# Patient Record
Sex: FEMALE | Race: WHITE | Employment: OTHER | URBAN - METROPOLITAN AREA
[De-identification: names, ages, dates, MRNs, and addresses within clinical notes are randomized per-mention and may not be internally consistent; named-entity substitution may affect disease eponyms.]

---

## 2017-01-18 ENCOUNTER — NURSING HOME VISIT (OUTPATIENT)
Dept: FAMILY MEDICINE | Facility: OTHER | Age: 82
End: 2017-01-18
Payer: COMMERCIAL

## 2017-01-18 DIAGNOSIS — I10 BENIGN ESSENTIAL HYPERTENSION: Primary | ICD-10-CM

## 2017-01-18 DIAGNOSIS — E03.9 ACQUIRED HYPOTHYROIDISM: ICD-10-CM

## 2017-01-18 PROCEDURE — 99308 SBSQ NF CARE LOW MDM 20: CPT | Performed by: FAMILY MEDICINE

## 2017-01-18 NOTE — MR AVS SNAPSHOT
"              After Visit Summary   2017    Estela Barber    MRN: [id number]           Patient Information     Date Of Birth          10/17/1925        Visit Information        Provider Department      2017 11:27 AM Pao Chau MD [facility]        Today's Diagnoses     Benign essential hypertension    -  1     Acquired hypothyroidism            Follow-ups after your visit        Follow-up notes from your care team     Return in about 4 weeks (around 2/15/2017).      Who to contact     If you have questions or need follow up information about today's clinic visit or your schedule please contact [facility] directly at [phone].  Normal or non-critical lab and imaging results will be communicated to you by MyChart, letter or phone within 4 business days after the clinic has received the results. If you do not hear from us within 7 days, please contact the clinic through MyChart or phone. If you have a critical or abnormal lab result, we will notify you by phone as soon as possible.  Submit refill requests through GreenIQ or call your pharmacy and they will forward the refill request to us. Please allow 3 business days for your refill to be completed.          Additional Information About Your Visit        MyChart Information     GreenIQ lets you send messages to your doctor, view your test results, renew your prescriptions, schedule appointments and more. To sign up, go to [email or web address] . Click on \"Log in\" on the left side of the screen, which will take you to the Welcome page. Then click on \"Sign up Now\" on the right side of the page.     You will be asked to enter the access code listed below, as well as some personal information. Please follow the directions to create your username and password.     Your access code is: [email or web address]  Expires: 2017 11:11 AM     Your access code will  in 90 days. If you need help or a new code, please call your " [facility] or [phone].        Care EveryWhere ID     This is your Care EveryWhere ID. This could be used by other organizations to access your [place] medical records  [id number]         Blood Pressure from Last 3 Encounters:   12/09/16 117/60   12/13/16 98/68   11/10/16 118/67    Weight from Last 3 Encounters:   12/09/16 122 lb 14.4 oz (55.747 kg)   11/10/16 121 lb (54.885 kg)   10/13/16 124 lb 9 oz (56.5 kg)              Today, you had the following     No orders found for display         Today's Medication Changes          These changes are accurate as of: 1/18/17 11:11 AM.  If you have any questions, ask your nurse or doctor.               These medicines have changed or have updated prescriptions.        Dose/Directions    polyethylene glycol Packet   Commonly known as:  MIRALAX/GLYCOLAX   This may have changed:    - when to take this  - additional instructions   Used for:  Fracture, intertrochanteric, left femur, closed, initial encounter (H)        Dose:  17 g   Take 17 g by mouth daily as needed for constipation Daily for the next 2 days, the daily prn if less than daily bowel movement   Quantity:  10 packet   Refills:  0                Primary Care Provider Office Phone # Fax #    R Nirmal Wade [phone]       [place] [address]        Thank you!     Thank you for choosing [facility]  for your care. Our goal is always to provide you with excellent care. Hearing back from our patients is one way we can continue to improve our services. Please take a few minutes to complete the written survey that you may receive in the mail after your visit with us. Thank you!             Your Updated Medication List - Protect others around you: Learn how to safely use, store and throw away your medicines at www.disposemymeds.org.          This list is accurate as of: 1/18/17 11:11 AM.  Always use your most recent med list.                    Brand Name Dispense Instructions for use    acetaminophen 325 MG tablet    TYLENOL    100 tablet    Take 2 tablets (650 mg) by mouth every 6 hours as needed for mild pain       alendronate 70 MG tablet    FOSAMAX    12 tablet    TAKE 1 TABLET BY MOUTH EVERY WEEK IN THE MORNING, AT LEAST 30 MINUTESBEFORE FIRST FOOD, BEVERAGE OR MEDICATION OF THE DAY       ARTIFICIAL TEARS OP      Apply to eye 4 times daily       ASPIRIN ADULT LOW STRENGTH 81 MG EC tablet   Generic drug:  aspirin     90 tablet    TAKE 1 TABLET BY MOUTH DAILY       bisacodyl 10 MG Suppository    DULCOLAX    30 suppository    Place 1 suppository (10 mg) rectally 2 times daily as needed for constipation       calcium-vitamin D-vitamin K 500-500-40 MG-UNT-MCG Chew    VIACTIV    60 tablet    CHEW AND SWALLOW 1 TABLET BY MOUTH 2 TIMES A DAY       CVS FISH OIL 1000 MG Caps     30 capsule    Take 1 capsule by mouth daily       ipratropium 0.03 % spray    ATROVENT    30 mL    SPRAYS 2 SPRAYS INTO BOTH NOSTRILS EVERY 12 HOURS       levothyroxine 75 MCG tablet    SYNTHROID/LEVOTHROID    30 tablet    TAKE 1 TABLET BY MOUTH DAILY       magnesium hydroxide 400 MG/5ML suspension    MILK OF MAGNESIA     Take by mouth every other day       metoprolol 100 MG 24 hr tablet    TOPROL-XL    30 tablet    TAKE ONE TABLET BY MOUTH EVERY DAY       * oxyCODONE HCl 5 MG Taba    OXECTA     Take 5 mg by mouth 2 times daily       * oxyCODONE 5 MG IR tablet    ROXICODONE    120 tablet    Take 0.5-1 tablets (2.5-5 mg) by mouth every 4 hours as needed for moderate to severe pain       polyethylene glycol Packet    MIRALAX/GLYCOLAX    10 packet    Take 17 g by mouth daily as needed for constipation Daily for the next 2 days, the daily prn if less than daily bowel movement       sennosides 8.6 MG tablet    SENOKOT     Take 1 tablet by mouth 2 times daily       UNABLE TO FIND      Colostomy wafer change 2 times weekly on Tuesday and Friday       ZETIA 10 MG tablet   Generic drug:   ezetimibe     30 tablet    TAKE 1 TABLET BY MOUTH DAILY       * Notice:  This list has 2 medication(s) that are the same as other medications prescribed for you. Read the directions carefully, and ask your doctor or other care provider to review them with you.

## 2017-01-18 NOTE — PROGRESS NOTES
HISTORY OF PRESENT ILLNESS:  Estela is a 89 year old female (10/17/1925)  resident of [facility] who is being seen today for a routine 30 day follow up. She was originally admitted to rehab unit following femur fracture, but has transitioned to extended care. Patient offers no other complaint.  Staff notes no other issues.    Current medications, allergies, and interdisciplinary care plan are reviewed.      Patient Active Problem List    Diagnosis Date Noted     Femur fracture, left (H) 10/13/2016     Priority: Medium     Facial skin lesion 10/28/2013     Priority: Medium     Advanced care planning/counseling discussion 07/07/2010     Priority: Medium     Advanced directives, counseling/discussion 05/01/2015     CHF (congestive heart failure) (H) 10/28/2013     Coronary atherosclerosis 01/01/2011     Problem list name updated by automated process. Provider to review       Kyphosis 01/01/2011     Osteoarthritis 01/01/2011     Problem list name updated by automated process. Provider to review       Benign essential hypertension 08/14/2006     Hypothyroidism 12/11/2000          Social History     Social History     Marital Status:      Spouse Name: N/A     Number of Children: N/A     Years of Education: N/A     Occupational History      Homemaker     retired     Social History Main Topics     Smoking status: Former Smoker     Smokeless tobacco: Never Used      Comment: tried to quit yes, no passive exposure     Alcohol Use: No     Drug Use: No     Sexual Activity: Not on file     Other Topics Concern     Caffeine Concern Yes     one cup daily     Parent/Sibling W/ Cabg, Mi Or Angioplasty Before 65f 55m? No     Social History Narrative    Lives at [facility]        Current Outpatient Prescriptions   Medication Sig     magnesium hydroxide (MILK OF MAGNESIA) 400 MG/5ML suspension Take by mouth every other day     oxyCODONE HCl (OXECTA) 5 MG TABA Take 5 mg by mouth 2 times daily     sennosides  (SENOKOT) 8.6 MG tablet Take 1 tablet by mouth 2 times daily     oxyCODONE (ROXICODONE) 5 MG immediate release tablet Take 0.5-1 tablets (2.5-5 mg) by mouth every 4 hours as needed for moderate to severe pain     acetaminophen (TYLENOL) 325 MG tablet Take 2 tablets (650 mg) by mouth every 6 hours as needed for mild pain     bisacodyl (DULCOLAX) 10 MG suppository Place 1 suppository (10 mg) rectally 2 times daily as needed for constipation     polyethylene glycol (MIRALAX/GLYCOLAX) packet Take 17 g by mouth daily as needed for constipation Daily for the next 2 days, the daily prn if less than daily bowel movement (Patient taking differently: Take 17 g by mouth daily Daily for the next 2 days, the daily prn if less than daily bowel movement)     ZETIA 10 MG tablet TAKE 1 TABLET BY MOUTH DAILY     ipratropium (ATROVENT) 0.03 % nasal spray SPRAYS 2 SPRAYS INTO BOTH NOSTRILS EVERY 12 HOURS     ASPIRIN ADULT LOW STRENGTH 81 MG EC tablet TAKE 1 TABLET BY MOUTH DAILY     calcium-vitamin D-vitamin K (VIACTIV) 500-500-40 MG-UNT-MCG CHEW CHEW AND SWALLOW 1 TABLET BY MOUTH 2 TIMES A DAY     levothyroxine (SYNTHROID, LEVOTHROID) 75 MCG tablet TAKE 1 TABLET BY MOUTH DAILY     alendronate (FOSAMAX) 70 MG tablet TAKE 1 TABLET BY MOUTH EVERY WEEK IN THE MORNING, AT LEAST 30 MINUTESBEFORE FIRST FOOD, BEVERAGE OR MEDICATION OF THE DAY     metoprolol (TOPROL-XL) 100 MG 24 hr tablet TAKE ONE TABLET BY MOUTH EVERY DAY     Omega-3 Fatty Acids (CVS FISH OIL) 1000 MG CAPS Take 1 capsule by mouth daily     Hypromellose (ARTIFICIAL TEARS OP) Apply to eye 4 times daily     UNABLE TO FIND Colostomy wafer change 2 times weekly on Tuesday and Friday     No current facility-administered medications for this visit.     Facility-Administered Medications Ordered in Other Visits   Medication     lidocaine 1% with EPINEPHrine 1:100,000 injection       Allergies   Allergen Reactions     Atorvastatin Calcium Other (See Comments)     Increased liver  function test  Lipitor         I have reviewed the care plan and do agree with the plan.      ROS:  No chest pain, shortness of breath, fever, chills, headache, nausea, vomiting, dysuria, or changes in bowel habits.  Appetite is good.  No pain noted.          OBJECTIVE:  There were no vitals taken for this visit.    GENERAL:  Chronically ill appearing, alert, and in no acute distress  RESP:  Lungs clear.  No rales, rhonchi, or wheezing  CV:  RRR.  S1 S2 with very soft murmur  SKIN:  Age-related changes.  No suspicious lesions or rashes.  PSYCH:  Mentation confused, affect normal/bright.  EXTREM:  No edema.          Lab/Diagnostic data:    TSH due in May, BMP due around the same time      ASSESSMENT/PLAN:  1. Benign essential hypertension  No changes in current plan, lab work due in the spring.    2. Acquired hypothyroidism  As above.        Above issues stable.  No changes in current medications or care plan.      Total time spent with patient visit was 25 min including patient visit, review of pertinent clinical information, and treatment plan.      Pao Chau MD

## 2017-01-24 ENCOUNTER — TELEPHONE (OUTPATIENT)
Dept: FAMILY MEDICINE | Facility: OTHER | Age: 82
End: 2017-01-24

## 2017-01-24 DIAGNOSIS — E78.00 PURE HYPERCHOLESTEROLEMIA: Primary | ICD-10-CM

## 2017-01-24 RX ORDER — EZETIMIBE 10 MG
10 TABLET ORAL DAILY
Qty: 30 TABLET | Refills: 8 | Status: SHIPPED | OUTPATIENT
Start: 2017-01-24 | End: 2020-01-01

## 2017-01-24 NOTE — TELEPHONE ENCOUNTER
Reason for call:  Medication    1. Medication Name? Ezetimibe  2. Is this request for a refill? Yes  3. What Pharmacy do you use? [facility] , [facility] but patient is in a home in [place]. Daughter isn't sure where  4. Have you contacted your pharmacy? No    5. If yes, when?  (Please note that the turn-around-time for prescriptions is 72 business hours; I am sending your request at this time. SEND TO  [facility]  )  Description: Patients insurance won't cover Ezetimib , her daughter said it will cover Zetia.Was an appointment offered for this a call? no  Preferred method for responding to this messageTelephone Call  If we cannot reach you directly, may we leave a detailed response at the number you provided? Yes  Can this message wait until your PCP/Provider returns if not available today? n/a

## 2017-02-17 ENCOUNTER — TRANSFERRED RECORDS (OUTPATIENT)
Dept: HEALTH INFORMATION MANAGEMENT | Facility: HOSPITAL | Age: 82
End: 2017-02-17

## 2017-02-17 ENCOUNTER — NURSING HOME VISIT (OUTPATIENT)
Dept: FAMILY MEDICINE | Facility: OTHER | Age: 82
End: 2017-02-17

## 2017-02-17 VITALS
HEART RATE: 65 BPM | DIASTOLIC BLOOD PRESSURE: 58 MMHG | WEIGHT: 120 LBS | TEMPERATURE: 97.7 F | BODY MASS INDEX: 24.24 KG/M2 | OXYGEN SATURATION: 92 % | RESPIRATION RATE: 20 BRPM | SYSTOLIC BLOOD PRESSURE: 111 MMHG

## 2017-02-17 DIAGNOSIS — Z53.9 ERRONEOUS ENCOUNTER--DISREGARD: Primary | ICD-10-CM

## 2017-02-17 NOTE — MR AVS SNAPSHOT
"              After Visit Summary   2017    Estela Barber    MRN: [id number]           Patient Information     Date Of Birth          10/17/1925        Visit Information        Provider Department      2017 2:25 PM Taylor Barber NP [facility]        Today's Diagnoses     ERRONEOUS ENCOUNTER--DISREGARD    -  1       Follow-ups after your visit        Who to contact     If you have questions or need follow up information about today's clinic visit or your schedule please contact [facility] directly at [phone].  Normal or non-critical lab and imaging results will be communicated to you by AchieveIt Onlinehart, letter or phone within 4 business days after the clinic has received the results. If you do not hear from us within 7 days, please contact the clinic through [employer]ve[employer] Online[employer] or phone. If you have a critical or abnormal lab result, we will notify you by phone as soon as possible.  Submit refill requests through Music[employer] or call your pharmacy and they will forward the refill request to us. Please allow 3 business days for your refill to be completed.          Additional Information About Your Visit        MyChart Information     Musicane lets you send messages to your doctor, view your test results, renew your prescriptions, schedule appointments and more. To sign up, go to [email or web address] . Click on \"Log in\" on the left side of the screen, which will take you to the Welcome page. Then click on \"Sign up Now\" on the right side of the page.     You will be asked to enter the access code listed below, as well as some personal information. Please follow the directions to create your username and password.     Your access code is: [email or web address]  Expires: 2017 11:11 AM     Your access code will  in 90 days. If you need help or a new code, please call your [facility] or [phone].        Care EveryWhere ID     This is your Care EveryWhere ID. This could be used " by other organizations to access your [place] medical records  [id number]         Blood Pressure from Last 3 Encounters:   01/31/17 111/58   12/09/16 117/60   12/13/16 98/68    Weight from Last 3 Encounters:   01/31/17 120 lb (54.4 kg)   12/09/16 122 lb 14.4 oz (55.7 kg)   11/10/16 121 lb (54.9 kg)              Today, you had the following     No orders found for display         Today's Medication Changes          These changes are accurate as of: 2/17/17 11:59 PM.  If you have any questions, ask your nurse or doctor.               These medicines have changed or have updated prescriptions.        Dose/Directions    * oxyCODONE HCl 5 MG Taba   Commonly known as:  OXECTA   This may have changed:  Another medication with the same name was changed. Make sure you understand how and when to take each.        Dose:  5 mg   Take 5 mg by mouth 2 times daily   Refills:  0       * oxyCODONE 5 MG IR tablet   Commonly known as:  ROXICODONE   This may have changed:  how much to take   Used for:  Fracture, intertrochanteric, left femur, closed, initial encounter (H)        Dose:  2.5-5 mg   Take 0.5-1 tablets (2.5-5 mg) by mouth every 4 hours as needed for moderate to severe pain   Quantity:  120 tablet   Refills:  0       polyethylene glycol Packet   Commonly known as:  MIRALAX/GLYCOLAX   This may have changed:    - when to take this  - additional instructions   Used for:  Fracture, intertrochanteric, left femur, closed, initial encounter (H)        Dose:  17 g   Take 17 g by mouth daily as needed for constipation Daily for the next 2 days, the daily prn if less than daily bowel movement   Quantity:  10 packet   Refills:  0       * Notice:  This list has 2 medication(s) that are the same as other medications prescribed for you. Read the directions carefully, and ask your doctor or other care provider to review them with you.             Primary Care Provider Office Phone # Fax #    R Nirmal Wade [phone]  [phone]       [facility] [place] [address]  [place] [address]        Thank you!     Thank you for choosing [facility]  for your care. Our goal is always to provide you with excellent care. Hearing back from our patients is one way we can continue to improve our services. Please take a few minutes to complete the written survey that you may receive in the mail after your visit with us. Thank you!             Your Updated Medication List - Protect others around you: Learn how to safely use, store and throw away your medicines at www.disposemymeds.org.          This list is accurate as of: 2/17/17 11:59 PM.  Always use your most recent med list.                   Brand Name Dispense Instructions for use    acetaminophen 325 MG tablet    TYLENOL    100 tablet    Take 2 tablets (650 mg) by mouth every 6 hours as needed for mild pain       alendronate 70 MG tablet    FOSAMAX    12 tablet    TAKE 1 TABLET BY MOUTH EVERY WEEK IN THE MORNING, AT LEAST 30 MINUTESBEFORE FIRST FOOD, BEVERAGE OR MEDICATION OF THE DAY       ARTIFICIAL TEARS OP      Apply to eye 4 times daily       ASPIRIN ADULT LOW STRENGTH 81 MG EC tablet   Generic drug:  aspirin     90 tablet    TAKE 1 TABLET BY MOUTH DAILY       bisacodyl 10 MG Suppository    DULCOLAX    30 suppository    Place 1 suppository (10 mg) rectally 2 times daily as needed for constipation       calcium-vitamin D-vitamin K 500-500-40 MG-UNT-MCG Chew    VIACTIV    60 tablet    CHEW AND SWALLOW 1 TABLET BY MOUTH 2 TIMES A DAY       CVS FISH OIL 1000 MG Caps     30 capsule    Take 1 capsule by mouth daily       ipratropium 0.03 % spray    ATROVENT    30 mL    SPRAYS 2 SPRAYS INTO BOTH NOSTRILS EVERY 12 HOURS       levothyroxine 75 MCG tablet    SYNTHROID/LEVOTHROID    30 tablet    TAKE 1 TABLET BY MOUTH DAILY       magnesium hydroxide 400 MG/5ML suspension    MILK OF MAGNESIA     Take by mouth every other day       metoprolol 100 MG 24 hr tablet     TOPROL-XL    30 tablet    TAKE ONE TABLET BY MOUTH EVERY DAY       * oxyCODONE HCl 5 MG Taba    OXECTA     Take 5 mg by mouth 2 times daily       * oxyCODONE 5 MG IR tablet    ROXICODONE    120 tablet    Take 0.5-1 tablets (2.5-5 mg) by mouth every 4 hours as needed for moderate to severe pain       polyethylene glycol Packet    MIRALAX/GLYCOLAX    10 packet    Take 17 g by mouth daily as needed for constipation Daily for the next 2 days, the daily prn if less than daily bowel movement       sennosides 8.6 MG tablet    SENOKOT     Take 1 tablet by mouth 2 times daily       UNABLE TO FIND      Colostomy wafer change 2 times weekly on Tuesday and Friday       ZETIA 10 MG tablet   Generic drug:  ezetimibe     30 tablet    Take 1 tablet (10 mg) by mouth daily       * Notice:  This list has 2 medication(s) that are the same as other medications prescribed for you. Read the directions carefully, and ask your doctor or other care provider to review them with you.

## 2017-02-21 ENCOUNTER — TRANSFERRED RECORDS (OUTPATIENT)
Dept: HEALTH INFORMATION MANAGEMENT | Facility: HOSPITAL | Age: 82
End: 2017-02-21

## 2017-02-21 ENCOUNTER — NURSING HOME VISIT (OUTPATIENT)
Dept: FAMILY MEDICINE | Facility: OTHER | Age: 82
End: 2017-02-21
Payer: COMMERCIAL

## 2017-02-21 DIAGNOSIS — E03.8 OTHER SPECIFIED HYPOTHYROIDISM: Primary | ICD-10-CM

## 2017-02-21 DIAGNOSIS — I50.22 CHRONIC SYSTOLIC CONGESTIVE HEART FAILURE (H): ICD-10-CM

## 2017-02-21 DIAGNOSIS — I10 BENIGN ESSENTIAL HYPERTENSION: ICD-10-CM

## 2017-02-21 DIAGNOSIS — I25.10 ATHEROSCLEROSIS OF NATIVE CORONARY ARTERY OF NATIVE HEART WITHOUT ANGINA PECTORIS: ICD-10-CM

## 2017-02-21 PROCEDURE — 99308 SBSQ NF CARE LOW MDM 20: CPT | Performed by: NURSE PRACTITIONER

## 2017-02-21 NOTE — MR AVS SNAPSHOT
"              After Visit Summary   2/21/2017    Estela Barber    MRN: [id number]           Patient Information     Date Of Birth          10/17/1925        Visit Information        Provider Department      2/21/2017 4:23 PM Taylor Barber NP [facility]        Today's Diagnoses     Other specified hypothyroidism    -  1    Chronic systolic congestive heart failure (H)        Benign essential hypertension        Atherosclerosis of native coronary artery of native heart without angina pectoris           Follow-ups after your visit        Follow-up notes from your care team     Return in about 30 days (around 3/23/2017).      Who to contact     If you have questions or need follow up information about today's clinic visit or your schedule please contact [facility] directly at [phone].  Normal or non-critical lab and imaging results will be communicated to you by MyChart, letter or phone within 4 business days after the clinic has received the results. If you do not hear from us within 7 days, please contact the clinic through MyChart or phone. If you have a critical or abnormal lab result, we will notify you by phone as soon as possible.  Submit refill requests through [employer] or call your pharmacy and they will forward the refill request to us. Please allow 3 business days for your refill to be completed.          Additional Information About Your Visit        MyChart Information     [employer] lets you send messages to your doctor, view your test results, renew your prescriptions, schedule appointments and more. To sign up, go to [email or web address] . Click on \"Log in\" on the left side of the screen, which will take you to the Welcome page. Then click on \"Sign up Now\" on the right side of the page.     You will be asked to enter the access code listed below, as well as some personal information. Please follow the directions to create your username and password.     Your " access code is: [email or web address]  Expires: 2017 11:11 AM     Your access code will  in 90 days. If you need help or a new code, please call your [place] clinic or [phone].        Care EveryWhere ID     This is your Care EveryWhere ID. This could be used by other organizations to access your [place] medical records  [id number]        Your Vitals Were     Pulse Temperature Respirations Pulse Oximetry BMI (Body Mass Index)       65 97.7  F (36.5  C) 20 92% 24.24 kg/m2        Blood Pressure from Last 3 Encounters:   17 111/58   16 117/60   16 98/68    Weight from Last 3 Encounters:   17 120 lb (54.4 kg)   16 122 lb 14.4 oz (55.7 kg)   11/10/16 121 lb (54.9 kg)              Today, you had the following     No orders found for display         Today's Medication Changes          These changes are accurate as of: 17 11:59 PM.  If you have any questions, ask your nurse or doctor.               These medicines have changed or have updated prescriptions.        Dose/Directions    oxyCODONE 5 MG IR tablet   Commonly known as:  ROXICODONE   This may have changed:    - how much to take  - Another medication with the same name was removed. Continue taking this medication, and follow the directions you see here.   Used for:  Fracture, intertrochanteric, left femur, closed, initial encounter (H)        Dose:  2.5-5 mg   Take 0.5-1 tablets (2.5-5 mg) by mouth every 4 hours as needed for moderate to severe pain   Quantity:  120 tablet   Refills:  0       polyethylene glycol Packet   Commonly known as:  MIRALAX/GLYCOLAX   This may have changed:    - when to take this  - additional instructions   Used for:  Fracture, intertrochanteric, left femur, closed, initial encounter (H)        Dose:  17 g   Take 17 g by mouth daily as needed for constipation Daily for the next 2 days, the daily prn if less than daily bowel movement   Quantity:  10 packet   Refills:  0         Stop taking these  medicines if you haven't already. Please contact your care team if you have questions.     alendronate 70 MG tablet   Commonly known as:  FOSAMAX           UNABLE TO FIND                    Primary Care Provider Office Phone # Fax #    R Nirmal Wade [phone]       [facility] [place] [address] [place] [id number]        Thank you!     Thank you for choosing [facility]  for your care. Our goal is always to provide you with excellent care. Hearing back from our patients is one way we can continue to improve our services. Please take a few minutes to complete the written survey that you may receive in the mail after your visit with us. Thank you!             Your Updated Medication List - Protect others around you: Learn how to safely use, store and throw away your medicines at www.disposemymeds.org.          This list is accurate as of: 2/21/17 11:59 PM.  Always use your most recent med list.                   Brand Name Dispense Instructions for use    acetaminophen 325 MG tablet    TYLENOL    100 tablet    Take 2 tablets (650 mg) by mouth every 6 hours as needed for mild pain       ARTIFICIAL TEARS OP      Apply to eye 4 times daily       ASPIRIN ADULT LOW STRENGTH 81 MG EC tablet   Generic drug:  aspirin     90 tablet    TAKE 1 TABLET BY MOUTH DAILY       bisacodyl 10 MG Suppository    DULCOLAX    30 suppository    Place 1 suppository (10 mg) rectally 2 times daily as needed for constipation       calcium-vitamin D-vitamin K 500-500-40 MG-UNT-MCG Chew    VIACTIV    60 tablet    CHEW AND SWALLOW 1 TABLET BY MOUTH 2 TIMES A DAY       CVS FISH OIL 1000 MG Caps     30 capsule    Take 1 capsule by mouth daily       ipratropium 0.03 % spray    ATROVENT    30 mL    SPRAYS 2 SPRAYS INTO BOTH NOSTRILS EVERY 12 HOURS       levothyroxine 75 MCG tablet    SYNTHROID/LEVOTHROID    30 tablet    TAKE 1 TABLET BY MOUTH DAILY       magnesium hydroxide 400 MG/5ML suspension    MILK OF  MAGNESIA     Take by mouth every other day       metoprolol 100 MG 24 hr tablet    TOPROL-XL    30 tablet    TAKE ONE TABLET BY MOUTH EVERY DAY       oxyCODONE 5 MG IR tablet    ROXICODONE    120 tablet    Take 0.5-1 tablets (2.5-5 mg) by mouth every 4 hours as needed for moderate to severe pain       polyethylene glycol Packet    MIRALAX/GLYCOLAX    10 packet    Take 17 g by mouth daily as needed for constipation Daily for the next 2 days, the daily prn if less than daily bowel movement       sennosides 8.6 MG tablet    SENOKOT     Take 1 tablet by mouth 2 times daily       UNABLE TO FIND      Colostomy wafer change 2 times weekly on Tuesday and Friday       ZETIA 10 MG tablet   Generic drug:  ezetimibe     30 tablet    Take 1 tablet (10 mg) by mouth daily

## 2017-02-25 ENCOUNTER — TRANSFERRED RECORDS (OUTPATIENT)
Dept: HEALTH INFORMATION MANAGEMENT | Facility: HOSPITAL | Age: 82
End: 2017-02-25

## 2017-02-26 NOTE — PROGRESS NOTES
HISTORY OF PRESENT ILLNESS:  Estela is a 89 year old female (10/17/1925)  resident of [facility] who is being seen today for a routine 30 day follow up.  Patient offers no other complaint.  Staff notes no other issues.    Current medications, allergies, and interdisciplinary care plan are reviewed.      Patient Active Problem List    Diagnosis Date Noted     Femur fracture, left (H) 10/13/2016     Priority: Medium     Advanced directives, counseling/discussion 05/01/2015     Priority: Medium     Facial skin lesion 10/28/2013     Priority: Medium     CHF (congestive heart failure) (H) 10/28/2013     Priority: Medium     Coronary atherosclerosis 01/01/2011     Priority: Medium     Problem list name updated by automated process. Provider to review       Kyphosis 01/01/2011     Priority: Medium     Osteoarthritis 01/01/2011     Priority: Medium     Problem list name updated by automated process. Provider to review       Advanced care planning/counseling discussion 07/07/2010     Priority: Medium     Benign essential hypertension 08/14/2006     Priority: Medium     Hypothyroidism 12/11/2000     Priority: Medium          Social History     Social History     Marital status:      Spouse name: N/A     Number of children: N/A     Years of education: N/A     Occupational History      Homemaker     retired     Social History Main Topics     Smoking status: Former Smoker     Smokeless tobacco: Never Used      Comment: tried to quit yes, no passive exposure     Alcohol use No     Drug use: No     Sexual activity: Not on file     Other Topics Concern     Caffeine Concern Yes     one cup daily     Parent/Sibling W/ Cabg, Mi Or Angioplasty Before 65f 55m? No     Social History Narrative    Lives at [facility]        Current Outpatient Prescriptions   Medication Sig     ZETIA 10 MG tablet Take 1 tablet (10 mg) by mouth daily     magnesium hydroxide (MILK OF MAGNESIA) 400 MG/5ML suspension Take by mouth every other  day     sennosides (SENOKOT) 8.6 MG tablet Take 1 tablet by mouth 2 times daily     oxyCODONE (ROXICODONE) 5 MG immediate release tablet Take 0.5-1 tablets (2.5-5 mg) by mouth every 4 hours as needed for moderate to severe pain (Patient taking differently: Take 5 mg by mouth every 4 hours as needed for moderate to severe pain )     acetaminophen (TYLENOL) 325 MG tablet Take 2 tablets (650 mg) by mouth every 6 hours as needed for mild pain     bisacodyl (DULCOLAX) 10 MG suppository Place 1 suppository (10 mg) rectally 2 times daily as needed for constipation     polyethylene glycol (MIRALAX/GLYCOLAX) packet Take 17 g by mouth daily as needed for constipation Daily for the next 2 days, the daily prn if less than daily bowel movement (Patient taking differently: Take 17 g by mouth daily Daily for the next 2 days, the daily prn if less than daily bowel movement)     ipratropium (ATROVENT) 0.03 % nasal spray SPRAYS 2 SPRAYS INTO BOTH NOSTRILS EVERY 12 HOURS     ASPIRIN ADULT LOW STRENGTH 81 MG EC tablet TAKE 1 TABLET BY MOUTH DAILY     calcium-vitamin D-vitamin K (VIACTIV) 500-500-40 MG-UNT-MCG CHEW CHEW AND SWALLOW 1 TABLET BY MOUTH 2 TIMES A DAY     levothyroxine (SYNTHROID, LEVOTHROID) 75 MCG tablet TAKE 1 TABLET BY MOUTH DAILY     metoprolol (TOPROL-XL) 100 MG 24 hr tablet TAKE ONE TABLET BY MOUTH EVERY DAY     Omega-3 Fatty Acids (CVS FISH OIL) 1000 MG CAPS Take 1 capsule by mouth daily     Hypromellose (ARTIFICIAL TEARS OP) Apply to eye 4 times daily     UNABLE TO FIND Colostomy wafer change 2 times weekly on Tuesday and Friday     No current facility-administered medications for this visit.      Facility-Administered Medications Ordered in Other Visits   Medication     lidocaine 1% with EPINEPHrine 1:100,000 injection       Allergies   Allergen Reactions     Atorvastatin Calcium Other (See Comments)     Increased liver function test  Lipitor         I have reviewed the care plan and do agree with the  plan.      ROS:  No chest pain, shortness of breath, fever, chills, headache, nausea, vomiting, dysuria, or changes in bowel habits.  Appetite is stable.  no pain noted.          OBJECTIVE:  /58  Pulse 65  Temp 97.7  F (36.5  C)  Resp 20  Wt 120 lb (54.4 kg)  SpO2 92%  BMI 24.24 kg/m2    GENERAL:  Chronically ill appearing, alert, and in no acute distress  RESP:  Lungs clear.  No rales, rhonchi, or wheezing  CV:  RRR.  S1 S2 very soft murmur.   SKIN:  Age-related changes.  No suspicious lesions or rashes.  PSYCH:  Mentation confused, affect normal  EXTREM:  No edema.            Lab/Diagnostic data:    TSH due in May      ASSESSMENT/PLAN:  1. Other specified hypothyroidism  No changes to current plan; labs in May    2. Chronic systolic congestive heart failure (H)  As above    3. Benign essential hypertension  As above    4. Atherosclerosis of native coronary artery of native heart without angina pectoris  As above        Above issues stable.  No changes in current medications or care plan.      Total time spent with patient visit was 25 min including patient visit, review of pertinent clinical information, and treatment plan.      Taylor Barber NP

## 2017-03-13 VITALS
OXYGEN SATURATION: 92 % | TEMPERATURE: 97.7 F | DIASTOLIC BLOOD PRESSURE: 64 MMHG | SYSTOLIC BLOOD PRESSURE: 109 MMHG | WEIGHT: 119 LBS | RESPIRATION RATE: 20 BRPM | BODY MASS INDEX: 24.04 KG/M2 | HEART RATE: 65 BPM

## 2017-03-15 ENCOUNTER — NURSING HOME VISIT (OUTPATIENT)
Dept: FAMILY MEDICINE | Facility: OTHER | Age: 82
End: 2017-03-15
Payer: COMMERCIAL

## 2017-03-15 DIAGNOSIS — E03.9 HYPOTHYROIDISM, UNSPECIFIED TYPE: ICD-10-CM

## 2017-03-15 DIAGNOSIS — I10 BENIGN ESSENTIAL HYPERTENSION: ICD-10-CM

## 2017-03-15 DIAGNOSIS — I50.22 CHRONIC SYSTOLIC CONGESTIVE HEART FAILURE (H): Primary | ICD-10-CM

## 2017-03-15 PROCEDURE — 99308 SBSQ NF CARE LOW MDM 20: CPT | Performed by: FAMILY MEDICINE

## 2017-03-15 NOTE — MR AVS SNAPSHOT
"              After Visit Summary   3/15/2017    Estela Barber    MRN: [id number]           Patient Information     Date Of Birth          10/17/1925        Visit Information        Provider Department      3/15/2017 11:59 AM Pao Chau MD [facility]        Today's Diagnoses     Chronic systolic congestive heart failure (H)    -  1    Benign essential hypertension        Hypothyroidism, unspecified type           Follow-ups after your visit        Follow-up notes from your care team     Return in about 4 weeks (around 2017).      Who to contact     If you have questions or need follow up information about today's clinic visit or your schedule please contact [facility] directly at [phone].  Normal or non-critical lab and imaging results will be communicated to you by MyChart, letter or phone within 4 business days after the clinic has received the results. If you do not hear from us within 7 days, please contact the clinic through MyChart or phone. If you have a critical or abnormal lab result, we will notify you by phone as soon as possible.  Submit refill requests through [employer] or call your pharmacy and they will forward the refill request to us. Please allow 3 business days for your refill to be completed.          Additional Information About Your Visit        MyChart Information     [employer] lets you send messages to your doctor, view your test results, renew your prescriptions, schedule appointments and more. To sign up, go to [email or web address] . Click on \"Log in\" on the left side of the screen, which will take you to the Welcome page. Then click on \"Sign up Now\" on the right side of the page.     You will be asked to enter the access code listed below, as well as some personal information. Please follow the directions to create your username and password.     Your access code is: [email or web address]  Expires: 2017 12:11 PM     Your access code will  " in 90 days. If you need help or a new code, please call your [facility] or [phone].        Care EveryWhere ID     This is your Care EveryWhere ID. This could be used by other organizations to access your [place] medical records  [id number]        Your Vitals Were     Pulse Temperature Respirations Pulse Oximetry BMI (Body Mass Index)       65 97.7  F (36.5  C) 20 92% 24.04 kg/m2        Blood Pressure from Last 3 Encounters:   03/13/17 109/64   01/31/17 111/58   12/09/16 117/60    Weight from Last 3 Encounters:   03/13/17 119 lb (54 kg)   01/31/17 120 lb (54.4 kg)   12/09/16 122 lb 14.4 oz (55.7 kg)              Today, you had the following     No orders found for display         Today's Medication Changes          These changes are accurate as of: 3/15/17 11:59 PM.  If you have any questions, ask your nurse or doctor.               These medicines have changed or have updated prescriptions.        Dose/Directions    oxyCODONE 5 MG IR tablet   Commonly known as:  ROXICODONE   This may have changed:  how much to take   Used for:  Fracture, intertrochanteric, left femur, closed, initial encounter (H)        Dose:  2.5-5 mg   Take 0.5-1 tablets (2.5-5 mg) by mouth every 4 hours as needed for moderate to severe pain   Quantity:  120 tablet   Refills:  0       polyethylene glycol Packet   Commonly known as:  MIRALAX/GLYCOLAX   This may have changed:    - when to take this  - additional instructions   Used for:  Fracture, intertrochanteric, left femur, closed, initial encounter (H)        Dose:  17 g   Take 17 g by mouth daily as needed for constipation Daily for the next 2 days, the daily prn if less than daily bowel movement   Quantity:  10 packet   Refills:  0                Primary Care Provider Office Phone # Fax #    R Nirmal Wade [phone]       [address] [address]        Thank you!     Thank you for [place] [facility]  [place]. Our goal is always to provide you with excellent care. Hearing back from our patients is one way we can continue to improve our services. Please take a few minutes to complete the written survey that you may receive in the mail after your visit with us. Thank you!             Your Updated Medication List - Protect others around you: Learn how to safely use, store and throw away your medicines at www.disposemymeds.org.          This list is accurate as of: 3/15/17 11:59 PM.  Always use your most recent med list.                   Brand Name Dispense Instructions for use    acetaminophen 325 MG tablet    TYLENOL    100 tablet    Take 2 tablets (650 mg) by mouth every 6 hours as needed for mild pain       ARTIFICIAL TEARS OP      Apply to eye 4 times daily       ASPIRIN ADULT LOW STRENGTH 81 MG EC tablet   Generic drug:  aspirin     90 tablet    TAKE 1 TABLET BY MOUTH DAILY       bisacodyl 10 MG Suppository    DULCOLAX    30 suppository    Place 1 suppository (10 mg) rectally 2 times daily as needed for constipation       ipratropium 0.03 % spray    ATROVENT    30 mL    SPRAYS 2 SPRAYS INTO BOTH NOSTRILS EVERY 12 HOURS       levothyroxine 75 MCG tablet    SYNTHROID/LEVOTHROID    30 tablet    TAKE 1 TABLET BY MOUTH DAILY       magnesium hydroxide 400 MG/5ML suspension    MILK OF MAGNESIA     Take by mouth every other day       metoprolol 100 MG 24 hr tablet    TOPROL-XL    30 tablet    TAKE ONE TABLET BY MOUTH EVERY DAY       oxyCODONE 5 MG IR tablet    ROXICODONE    120 tablet    Take 0.5-1 tablets (2.5-5 mg) by mouth every 4 hours as needed for moderate to severe pain       polyethylene glycol Packet    MIRALAX/GLYCOLAX    10 packet    Take 17 g by mouth daily as needed for constipation Daily for the next 2 days, the daily prn if less than daily bowel movement       sennosides 8.6 MG tablet    SENOKOT     Take 1 tablet by mouth 2 times daily       UNABLE TO FIND      Colostomy wafer change 2 times weekly on  Tuesday and Friday       ZETIA 10 MG tablet   Generic drug:  ezetimibe     30 tablet    Take 1 tablet (10 mg) by mouth daily

## 2017-03-16 NOTE — PROGRESS NOTES
HISTORY OF PRESENT ILLNESS:  Estela is a 89 year old female (10/17/1925)  resident of [place] who is being seen today for a routine 30 day follow up. She has overall been feeling well, she is currently eating breakfast. Patient offers no other complaint.  Staff notes no other issues.    Current medications, allergies, and interdisciplinary care plan are reviewed.      Patient Active Problem List    Diagnosis Date Noted     Femur fracture, left (H) 10/13/2016     Priority: Medium     Facial skin lesion 10/28/2013     Priority: Medium     Advanced care planning/counseling discussion 07/07/2010     Priority: Medium     Advanced directives, counseling/discussion 05/01/2015     CHF (congestive heart failure) (H) 10/28/2013     Coronary atherosclerosis 01/01/2011     Problem list name updated by automated process. Provider to review       Kyphosis 01/01/2011     Osteoarthritis 01/01/2011     Problem list name updated by automated process. Provider to review       Benign essential hypertension 08/14/2006     Hypothyroidism 12/11/2000          Social History     Social History     Marital status:      Spouse name: N/A     Number of children: N/A     Years of education: N/A     Occupational History      Homemaker     retired     Social History Main Topics     Smoking status: Former Smoker     Smokeless tobacco: Never Used      Comment: tried to quit yes, no passive exposure     Alcohol use No     Drug use: No     Sexual activity: Not on file     Other Topics Concern     Caffeine Concern Yes     one cup daily     Parent/Sibling W/ Cabg, Mi Or Angioplasty Before 65f 55m? No     Social History Narrative    L[facility] at [facility]        Current Outpatient Prescriptions   Medication Sig     ZETIA 10 MG tablet Take 1 tablet (10 mg) by mouth daily     magnesium hydroxide (MILK OF MAGNESIA) 400 MG/5ML suspension Take by mouth every other day     sennosides (SENOKOT) 8.6 MG tablet Take 1 tablet by mouth 2 times  daily     oxyCODONE (ROXICODONE) 5 MG immediate release tablet Take 0.5-1 tablets (2.5-5 mg) by mouth every 4 hours as needed for moderate to severe pain (Patient taking differently: Take 5 mg by mouth every 4 hours as needed for moderate to severe pain )     acetaminophen (TYLENOL) 325 MG tablet Take 2 tablets (650 mg) by mouth every 6 hours as needed for mild pain     bisacodyl (DULCOLAX) 10 MG suppository Place 1 suppository (10 mg) rectally 2 times daily as needed for constipation     polyethylene glycol (MIRALAX/GLYCOLAX) packet Take 17 g by mouth daily as needed for constipation Daily for the next 2 days, the daily prn if less than daily bowel movement (Patient taking differently: Take 17 g by mouth daily Daily for the next 2 days, the daily prn if less than daily bowel movement)     ipratropium (ATROVENT) 0.03 % nasal spray SPRAYS 2 SPRAYS INTO BOTH NOSTRILS EVERY 12 HOURS     ASPIRIN ADULT LOW STRENGTH 81 MG EC tablet TAKE 1 TABLET BY MOUTH DAILY     levothyroxine (SYNTHROID, LEVOTHROID) 75 MCG tablet TAKE 1 TABLET BY MOUTH DAILY     metoprolol (TOPROL-XL) 100 MG 24 hr tablet TAKE ONE TABLET BY MOUTH EVERY DAY     Hypromellose (ARTIFICIAL TEARS OP) Apply to eye 4 times daily     UNABLE TO FIND Colostomy wafer change 2 times weekly on Tuesday and Friday     No current facility-administered medications for this visit.      Facility-Administered Medications Ordered in Other Visits   Medication     lidocaine 1% with EPINEPHrine 1:100,000 injection       Allergies   Allergen Reactions     Atorvastatin Calcium Other (See Comments)     Increased liver function test  Lipitor         I have reviewed the care plan and do agree with the plan.      ROS:  No chest pain, shortness of breath, fever, chills, headache, nausea, vomiting, dysuria, or changes in bowel habits.  Appetite is good.  No pain noted.          OBJECTIVE:  /64  Pulse 65  Temp 97.7  F (36.5  C)  Resp 20  Wt 119 lb (54 kg)  SpO2 92%  BMI 24.04  kg/m2    GENERAL:  Chronically ill appearing, alert, and in no acute distress  RESP:  Lungs clear.  No rales, rhonchi, or wheezing  CV:  RRR.  S1 S2 with 1-2/6 systolic murmur. No clicks or rubs.  SKIN:  Age-related changes.  No suspicious lesions or rashes.  PSYCH:  Mentation normal, affect normal.  EXTREM:  No edema.          Lab/Diagnostic data:    TSH last checked 5/2016; CBC, Creatinine and BUN were checked 10/2016      ASSESSMENT/PLAN:  1. Chronic systolic congestive heart failure (H)  No changes to current plan.  Labs due in May    2. Benign essential hypertension  As above.    3. Hypothyroidism, unspecified type  As above.        Above issues stable.  No changes in current medications or care plan.      Total time spent with patient visit was 25 min including patient visit, review of pertinent clinical information, and treatment plan.      Pao Chau MD

## 2017-04-11 ENCOUNTER — MEDICAL CORRESPONDENCE (OUTPATIENT)
Dept: HEALTH INFORMATION MANAGEMENT | Facility: HOSPITAL | Age: 82
End: 2017-04-11

## 2017-04-17 ENCOUNTER — MEDICAL CORRESPONDENCE (OUTPATIENT)
Dept: HEALTH INFORMATION MANAGEMENT | Facility: HOSPITAL | Age: 82
End: 2017-04-17

## 2017-04-17 VITALS
RESPIRATION RATE: 16 BRPM | DIASTOLIC BLOOD PRESSURE: 58 MMHG | TEMPERATURE: 98.8 F | HEART RATE: 91 BPM | OXYGEN SATURATION: 91 % | WEIGHT: 122 LBS | SYSTOLIC BLOOD PRESSURE: 100 MMHG | BODY MASS INDEX: 24.64 KG/M2

## 2017-04-18 ENCOUNTER — NURSING HOME VISIT (OUTPATIENT)
Dept: FAMILY MEDICINE | Facility: OTHER | Age: 82
End: 2017-04-18
Payer: COMMERCIAL

## 2017-04-18 ENCOUNTER — MEDICAL CORRESPONDENCE (OUTPATIENT)
Dept: HEALTH INFORMATION MANAGEMENT | Facility: HOSPITAL | Age: 82
End: 2017-04-18

## 2017-04-18 DIAGNOSIS — E03.9 ACQUIRED HYPOTHYROIDISM: Primary | ICD-10-CM

## 2017-04-18 PROCEDURE — 99308 SBSQ NF CARE LOW MDM 20: CPT | Performed by: NURSE PRACTITIONER

## 2017-04-18 NOTE — MR AVS SNAPSHOT
"              After Visit Summary   2017    Estela Barber    MRN: [id number]           Patient Information     Date Of Birth          10/17/1925        Visit Information        Provider Department      2017 4:12 PM Taylor Barber NP [facility]        Today's Diagnoses     Acquired hypothyroidism    -  1       Follow-ups after your visit        Who to contact     If you have questions or need follow up information about today's clinic visit or your schedule please contact [facility] directly at [phone].  Normal or non-critical lab and imaging results will be communicated to you by [employer], letter or phone within 4 business days after the clinic has received the results. If you do not hear from us within 7 days, please contact the clinic through [employer] or phone. If you have a critical or abnormal lab result, we will notify you by phone as soon as possible.  Submit refill requests through [employer] or call your pharmacy and they will forward the refill request to us. Please allow 3 business days for your refill to be completed.          Additional Information About Your Visit        MyChart Information     [employer] lets you send messages to your doctor, view your test results, renew your prescriptions, schedule appointments and more. To sign up, go to [email or web address] . Click on \"Log in\" on the left side of the screen, which will take you to the Welcome page. Then click on \"Sign up Now\" on the right side of the page.     You will be asked to enter the access code listed below, as well as some personal information. Please follow the directions to create your username and password.     Your access code is: [email or web address]  Expires: 2017 12:52 PM     Your access code will  in 90 days. If you need help or a new code, please call your [facility] or [phone].        Care EveryWhere ID     This is your Care EveryWhere ID. This could be used by other " organizations to access your [place] medical records  [id number]        Your Vitals Were     Pulse Temperature Respirations Pulse Oximetry BMI (Body Mass Index)       91 98.8  F (37.1  C) (Tympanic) 16 91% 24.64 kg/m2        Blood Pressure from Last 3 Encounters:   04/11/17 100/58   03/13/17 109/64   01/31/17 111/58    Weight from Last 3 Encounters:   04/11/17 122 lb (55.3 kg)   03/13/17 119 lb (54 kg)   01/31/17 120 lb (54.4 kg)              Today, you had the following     No orders found for display         Today's Medication Changes          These changes are accurate as of: 4/18/17 11:59 PM.  If you have any questions, ask your nurse or doctor.               These medicines have changed or have updated prescriptions.        Dose/Directions    oxyCODONE 5 MG IR tablet   Commonly known as:  ROXICODONE   This may have changed:  how much to take   Used for:  Fracture, intertrochanteric, left femur, closed, initial encounter (H)        Dose:  2.5-5 mg   Take 0.5-1 tablets (2.5-5 mg) by mouth every 4 hours as needed for moderate to severe pain   Quantity:  120 tablet   Refills:  0       polyethylene glycol Packet   Commonly known as:  MIRALAX/GLYCOLAX   This may have changed:    - when to take this  - additional instructions   Used for:  Fracture, intertrochanteric, left femur, closed, initial encounter (H)        Dose:  17 g   Take 17 g by mouth daily as needed for constipation Daily for the next 2 days, the daily prn if less than daily bowel movement   Quantity:  10 packet   Refills:  0         Stop taking these medicines if you haven't already. Please contact your care team if you have questions.     magnesium hydroxide 400 MG/5ML suspension   Commonly known as:  MILK OF MAGNESIA                    Primary Care Provider Office Phone # Fax #    R Nirmal Wade [phone]       [address] [address]        Thank you!     Thank you for choosing [place]  [facility]  for your care. Our goal is always to provide you with excellent care. Hearing back from our patients is one way we can continue to improve our services. Please take a few minutes to complete the written survey that you may receive in the mail after your visit with us. Thank you!             Your Updated Medication List - Protect others around you: Learn how to safely use, store and throw away your medicines at www.disposemymeds.org.          This list is accurate as of: 4/18/17 11:59 PM.  Always use your most recent med list.                   Brand Name Dispense Instructions for use    acetaminophen 325 MG tablet    TYLENOL    100 tablet    Take 2 tablets (650 mg) by mouth every 6 hours as needed for mild pain       ARTIFICIAL TEARS OP      Apply to eye 4 times daily       ASPIRIN ADULT LOW STRENGTH 81 MG EC tablet   Generic drug:  aspirin     90 tablet    TAKE 1 TABLET BY MOUTH DAILY       bisacodyl 10 MG Suppository    DULCOLAX    30 suppository    Place 1 suppository (10 mg) rectally 2 times daily as needed for constipation       ipratropium 0.03 % spray    ATROVENT    30 mL    SPRAYS 2 SPRAYS INTO BOTH NOSTRILS EVERY 12 HOURS       levothyroxine 75 MCG tablet    SYNTHROID/LEVOTHROID    30 tablet    TAKE 1 TABLET BY MOUTH DAILY       metoprolol 100 MG 24 hr tablet    TOPROL-XL    30 tablet    TAKE ONE TABLET BY MOUTH EVERY DAY       oxyCODONE 5 MG IR tablet    ROXICODONE    120 tablet    Take 0.5-1 tablets (2.5-5 mg) by mouth every 4 hours as needed for moderate to severe pain       polyethylene glycol Packet    MIRALAX/GLYCOLAX    10 packet    Take 17 g by mouth daily as needed for constipation Daily for the next 2 days, the daily prn if less than daily bowel movement       sennosides 8.6 MG tablet    SENOKOT     Take 1 tablet by mouth 2 times daily       UNABLE TO FIND      Colostomy wafer change 2 times weekly on Tuesday and Friday       ZETIA 10 MG tablet   Generic drug:  ezetimibe     30  tablet    Take 1 tablet (10 mg) by mouth daily

## 2017-04-20 NOTE — PROGRESS NOTES
HISTORY OF PRESENT ILLNESS:  Estela is a 89 year old female (10/17/1925)  resident of [facility] who is being seen today for a routine 30 day follow up.  Patient offers no other complaint.  Staff notes no other issues other than was started on tamiflu prophylaxis due to outbreak of influenza at the facility.  Chronic conditions are stable.     Current medications, allergies, and interdisciplinary care plan are reviewed.      Patient Active Problem List    Diagnosis Date Noted     Femur fracture, left (H) 10/13/2016     Priority: Medium     Advanced directives, counseling/discussion 05/01/2015     Priority: Medium     Facial skin lesion 10/28/2013     Priority: Medium     CHF (congestive heart failure) (H) 10/28/2013     Priority: Medium     Coronary atherosclerosis 01/01/2011     Priority: Medium     Problem list name updated by automated process. Provider to review       Kyphosis 01/01/2011     Priority: Medium     Osteoarthritis 01/01/2011     Priority: Medium     Problem list name updated by automated process. Provider to review       Advanced care planning/counseling discussion 07/07/2010     Priority: Medium     Benign essential hypertension 08/14/2006     Priority: Medium     Hypothyroidism 12/11/2000     Priority: Medium          Social History     Social History     Marital status:      Spouse name: N/A     Number of children: N/A     Years of education: N/A     Occupational History      Homemaker     retired     Social History Main Topics     Smoking status: Former Smoker     Smokeless tobacco: Never Used      Comment: tried to quit yes, no passive exposure     Alcohol use No     Drug use: No     Sexual activity: Not on file     Other Topics Concern     Caffeine Concern Yes     one cup daily     Parent/Sibling W/ Cabg, Mi Or Angioplasty Before 65f 55m? No     Social History Narrative    [facility]ives at [facility]        Current Outpatient Prescriptions   Medication Sig     ZETIA 10 MG tablet  Take 1 tablet (10 mg) by mouth daily     sennosides (SENOKOT) 8.6 MG tablet Take 1 tablet by mouth 2 times daily     oxyCODONE (ROXICODONE) 5 MG immediate release tablet Take 0.5-1 tablets (2.5-5 mg) by mouth every 4 hours as needed for moderate to severe pain (Patient taking differently: Take 5 mg by mouth every 4 hours as needed for moderate to severe pain )     acetaminophen (TYLENOL) 325 MG tablet Take 2 tablets (650 mg) by mouth every 6 hours as needed for mild pain     polyethylene glycol (MIRALAX/GLYCOLAX) packet Take 17 g by mouth daily as needed for constipation Daily for the next 2 days, the daily prn if less than daily bowel movement (Patient taking differently: Take 17 g by mouth daily Daily for the next 2 days, the daily prn if less than daily bowel movement)     ipratropium (ATROVENT) 0.03 % nasal spray SPRAYS 2 SPRAYS INTO BOTH NOSTRILS EVERY 12 HOURS     ASPIRIN ADULT LOW STRENGTH 81 MG EC tablet TAKE 1 TABLET BY MOUTH DAILY     levothyroxine (SYNTHROID, LEVOTHROID) 75 MCG tablet TAKE 1 TABLET BY MOUTH DAILY     metoprolol (TOPROL-XL) 100 MG 24 hr tablet TAKE ONE TABLET BY MOUTH EVERY DAY     Hypromellose (ARTIFICIAL TEARS OP) Apply to eye 4 times daily     bisacodyl (DULCOLAX) 10 MG suppository Place 1 suppository (10 mg) rectally 2 times daily as needed for constipation     UNABLE TO FIND Colostomy wafer change 2 times weekly on Tuesday and Friday     No current facility-administered medications for this visit.      Facility-Administered Medications Ordered in Other Visits   Medication     lidocaine 1% with EPINEPHrine 1:100,000 injection       Allergies   Allergen Reactions     Atorvastatin Calcium Other (See Comments)     Increased liver function test  Lipitor       I have reviewed the care plan and do agree with the plan.        ROS:  No chest pain, shortness of breath, fever, chills, headache, nausea, vomiting, dysuria, or changes in bowel habits. Appetite is good. No pain  noted.              OBJECTIVE:  /58 (BP Location: Other (Comment), Patient Position: Chair, Cuff Size: Adult Regular)  Pulse 91  Temp 98.8  F (37.1  C) (Tympanic)  Resp 16  Wt 122 lb (55.3 kg)  SpO2 91%  BMI 24.64 kg/m2     GENERAL: Chronically ill appearing, alert, and in no acute distress  RESP: Lungs clear. No rales, rhonchi, or wheezing  CV: RRR. S1 S2 with 1-2/6 systolic murmur. No clicks or rubs.  SKIN: Age-related changes. No suspicious lesions or rashes.  PSYCH: Mentation normal, affect normal.  EXTREM: No edema.              Lab/Diagnostic data:   TSH last checked 5/2016; CBC, Creatinine and BUN were checked 10/2016        ASSESSMENT/PLAN:  1. Chronic systolic congestive heart failure (H)  No changes to current plan. Labs due in May     2. Benign essential hypertension  As above.     3. Hypothyroidism, unspecified type  As above.           Above issues stable. No changes in current medications or care plan.      Total time spent with patient visit was 25 min including patient visit, review of pertinent clinical information, and treatment plan.       Taylor Barber,   Certified Adult Nurse Practitioner  [phone]

## 2017-05-24 ENCOUNTER — MEDICAL CORRESPONDENCE (OUTPATIENT)
Dept: HEALTH INFORMATION MANAGEMENT | Facility: HOSPITAL | Age: 82
End: 2017-05-24

## 2017-05-24 ENCOUNTER — NURSING HOME VISIT (OUTPATIENT)
Dept: FAMILY MEDICINE | Facility: OTHER | Age: 82
End: 2017-05-24
Payer: COMMERCIAL

## 2017-05-24 DIAGNOSIS — I10 BENIGN ESSENTIAL HYPERTENSION: ICD-10-CM

## 2017-05-24 DIAGNOSIS — I50.22 CHRONIC SYSTOLIC CONGESTIVE HEART FAILURE (H): Primary | ICD-10-CM

## 2017-05-24 PROCEDURE — 99308 SBSQ NF CARE LOW MDM 20: CPT | Performed by: FAMILY MEDICINE

## 2017-05-24 NOTE — MR AVS SNAPSHOT
"              After Visit Summary   2017    Estela Barber    MRN: [id number]           Patient Information     Date Of Birth          10/17/1925        Visit Information        Provider Department      2017 1:22 PM Pao Chau MD [facility]        Today's Diagnoses     Chronic systolic congestive heart failure (H)    -  1    Benign essential hypertension           Follow-ups after your visit        Who to contact     If you have questions or need follow up information about today's clinic visit or your schedule please contact [facility] directly at [phone].  Normal or non-critical lab and imaging results will be communicated to you by [employer], letter or phone within 4 business days after the clinic has received the results. If you do not hear from us within 7 days, please contact the clinic through [employer] or phone. If you have a critical or abnormal lab result, we will notify you by phone as soon as possible.  Submit refill requests through [employer] or call your pharmacy and they will forward the refill request to us. Please allow 3 business days for your refill to be completed.          Additional Information About Your Visit        MyChart Information     [employer] lets you send messages to your doctor, view your test results, renew your prescriptions, schedule appointments and more. To sign up, go to [email or web address] . Click on \"Log in\" on the left side of the screen, which will take you to the Welcome page. Then click on \"Sign up Now\" on the right side of the page.     You will be asked to enter the access code listed below, as well as some personal information. Please follow the directions to create your username and password.     Your access code is: [email or web address]  Expires: 2017 12:52 PM     Your access code will  in 90 days. If you need help or a new code, please call your [facility] or [phone].        Care EveryWhere ID     This " is your Care EveryWhere ID. This could be used by other organizations to access your [place] medical records  [id number]        Your Vitals Were     Pulse Temperature Respirations Pulse Oximetry BMI (Body Mass Index)       67 97.1  F (36.2  C) 16 92% 25.13 kg/m2        Blood Pressure from Last 3 Encounters:   05/09/17 102/61   04/11/17 100/58   03/13/17 109/64    Weight from Last 3 Encounters:   05/09/17 124 lb 6.4 oz (56.4 kg)   04/11/17 122 lb (55.3 kg)   03/13/17 119 lb (54 kg)              Today, you had the following     No orders found for display         Today's Medication Changes          These changes are accurate as of: 5/24/17 11:59 PM.  If you have any questions, ask your nurse or doctor.               These medicines have changed or have updated prescriptions.        Dose/Directions    oxyCODONE 5 MG IR tablet   Commonly known as:  ROXICODONE   This may have changed:  how much to take   Used for:  Fracture, intertrochanteric, left femur, closed, initial encounter (H)        Dose:  2.5-5 mg   Take 0.5-1 tablets (2.5-5 mg) by mouth every 4 hours as needed for moderate to severe pain   Quantity:  120 tablet   Refills:  0       polyethylene glycol Packet   Commonly known as:  MIRALAX/GLYCOLAX   This may have changed:    - when to take this  - additional instructions   Used for:  Fracture, intertrochanteric, left femur, closed, initial encounter (H)        Dose:  17 g   Take 17 g by mouth daily as needed for constipation Daily for the next 2 days, the daily prn if less than daily bowel movement   Quantity:  10 packet   Refills:  0                Primary Care Provider Office Phone # Fax #    R Nirmal Wade [phone]       [facility] [place] [address] [place] [id number]        Thank you!     Thank you for choosing [facility]  for your care. Our goal is always to provide you with excellent care. Hearing back from our patients is one way we can continue to  improve our services. Please take a few minutes to complete the written survey that you may receive in the mail after your visit with us. Thank you!             Your Updated Medication List - Protect others around you: Learn how to safely use, store and throw away your medicines at www.disposemymeds.org.          This list is accurate as of: 5/24/17 11:59 PM.  Always use your most recent med list.                   Brand Name Dispense Instructions for use    acetaminophen 325 MG tablet    TYLENOL    100 tablet    Take 2 tablets (650 mg) by mouth every 6 hours as needed for mild pain       ARTIFICIAL TEARS OP      Apply to eye 4 times daily       ASPIRIN ADULT LOW STRENGTH 81 MG EC tablet   Generic drug:  aspirin     90 tablet    TAKE 1 TABLET BY MOUTH DAILY       bisacodyl 10 MG Suppository    DULCOLAX    30 suppository    Place 1 suppository (10 mg) rectally 2 times daily as needed for constipation       ipratropium 0.03 % spray    ATROVENT    30 mL    SPRAYS 2 SPRAYS INTO BOTH NOSTRILS EVERY 12 HOURS       levothyroxine 75 MCG tablet    SYNTHROID/LEVOTHROID    30 tablet    TAKE 1 TABLET BY MOUTH DAILY       metoprolol 100 MG 24 hr tablet    TOPROL-XL    30 tablet    TAKE ONE TABLET BY MOUTH EVERY DAY       oxyCODONE 5 MG IR tablet    ROXICODONE    120 tablet    Take 0.5-1 tablets (2.5-5 mg) by mouth every 4 hours as needed for moderate to severe pain       polyethylene glycol Packet    MIRALAX/GLYCOLAX    10 packet    Take 17 g by mouth daily as needed for constipation Daily for the next 2 days, the daily prn if less than daily bowel movement       sennosides 8.6 MG tablet    SENOKOT     Take 1 tablet by mouth 2 times daily       UNABLE TO FIND      Colostomy wafer change 2 times weekly on Tuesday and Friday       ZETIA 10 MG tablet   Generic drug:  ezetimibe     30 tablet    Take 1 tablet (10 mg) by mouth daily

## 2017-05-26 VITALS
TEMPERATURE: 97.1 F | BODY MASS INDEX: 25.13 KG/M2 | WEIGHT: 124.4 LBS | DIASTOLIC BLOOD PRESSURE: 61 MMHG | RESPIRATION RATE: 16 BRPM | SYSTOLIC BLOOD PRESSURE: 102 MMHG | HEART RATE: 67 BPM | OXYGEN SATURATION: 92 %

## 2017-05-26 NOTE — PROGRESS NOTES
HISTORY OF PRESENT ILLNESS:  Estela is a 89 year old female (10/17/1925)  resident of [facility] who is being seen today for a routine 30 day follow up. Patient offers no other complaint.  Staff notes no other issues.    Current medications, allergies, and interdisciplinary care plan are reviewed.      Patient Active Problem List    Diagnosis Date Noted     Femur fracture, left (H) 10/13/2016     Priority: Medium     Facial skin lesion 10/28/2013     Priority: Medium     Advanced care planning/counseling discussion 07/07/2010     Priority: Medium     Advanced directives, counseling/discussion 05/01/2015     CHF (congestive heart failure) (H) 10/28/2013     Coronary atherosclerosis 01/01/2011     Problem list name updated by automated process. Provider to review       Kyphosis 01/01/2011     Osteoarthritis 01/01/2011     Problem list name updated by automated process. Provider to review       Benign essential hypertension 08/14/2006     Hypothyroidism 12/11/2000          Social History     Social History     Marital status:      Spouse name: N/A     Number of children: N/A     Years of education: N/A     Occupational History      Homemaker     retired     Social History Main Topics     Smoking status: Former Smoker     Smokeless tobacco: Never Used      Comment: tried to quit yes, no passive exposure     Alcohol use No     Drug use: No     Sexual activity: Not on file     Other Topics Concern     Caffeine Concern Yes     one cup daily     Parent/Sibling W/ Cabg, Mi Or Angioplasty Before 65f 55m? No     Social History Narrative    Lives at [facility]        Current Outpatient Prescriptions   Medication Sig     ZETIA 10 MG tablet Take 1 tablet (10 mg) by mouth daily     sennosides (SENOKOT) 8.6 MG tablet Take 1 tablet by mouth 2 times daily     oxyCODONE (ROXICODONE) 5 MG immediate release tablet Take 0.5-1 tablets (2.5-5 mg) by mouth every 4 hours as needed for moderate to severe  pain (Patient taking differently: Take 5 mg by mouth every 4 hours as needed for moderate to severe pain )     acetaminophen (TYLENOL) 325 MG tablet Take 2 tablets (650 mg) by mouth every 6 hours as needed for mild pain     bisacodyl (DULCOLAX) 10 MG suppository Place 1 suppository (10 mg) rectally 2 times daily as needed for constipation     polyethylene glycol (MIRALAX/GLYCOLAX) packet Take 17 g by mouth daily as needed for constipation Daily for the next 2 days, the daily prn if less than daily bowel movement (Patient taking differently: Take 17 g by mouth daily Daily for the next 2 days, the daily prn if less than daily bowel movement)     ipratropium (ATROVENT) 0.03 % nasal spray SPRAYS 2 SPRAYS INTO BOTH NOSTRILS EVERY 12 HOURS     ASPIRIN ADULT LOW STRENGTH 81 MG EC tablet TAKE 1 TABLET BY MOUTH DAILY     levothyroxine (SYNTHROID, LEVOTHROID) 75 MCG tablet TAKE 1 TABLET BY MOUTH DAILY     metoprolol (TOPROL-XL) 100 MG 24 hr tablet TAKE ONE TABLET BY MOUTH EVERY DAY     Hypromellose (ARTIFICIAL TEARS OP) Apply to eye 4 times daily     UNABLE TO FIND Colostomy wafer change 2 times weekly on Tuesday and Friday     No current facility-administered medications for this visit.      Facility-Administered Medications Ordered in Other Visits   Medication     lidocaine 1% with EPINEPHrine 1:100,000 injection       Allergies   Allergen Reactions     Atorvastatin Calcium Other (See Comments)     Increased liver function test  Lipitor         I have reviewed the care plan and do agree with the plan.      ROS:  No chest pain, shortness of breath, fever, chills, headache, nausea, vomiting, dysuria, or changes in bowel habits.  Appetite is good.  No pain noted.          OBJECTIVE:  /61  Pulse 67  Temp 97.1  F (36.2  C)  Resp 16  Wt 124 lb 6.4 oz (56.4 kg)  SpO2 92%  BMI 25.13 kg/m2    GENERAL:  Chronically ill appearing, alert, and in no acute distress  RESP:  Lungs clear.  No rales, rhonchi, or wheezing  CV:   RRR.  S1 S2 without murmur. No clicks or rubs.  SKIN:  Age-related changes.  No suspicious lesions or rashes.  PSYCH:  Affect normal.  EXTREM:  No edema.      Lab/Diagnostic data:    None      ASSESSMENT/PLAN:  1. Chronic systolic congestive heart failure (H)  No changes to current plan.    2. Benign essential hypertension  No changes.        Above issues stable.  No changes in current medications or care plan.      Total time spent with patient visit was 25 min including patient visit, review of pertinent clinical information, and treatment plan.      Pao Chau MD

## 2017-06-20 ENCOUNTER — NURSING HOME VISIT (OUTPATIENT)
Dept: FAMILY MEDICINE | Facility: OTHER | Age: 82
End: 2017-06-20
Payer: COMMERCIAL

## 2017-06-20 DIAGNOSIS — I50.22 CHRONIC SYSTOLIC CONGESTIVE HEART FAILURE (H): ICD-10-CM

## 2017-06-20 DIAGNOSIS — I25.10 ATHEROSCLEROSIS OF NATIVE CORONARY ARTERY OF NATIVE HEART WITHOUT ANGINA PECTORIS: ICD-10-CM

## 2017-06-20 DIAGNOSIS — I10 BENIGN ESSENTIAL HYPERTENSION: Primary | ICD-10-CM

## 2017-06-20 PROCEDURE — 99308 SBSQ NF CARE LOW MDM 20: CPT | Performed by: NURSE PRACTITIONER

## 2017-06-20 NOTE — MR AVS SNAPSHOT
"              After Visit Summary   2017    Estela Barber    MRN: [id number]           Patient Information     Date Of Birth          10/17/1925        Visit Information        Provider Department      2017 10:18 AM Taylor Barber NP [facility]        Today's Diagnoses     Benign essential hypertension    -  1    Atherosclerosis of native coronary artery of native heart without angina pectoris        Chronic systolic congestive heart failure (H)           Follow-ups after your visit        Who to contact     If you have questions or need follow up information about today's clinic visit or your schedule please contact [facility] directly at [phone].  Normal or non-critical lab and imaging results will be communicated to you by MyChart, letter or phone within 4 business days after the clinic has received the results. If you do not hear from us within 7 days, please contact the clinic through MyChart or phone. If you have a critical or abnormal lab result, we will notify you by phone as soon as possible.  Submit refill requests through [employer] [facility] or call your pharmacy and they will forward the refill request to us. Please allow 3 business days for your refill to be completed.          Additional Information About Your Visit        MyChart Information     [facility] lets you send messages to your doctor, view your test results, renew your prescriptions, schedule appointments and more. To sign up, go to [email or web address] . Click on \"Log in\" on the left side of the screen, which will take you to the Welcome page. Then click on \"Sign up Now\" on the right side of the page.     You will be asked to enter the access code listed below, as well as some personal information. Please follow the directions to create your username and password.     Your access code is: [email or web address]  Expires: 2017 12:52 PM     Your access code will  in 90 days. If you need help or a " new code, please call your [facility] clinic or [phone].        Care EveryWhere ID     This is your Care EveryWhere ID. This could be used by other organizations to access your [facility] medical records  [id number]        Your Vitals Were     Pulse Temperature Respirations Pulse Oximetry          68 97.9  F (36.6  C) 20 95%         Blood Pressure from Last 3 Encounters:   06/13/17 110/66   05/09/17 102/61   04/11/17 100/58    Weight from Last 3 Encounters:   05/09/17 124 lb 6.4 oz (56.4 kg)   04/11/17 122 lb (55.3 kg)   03/13/17 119 lb (54 kg)              Today, you had the following     No orders found for display         Today's Medication Changes          These changes are accurate as of: 6/20/17 11:59 PM.  If you have any questions, ask your nurse or doctor.               These medicines have changed or have updated prescriptions.        Dose/Directions    polyethylene glycol Packet   Commonly known as:  MIRALAX/GLYCOLAX   This may have changed:    - when to take this  - additional instructions   Used for:  Fracture, intertrochanteric, left femur, closed, initial encounter        Dose:  17 g   Take 17 g by mouth daily as needed for constipation Daily for the next 2 days, the daily prn if less than daily bowel movement   Quantity:  10 packet   Refills:  0                Primary Care Provider Office Phone # Fax #    R Nirmal Wade [phone]       [place] [address]        Equal Access to Services     SHIRLEY LAGUNA AH: Hadii aad ku hadasho Soomaali, waaxda luqadaha, qaybta kaalmada adeegyada, waxshar ny alcantara. So [facility] [phone].    ATENCIÓN: Si habla español, tiene a norton disposición servicios gratuitos de asistencia lingüística. Llame al [phone].    We comply with applicable federal civil rights laws and [place] laws. We do not discriminate on the basis of race, color, national origin, age, disability sex, sexual  orientation or gender identity.            Thank you!     Thank you for choosing [facility]  for your care. Our goal is always to provide you with excellent care. Hearing back from our patients is one way we can continue to improve our services. Please take a few minutes to complete the written survey that you may receive in the mail after your visit with us. Thank you!             Your Updated Medication List - Protect others around you: Learn how to safely use, store and throw away your medicines at www.disposemymeds.org.          This list is accurate as of: 6/20/17 11:59 PM.  Always use your most recent med list.                   Brand Name Dispense Instructions for use Diagnosis    acetaminophen 325 MG tablet    TYLENOL    100 tablet    Take 2 tablets (650 mg) by mouth every 6 hours as needed for mild pain    Fracture, intertrochanteric, left femur, closed, initial encounter       ARTIFICIAL TEARS OP      Apply to eye 4 times daily        ASPIRIN ADULT LOW STRENGTH 81 MG EC tablet   Generic drug:  aspirin     90 tablet    TAKE 1 TABLET BY MOUTH DAILY    HTN (hypertension)       bisacodyl 10 MG Suppository    DULCOLAX    30 suppository    Place 1 suppository (10 mg) rectally 2 times daily as needed for constipation    Fracture, intertrochanteric, left femur, closed, initial encounter       ipratropium 0.03 % spray    ATROVENT    30 mL    SPRAYS 2 SPRAYS INTO BOTH NOSTRILS EVERY 12 HOURS    Seasonal allergic rhinitis       levothyroxine 75 MCG tablet    SYNTHROID/LEVOTHROID    30 tablet    TAKE 1 TABLET BY MOUTH DAILY    Hypothyroidism       metoprolol 100 MG 24 hr tablet    TOPROL-XL    30 tablet    TAKE ONE TABLET BY MOUTH EVERY DAY    HTN (hypertension)       polyethylene glycol Packet    MIRALAX/GLYCOLAX    10 packet    Take 17 g by mouth daily as needed for constipation Daily for the next 2 days, the daily prn if less than daily bowel movement    Fracture, intertrochanteric, left femur,  closed, initial encounter       sennosides 8.6 MG tablet    SENOKOT     Take 1 tablet by mouth 2 times daily        TRAMADOL HCL PO      Take 25 mg by mouth every 6 hours as needed for moderate to severe pain (1-2 every 6 hours as needed)        UNABLE TO FIND      Colostomy wafer change 2 times weekly on Tuesday and Friday        ZETIA 10 MG tablet   Generic drug:  ezetimibe     30 tablet    Take 1 tablet (10 mg) by mouth daily    Pure hypercholesterolemia

## 2017-06-21 ENCOUNTER — NURSING HOME VISIT (OUTPATIENT)
Dept: FAMILY MEDICINE | Facility: OTHER | Age: 82
End: 2017-06-21

## 2017-06-21 VITALS
DIASTOLIC BLOOD PRESSURE: 66 MMHG | SYSTOLIC BLOOD PRESSURE: 110 MMHG | TEMPERATURE: 97.9 F | RESPIRATION RATE: 20 BRPM | OXYGEN SATURATION: 95 % | HEART RATE: 68 BPM

## 2017-06-21 DIAGNOSIS — Z53.9 ERRONEOUS ENCOUNTER--DISREGARD: Primary | ICD-10-CM

## 2017-06-21 NOTE — MR AVS SNAPSHOT
"              After Visit Summary   2017    Estela Barber    MRN: [id number]           Patient Information     Date Of Birth          10/17/1925        Visit Information        Provider Department      2017 10:11 AM Taylor Barber NP [facility]        Today's Diagnoses     ERRONEOUS ENCOUNTER--DISREGARD    -  1       Follow-ups after your visit        Who to contact     If you have questions or need follow up information about today's clinic visit or your schedule please contact [facility] directly at [phone].  Normal or non-critical lab and imaging results will be communicated to you by Waterfallhart, letter or phone within 4 business days after the clinic has received the results. If you do not hear from us within 7 days, please contact the clinic through Waterfall[employer] or phone. If you have a critical or abnormal lab result, we will notify you by phone as soon as possible.  Submit refill requests through [employer] or call your pharmacy and they will forward the refill request to us. Please allow 3 business days for your refill to be completed.          Additional Information About Your Visit        MyChart Information     [facility] lets you send messages to your doctor, view your test results, renew your prescriptions, schedule appointments and more. To sign up, go to [email or web address] . Click on \"Log in\" on the left side of the screen, which will take you to the Welcome page. Then click on \"Sign up Now\" on the right side of the page.     You will be asked to enter the access code listed below, as well as some personal information. Please follow the directions to create your username and password.     Your access code is: [email or web address]  Expires: 2017 12:52 PM     Your access code will  in 90 days. If you need help or a new code, please call your [facility] or [phone].        Care EveryWhere ID     This is your Care EveryWhere ID. This could be used " by other organizations to access your [facility] medical records  [id number]         Blood Pressure from Last 3 Encounters:   05/09/17 102/61   04/11/17 100/58   03/13/17 109/64    Weight from Last 3 Encounters:   05/09/17 124 lb 6.4 oz (56.4 kg)   04/11/17 122 lb (55.3 kg)   03/13/17 119 lb (54 kg)              Today, you had the following     No orders found for display         Today's Medication Changes          These changes are accurate as of: 6/21/17 10:17 AM.  If you have any questions, ask your nurse or doctor.               These medicines have changed or have updated prescriptions.        Dose/Directions    oxyCODONE 5 MG IR tablet   Commonly known as:  ROXICODONE   This may have changed:  how much to take   Used for:  Fracture, intertrochanteric, left femur, closed, initial encounter (H)        Dose:  2.5-5 mg   Take 0.5-1 tablets (2.5-5 mg) by mouth every 4 hours as needed for moderate to severe pain   Quantity:  120 tablet   Refills:  0       polyethylene glycol Packet   Commonly known as:  MIRALAX/GLYCOLAX   This may have changed:    - when to take this  - additional instructions   Used for:  Fracture, intertrochanteric, left femur, closed, initial encounter (H)        Dose:  17 g   Take 17 g by mouth daily as needed for constipation Daily for the next 2 days, the daily prn if less than daily bowel movement   Quantity:  10 packet   Refills:  0                Primary Care Provider Office Phone # Fax #    R Nirmal Wade [phone]       [facility] [place] [address] [place] [address]        Equal Access to Services     [facility]: Hadii gertrude kirk hadasho Sodarryn, waaxda luqadaha, qaybta kaalmada adeegyalesley, juanita alcantara. So [facility] [phone].    ATENCIÓN: Si habla español, tiene a norton disposición servicios gratuitos de asistencia lingüística. Llame al [phone].    We comply with applicable federal civil rights laws and [place] laws. We  do not discriminate on the basis of race, color, national origin, age, disability sex, sexual orientation or gender identity.            Thank you!     Thank you for choosing [facility]  for your care. Our goal is always to provide you with excellent care. Hearing back from our patients is one way we can continue to improve our services. Please take a few minutes to complete the written survey that you may receive in the mail after your visit with us. Thank you!             Your Updated Medication List - Protect others around you: Learn how to safely use, store and throw away your medicines at www.disposemymeds.org.          This list is accurate as of: 6/21/17 10:17 AM.  Always use your most recent med list.                   Brand Name Dispense Instructions for use Diagnosis    acetaminophen 325 MG tablet    TYLENOL    100 tablet    Take 2 tablets (650 mg) by mouth every 6 hours as needed for mild pain    Fracture, intertrochanteric, left femur, closed, initial encounter (H)       ARTIFICIAL TEARS OP      Apply to eye 4 times daily        ASPIRIN ADULT LOW STRENGTH 81 MG EC tablet   Generic drug:  aspirin     90 tablet    TAKE 1 TABLET BY MOUTH DAILY    HTN (hypertension)       bisacodyl 10 MG Suppository    DULCOLAX    30 suppository    Place 1 suppository (10 mg) rectally 2 times daily as needed for constipation    Fracture, intertrochanteric, left femur, closed, initial encounter (H)       ipratropium 0.03 % spray    ATROVENT    30 mL    SPRAYS 2 SPRAYS INTO BOTH NOSTRILS EVERY 12 HOURS    Seasonal allergic rhinitis       levothyroxine 75 MCG tablet    SYNTHROID/LEVOTHROID    30 tablet    TAKE 1 TABLET BY MOUTH DAILY    Hypothyroidism       metoprolol 100 MG 24 hr tablet    TOPROL-XL    30 tablet    TAKE ONE TABLET BY MOUTH EVERY DAY    HTN (hypertension)       oxyCODONE 5 MG IR tablet    ROXICODONE    120 tablet    Take 0.5-1 tablets (2.5-5 mg) by mouth every 4 hours as needed for moderate to  severe pain    Fracture, intertrochanteric, left femur, closed, initial encounter (H)       polyethylene glycol Packet    MIRALAX/GLYCOLAX    10 packet    Take 17 g by mouth daily as needed for constipation Daily for the next 2 days, the daily prn if less than daily bowel movement    Fracture, intertrochanteric, left femur, closed, initial encounter (H)       sennosides 8.6 MG tablet    SENOKOT     Take 1 tablet by mouth 2 times daily        UNABLE TO FIND      Colostomy wafer change 2 times weekly on Tuesday and Friday        ZETIA 10 MG tablet   Generic drug:  ezetimibe     30 tablet    Take 1 tablet (10 mg) by mouth daily    Pure hypercholesterolemia

## 2017-06-21 NOTE — PROGRESS NOTES
This encounter was opened in error. Please disregard. Nursing home visit wrong date entered  Pamela M Lechevalier LPN

## 2017-07-06 ENCOUNTER — TELEPHONE (OUTPATIENT)
Dept: FAMILY MEDICINE | Facility: OTHER | Age: 82
End: 2017-07-06

## 2017-07-06 NOTE — TELEPHONE ENCOUNTER
NURSE FROM [facility]  Calling about patient who had brown green emesis this morning but her vitals were fine, and she felt fine,she had normal bowel sounds and had 2 BM yesterday now at 9 30 she complains of shortness of breat, but her lungs are clear, she is shaky and states not feeling well, should she be seen should they monitor or do you want any labs ordered

## 2017-07-06 NOTE — TELEPHONE ENCOUNTER
Notified caller via phone and she states the patients daughter is there and she did not think it warranted a doctors visit but the jcaller will give family your recommendation

## 2017-07-09 NOTE — PROGRESS NOTES
HISTORY OF PRESENT ILLNESS:  Estela is a 89 year old female (10/17/1925)  resident of [facility] who is being seen today for a routine 30 day follow up. Patient offers no other complaint.  Staff notes no other issues.    Current medications, allergies, and interdisciplinary care plan are reviewed.      Patient Active Problem List    Diagnosis Date Noted     Femur fracture, left (H) 10/13/2016     Priority: Medium     Advanced directives, counseling/discussion 05/01/2015     Priority: Medium     Facial skin lesion 10/28/2013     Priority: Medium     CHF (congestive heart failure) (H) 10/28/2013     Priority: Medium     Coronary atherosclerosis 01/01/2011     Priority: Medium     Problem list name updated by automated process. Provider to review       Kyphosis 01/01/2011     Priority: Medium     Osteoarthritis 01/01/2011     Priority: Medium     Problem list name updated by automated process. Provider to review       Advanced care planning/counseling discussion 07/07/2010     Priority: Medium     Benign essential hypertension 08/14/2006     Priority: Medium     Hypothyroidism 12/11/2000     Priority: Medium          Social History     Social History     Marital status:      Spouse name: N/A     Number of children: N/A     Years of education: N/A     Occupational History      Homemaker     retired     Social History Main Topics     Smoking status: Former Smoker     Smokeless tobacco: Never Used      Comment: tried to quit yes, no passive exposure     Alcohol use No     Drug use: No     Sexual activity: Not on file     Other Topics Concern     Caffeine Concern Yes     one cup daily     Parent/Sibling W/ Cabg, Mi Or Angioplasty Before 65f 55m? No     Social History Narrative    Lives at [facility]        Current Outpatient Prescriptions   Medication Sig     TRAMADOL HCL PO Take 25 mg by mouth every 6 hours as needed for moderate to severe pain (1-2 every 6 hours as needed)     ZETIA 10 MG tablet Take 1  tablet (10 mg) by mouth daily     sennosides (SENOKOT) 8.6 MG tablet Take 1 tablet by mouth 2 times daily     polyethylene glycol (MIRALAX/GLYCOLAX) packet Take 17 g by mouth daily as needed for constipation Daily for the next 2 days, the daily prn if less than daily bowel movement (Patient taking differently: Take 17 g by mouth daily Daily for the next 2 days, the daily prn if less than daily bowel movement)     ipratropium (ATROVENT) 0.03 % nasal spray SPRAYS 2 SPRAYS INTO BOTH NOSTRILS EVERY 12 HOURS     ASPIRIN ADULT LOW STRENGTH 81 MG EC tablet TAKE 1 TABLET BY MOUTH DAILY     levothyroxine (SYNTHROID, LEVOTHROID) 75 MCG tablet TAKE 1 TABLET BY MOUTH DAILY     metoprolol (TOPROL-XL) 100 MG 24 hr tablet TAKE ONE TABLET BY MOUTH EVERY DAY     Hypromellose (ARTIFICIAL TEARS OP) Apply to eye 4 times daily     acetaminophen (TYLENOL) 325 MG tablet Take 2 tablets (650 mg) by mouth every 6 hours as needed for mild pain     bisacodyl (DULCOLAX) 10 MG suppository Place 1 suppository (10 mg) rectally 2 times daily as needed for constipation     UNABLE TO FIND Colostomy wafer change 2 times weekly on Tuesday and Friday     No current facility-administered medications for this visit.      Facility-Administered Medications Ordered in Other Visits   Medication     lidocaine 1% with EPINEPHrine 1:100,000 injection       Allergies   Allergen Reactions     Atorvastatin Calcium Other (See Comments)     Increased liver function test  Lipitor         I have reviewed the care plan and do agree with the plan.      ROS:  No chest pain, shortness of breath, fever, chills, headache, nausea, vomiting, dysuria, or changes in bowel habits.  Appetite is stable.  no pain noted.          OBJECTIVE:  /66  Pulse 68  Temp 97.9  F (36.6  C)  Resp 20  SpO2 95%    GENERAL:  Chronically ill appearing, alert, and in no acute distress  RESP:  Lungs clear.  No rales, rhonchi, or wheezing  CV:  RRR.  S1 S2 with out murmur. No clicks or  rubs.  SKIN:  Age-related changes.  No suspicious lesions or rashes.  PSYCH:  Mentation intact, affect bright, and orientation intact.  EXTREM:  trace edema.  Pulses normal.          Lab/Diagnostic data:        ASSESSMENT/PLAN:  1. Benign essential hypertension      2. Atherosclerosis of native coronary artery of native heart without angina pectoris      3. Chronic systolic congestive heart failure (H)          Above issues stable.  No changes in current medications or care plan.      Total time spent with patient visit was 15 min including patient visit, review of pertinent clinical information, and treatment plan.      Taylor Barber NP

## 2017-07-18 VITALS
DIASTOLIC BLOOD PRESSURE: 78 MMHG | BODY MASS INDEX: 25.33 KG/M2 | SYSTOLIC BLOOD PRESSURE: 126 MMHG | RESPIRATION RATE: 18 BRPM | OXYGEN SATURATION: 96 % | WEIGHT: 125.4 LBS | TEMPERATURE: 97.8 F | HEART RATE: 60 BPM

## 2017-07-19 ENCOUNTER — NURSING HOME VISIT (OUTPATIENT)
Dept: FAMILY MEDICINE | Facility: OTHER | Age: 82
End: 2017-07-19
Payer: COMMERCIAL

## 2017-07-19 ENCOUNTER — MEDICAL CORRESPONDENCE (OUTPATIENT)
Dept: HEALTH INFORMATION MANAGEMENT | Facility: HOSPITAL | Age: 82
End: 2017-07-19

## 2017-07-19 DIAGNOSIS — E03.9 ACQUIRED HYPOTHYROIDISM: Primary | ICD-10-CM

## 2017-07-19 DIAGNOSIS — E78.00 PURE HYPERCHOLESTEROLEMIA: ICD-10-CM

## 2017-07-19 PROCEDURE — 99308 SBSQ NF CARE LOW MDM 20: CPT | Performed by: FAMILY MEDICINE

## 2017-07-19 NOTE — MR AVS SNAPSHOT
"              After Visit Summary   2017    Estela Barber    MRN: [id number]           Patient Information     Date Of Birth          10/17/1925        Visit Information        Provider Department      2017 9:55 AM DIETER Wade MD [facility]        Today's Diagnoses     Acquired hypothyroidism    -  1    Pure hypercholesterolemia           Follow-ups after your visit        Follow-up notes from your care team     Return in about 4 weeks (around 2017).      Who to contact     If you have questions or need follow up information about today's clinic visit or your schedule please contact [facility] directly at [phone].  Normal or non-critical lab and imaging results will be communicated to you by MyChart, letter or phone within 4 business days after the clinic has received the results. If you do not hear from us within 7 days, please contact the clinic through MyChart or phone. If you have a critical or abnormal lab result, we will notify you by phone as soon as possible.  Submit refill requests through [employer]ak[employer] or call your pharmacy and they will forward the refill request to us. Please allow 3 business days for your refill to be completed.          Additional Information About Your Visit        MyChart Information     Rakuten lets you send messages to your doctor, view your test results, renew your prescriptions, schedule appointments and more. To sign up, go to [email or web address] . Click on \"Log in\" on the left side of the screen, which will take you to the Welcome page. Then click on \"Sign up Now\" on the right side of the page.     You will be asked to enter the access code listed below, as well as some personal information. Please follow the directions to create your username and password.     Your access code is: [email or web address]  Expires: 2017 12:52 PM     Your access code will  in 90 days. If you need help or a new code, please call your [place] " clinic or [phone].        Care EveryWhere ID     This is your Care EveryWhere ID. This could be used by other organizations to access your [place] medical records  [id number]        Your Vitals Were     Pulse Temperature Respirations Pulse Oximetry BMI (Body Mass Index)       60 97.8  F (36.6  C) 18 96% 25.33 kg/m2        Blood Pressure from Last 3 Encounters:   07/18/17 126/78   06/13/17 110/66   05/09/17 102/61    Weight from Last 3 Encounters:   07/18/17 125 lb 6.4 oz (56.9 kg)   05/09/17 124 lb 6.4 oz (56.4 kg)   04/11/17 122 lb (55.3 kg)              Today, you had the following     No orders found for display         Today's Medication Changes          These changes are accurate as of: 7/19/17 11:59 PM.  If you have any questions, ask your nurse or doctor.               These medicines have changed or have updated prescriptions.        Dose/Directions    polyethylene glycol Packet   Commonly known as:  MIRALAX/GLYCOLAX   This may have changed:    - when to take this  - additional instructions   Used for:  Fracture, intertrochanteric, left femur, closed, initial encounter        Dose:  17 g   Take 17 g by mouth daily as needed for constipation Daily for the next 2 days, the daily prn if less than daily bowel movement   Quantity:  10 packet   Refills:  0                Primary Care Provider Office Phone # Fax #    R Nirmal Wade [phone]       [place] [id number]        Equal Access to Services     SHIRLEY LAGUNA AH: Hadii aad ku hadasho Soomaali, waaxda luqadaha, qaybta kaalmada adeegyada, juanita alcantara. So [facility] [phone].    ATENCIÓN: Si habla español, tiene a norton disposición servicios gratuitos de asistencia lingüística. Llame al [phone].    We comply with applicable federal civil rights laws and [place] laws. We do not discriminate on the basis of race, color, national origin, age, disability sex, sexual  orientation or gender identity.            Thank you!     Thank you for choosing [facility]  for your care. Our goal is always to provide you with excellent care. Hearing back from our patients is one way we can continue to improve our services. Please take a few minutes to complete the written survey that you may receive in the mail after your visit with us. Thank you!             Your Updated Medication List - Protect others around you: Learn how to safely use, store and throw away your medicines at www.disposemymeds.org.          This list is accurate as of: 7/19/17 11:59 PM.  Always use your most recent med list.                   Brand Name Dispense Instructions for use Diagnosis    ARTIFICIAL TEARS OP      Apply to eye 4 times daily        ASPIRIN ADULT LOW STRENGTH 81 MG EC tablet   Generic drug:  aspirin     90 tablet    TAKE 1 TABLET BY MOUTH DAILY    HTN (hypertension)       ipratropium 0.03 % spray    ATROVENT    30 mL    SPRAYS 2 SPRAYS INTO BOTH NOSTRILS EVERY 12 HOURS    Seasonal allergic rhinitis       LACRI-LUBE OP           levothyroxine 75 MCG tablet    SYNTHROID/LEVOTHROID    30 tablet    TAKE 1 TABLET BY MOUTH DAILY    Hypothyroidism       metoprolol 100 MG 24 hr tablet    TOPROL-XL    30 tablet    TAKE ONE TABLET BY MOUTH EVERY DAY    HTN (hypertension)       polyethylene glycol Packet    MIRALAX/GLYCOLAX    10 packet    Take 17 g by mouth daily as needed for constipation Daily for the next 2 days, the daily prn if less than daily bowel movement    Fracture, intertrochanteric, left femur, closed, initial encounter       sennosides 8.6 MG tablet    SENOKOT     Take 1 tablet by mouth 2 times daily        TRAMADOL HCL PO      Take 25-50 mg by mouth every 6 hours as needed for moderate to severe pain (1-2 every 6 hours as needed)        UNABLE TO FIND      Colostomy wafer change 2 times weekly on Tuesday and Friday        ZETIA 10 MG tablet   Generic drug:  ezetimibe     30 tablet     Take 1 tablet (10 mg) by mouth daily    Pure hypercholesterolemia

## 2017-07-20 PROBLEM — E78.00 PURE HYPERCHOLESTEROLEMIA: Status: ACTIVE | Noted: 2017-07-20

## 2017-07-20 NOTE — PROGRESS NOTES
HISTORY OF PRESENT ILLNESS:  Estela is a 89 year old female (10/17/1925)  resident of [place] who is being seen today for a routine 30 day follow up. She is overdue for labs. Patient offers no other complaint.  Staff notes no other issues.    Current medications, allergies, and interdisciplinary care plan are reviewed.      Patient Active Problem List    Diagnosis Date Noted     Pure hypercholesterolemia 07/20/2017     Priority: Medium     Femur fracture, left (H) 10/13/2016     Priority: Medium     Advanced directives, counseling/discussion 05/01/2015     Priority: Medium     Facial skin lesion 10/28/2013     Priority: Medium     CHF (congestive heart failure) (H) 10/28/2013     Priority: Medium     Atherosclerosis of native coronary artery of native heart with angina pectoris (H) 01/01/2011     Priority: Medium     Kyphosis 01/01/2011     Priority: Medium     Primary osteoarthritis involving multiple joints 01/01/2011     Priority: Medium     Advanced care planning/counseling discussion 07/07/2010     Priority: Medium     Benign essential hypertension 08/14/2006     Priority: Medium     Hypothyroidism 12/11/2000     Priority: Medium          Social History     Social History     Marital status:      Spouse name: N/A     Number of children: N/A     Years of education: N/A     Occupational History      Homemaker     retired     Social History Main Topics     Smoking status: Former Smoker     Smokeless tobacco: Never Used      Comment: tried to quit yes, no passive exposure     Alcohol use No     Drug use: No     Sexual activity: Not on file     Other Topics Concern     Caffeine Concern Yes     one cup daily     Parent/Sibling W/ Cabg, Mi Or Angioplasty Before 65f 55m? No     Social History Narrative    Lives at [facility]        Current Outpatient Prescriptions   Medication Sig     Artificial Tear Ointment (LACRI-LUBE OP)      TRAMADOL HCL PO Take 25-50 mg by mouth every 6 hours as needed for  moderate to severe pain (1-2 every 6 hours as needed)      ZETIA 10 MG tablet Take 1 tablet (10 mg) by mouth daily     sennosides (SENOKOT) 8.6 MG tablet Take 1 tablet by mouth 2 times daily     polyethylene glycol (MIRALAX/GLYCOLAX) packet Take 17 g by mouth daily as needed for constipation Daily for the next 2 days, the daily prn if less than daily bowel movement (Patient taking differently: Take 17 g by mouth daily Daily for the next 2 days, the daily prn if less than daily bowel movement)     ipratropium (ATROVENT) 0.03 % nasal spray SPRAYS 2 SPRAYS INTO BOTH NOSTRILS EVERY 12 HOURS     ASPIRIN ADULT LOW STRENGTH 81 MG EC tablet TAKE 1 TABLET BY MOUTH DAILY     levothyroxine (SYNTHROID, LEVOTHROID) 75 MCG tablet TAKE 1 TABLET BY MOUTH DAILY     metoprolol (TOPROL-XL) 100 MG 24 hr tablet TAKE ONE TABLET BY MOUTH EVERY DAY     Hypromellose (ARTIFICIAL TEARS OP) Apply to eye 4 times daily     UNABLE TO FIND Colostomy wafer change 2 times weekly on Tuesday and Friday     No current facility-administered medications for this visit.      Facility-Administered Medications Ordered in Other Visits   Medication     lidocaine 1% with EPINEPHrine 1:100,000 injection       Allergies   Allergen Reactions     Atorvastatin Calcium Other (See Comments)     Increased liver function test  Lipitor         I have reviewed the care plan and do agree with the plan.      ROS:  No chest pain, shortness of breath, fever, chills, headache, nausea, vomiting, dysuria, or changes in bowel habits.  Appetite is good.  No pain noted.          OBJECTIVE:  /78  Pulse 60  Temp 97.8  F (36.6  C)  Resp 18  Wt 125 lb 6.4 oz (56.9 kg)  SpO2 96%  BMI 25.33 kg/m2    GENERAL:  Chronically ill appearing, alert, and in no acute distress  RESP:  Lungs clear.  No rales, rhonchi, or wheezing  CV:  RRR.  S1 S2 normal  SKIN:  Age-related changes.  No suspicious lesions or rashes.  PSYCH:  Mentation normal, affect normal.  EXTREM:  No  edema.          Lab/Diagnostic data:    TSH and lipids ordered      ASSESSMENT/PLAN:  1. Acquired hypothyroidism  TSH ordered.  Follow-up in 4 weeks.    2. Pure hypercholesterolemia  Lipids ordered.  Due to age, could likely discontinue Zetia.        Above issues stable.  No changes in current medications or care plan.      Total time spent with patient visit was 25 min including patient visit, review of pertinent clinical information, and treatment plan.      Pao Chau MD

## 2017-07-25 ENCOUNTER — TRANSFERRED RECORDS (OUTPATIENT)
Dept: HEALTH INFORMATION MANAGEMENT | Facility: HOSPITAL | Age: 82
End: 2017-07-25

## 2017-07-25 LAB
CHOLEST SERPL-MCNC: 190 MG/DL (ref 114–200)
HDLC SERPL-MCNC: 49 MG/DL (ref 40–60)
LDLC SERPL CALC-MCNC: 118 MG/DL
TRIGL SERPL-MCNC: 116 MG/DL (ref 10–200)
TSH SERPL-ACNC: 0.46 UIU/ML (ref 0.4–3.99)

## 2017-08-09 DIAGNOSIS — K59.00 CONSTIPATION, UNSPECIFIED CONSTIPATION TYPE: Primary | ICD-10-CM

## 2017-08-11 RX ORDER — POLYETHYLENE GLYCOL 3350 17 G/17G
POWDER, FOR SOLUTION ORAL
Qty: 255 G | Refills: 11 | Status: SHIPPED | OUTPATIENT
Start: 2017-08-11

## 2017-08-25 ENCOUNTER — NURSING HOME VISIT (OUTPATIENT)
Dept: FAMILY MEDICINE | Facility: OTHER | Age: 82
End: 2017-08-25
Payer: COMMERCIAL

## 2017-08-25 ENCOUNTER — NURSING HOME VISIT (OUTPATIENT)
Dept: ORTHOPEDICS | Facility: OTHER | Age: 82
End: 2017-08-25

## 2017-08-25 DIAGNOSIS — I50.22 CHRONIC SYSTOLIC CONGESTIVE HEART FAILURE (H): Primary | ICD-10-CM

## 2017-08-25 DIAGNOSIS — E03.8 OTHER SPECIFIED HYPOTHYROIDISM: ICD-10-CM

## 2017-08-25 DIAGNOSIS — Z53.9 ERRONEOUS ENCOUNTER--DISREGARD: Primary | ICD-10-CM

## 2017-08-25 DIAGNOSIS — E78.00 PURE HYPERCHOLESTEROLEMIA: ICD-10-CM

## 2017-08-25 DIAGNOSIS — I10 BENIGN ESSENTIAL HYPERTENSION: ICD-10-CM

## 2017-08-25 PROCEDURE — 99309 SBSQ NF CARE MODERATE MDM 30: CPT | Performed by: NURSE PRACTITIONER

## 2017-08-25 NOTE — MR AVS SNAPSHOT
"              After Visit Summary   2017    Estela Barber    MRN: [id number]           Patient Information     Date Of Birth          10/17/1925        Visit Information        Provider Department      2017 8:11 AM Taylor Barber NP [facility]        Today's Diagnoses     Chronic systolic congestive heart failure (H)    -  1    Other specified hypothyroidism        Benign essential hypertension        Pure hypercholesterolemia           Follow-ups after your visit        Who to contact     If you have questions or need follow up information about today's clinic visit or your schedule please contact [facility] directly at [phone].  Normal or non-critical lab and imaging results will be communicated to you by MyChart, letter or phone within 4 business days after the clinic has received the results. If you do not hear from us within 7 days, please contact the clinic through [employer] Entertainment[employer] or phone. If you have a critical or abnormal lab result, we will notify you by phone as soon as possible.  Submit refill requests through LogicLoop or call your pharmacy and they will forward the refill request to us. Please allow 3 business days for your refill to be completed.          Additional Information About Your Visit        MyChart Information     LogicLoop lets you send messages to your doctor, view your test results, renew your prescriptions, schedule appointments and more. To sign up, go to [email or web address] . Click on \"Log in\" on the left side of the screen, which will take you to the Welcome page. Then click on \"Sign up Now\" on the right side of the page.     You will be asked to enter the access code listed below, as well as some personal information. Please follow the directions to create your username and password.     Your access code is: [email or web address]  Expires: 2017  8:09 AM     Your access code will  in 90 days. If you need help or a new code, please " call your [facility] or [phone].        Care EveryWhere ID     This is your Care EveryWhere ID. This could be used by other organizations to access your [place] medical records  [id number]        Your Vitals Were     Pulse Temperature Respirations BMI (Body Mass Index)          74 98.5  F (36.9  C) 16 25.93 kg/m2         Blood Pressure from Last 3 Encounters:   08/29/17 113/67   07/18/17 126/78   06/13/17 110/66    Weight from Last 3 Encounters:   08/29/17 128 lb 6.4 oz (58.2 kg)   07/18/17 125 lb 6.4 oz (56.9 kg)   05/09/17 124 lb 6.4 oz (56.4 kg)              Today, you had the following     No orders found for display       Primary Care Provider Office Phone # Fax #    R Nirmal Wade [phone]       [facility] [place] [address] [place], [place] [id number]        Equal Access to Services     SHIRLEY LAGUNA : Hadii aad ku hadasho Soomaali, waaxda luqadaha, qaybta kaalmada adeegyada, waxay idiin hayaan madhu robertson . So [facility] [phone].    ATENCIÓN: Si habla español, tiene a norton disposición servicios gratuitos de asistencia lingüística. Llame al [phone].    We comply with applicable federal civil rights laws and [place] laws. We do not discriminate on the basis of race, color, national origin, age, disability sex, sexual orientation or gender identity.            Thank you!     Thank you for choosing [facility]  for your care. Our goal is always to provide you with excellent care. Hearing back from our patients is one way we can continue to improve our services. Please take a few minutes to complete the written survey that you may receive in the mail after your visit with us. Thank you!             Your Updated Medication List - Protect others around you: Learn how to safely use, store and throw away your medicines at www.disposemymeds.org.          This list is accurate as of: 8/25/17 11:59 PM.  Always use your most recent med list.                    Brand Name Dispense Instructions for use Diagnosis    ARTIFICIAL TEARS OP      Apply to eye 4 times daily        ASPIRIN ADULT LOW STRENGTH 81 MG EC tablet   Generic drug:  aspirin     90 tablet    TAKE 1 TABLET BY MOUTH DAILY    HTN (hypertension)       ipratropium 0.03 % spray    ATROVENT    30 mL    SPRAYS 2 SPRAYS INTO BOTH NOSTRILS EVERY 12 HOURS    Seasonal allergic rhinitis       LASIX PO      Take 20 mg by mouth daily        levothyroxine 75 MCG tablet    SYNTHROID/LEVOTHROID    30 tablet    TAKE 1 TABLET BY MOUTH DAILY    Hypothyroidism       metoprolol 100 MG 24 hr tablet    TOPROL-XL    30 tablet    TAKE ONE TABLET BY MOUTH EVERY DAY    HTN (hypertension)       polyethylene glycol powder    MIRALAX/GLYCOLAX    255 g    TAKE BY MOUTH 17G IN 8OZ WATER/JUICE DAILY    Constipation, unspecified constipation type       potassium chloride 20 MEQ Packet    KLOR-CON     Take 20 mEq by mouth 2 times daily        sennosides 8.6 MG tablet    SENOKOT     Take 1 tablet by mouth 2 times daily        TRAMADOL HCL PO      Take 25-50 mg by mouth every 6 hours as needed for moderate to severe pain (1-2 every 6 hours as needed)        UNABLE TO FIND      Colostomy wafer change 2 times weekly on Tuesday and Friday        ZETIA 10 MG tablet   Generic drug:  ezetimibe     30 tablet    Take 1 tablet (10 mg) by mouth daily    Pure hypercholesterolemia

## 2017-08-25 NOTE — MR AVS SNAPSHOT
"              After Visit Summary   2017    Estela Barber    MRN: [id number]           Patient Information     Date Of Birth          10/17/1925        Visit Information        Provider Department      2017 8:07 AM Taylor Barber NP [facility]        Today's Diagnoses     ERRONEOUS ENCOUNTER--DISREGARD    -  1       Follow-ups after your visit        Who to contact     If you have questions or need follow up information about today's clinic visit or your schedule please contact [facility] directly at [phone].  Normal or non-critical lab and imaging results will be communicated to you by [employer]hart, letter or phone within 4 business days after the clinic has received the results. If you do not hear from us within 7 days, please contact the clinic through Health[employer]hart or phone. If you have a critical or abnormal lab result, we will notify you by phone as soon as possible.  Submit refill requests through [employer]iSense or call your pharmacy and they will forward the refill request to us. Please allow 3 business days for your refill to be completed.          Additional Information About Your Visit        MyChart Information     CritiSense lets you send messages to your doctor, view your test results, renew your prescriptions, schedule appointments and more. To sign up, go to [email or web address] . Click on \"Log in\" on the left side of the screen, which will take you to the Welcome page. Then click on \"Sign up Now\" on the right side of the page.     You will be asked to enter the access code listed below, as well as some personal information. Please follow the directions to create your username and password.     Your access code is: [email or web address]  Expires: 2017  8:09 AM     Your access code will  in 90 days. If you need help or a new code, please call your [facility] or [phone].        Care EveryWhere ID     This is your Care EveryWhere ID. This could be used " by other organizations to access your [place] medical records  [id number]         Blood Pressure from Last 3 Encounters:   07/18/17 126/78   06/13/17 110/66   05/09/17 102/61    Weight from Last 3 Encounters:   07/18/17 125 lb 6.4 oz (56.9 kg)   05/09/17 124 lb 6.4 oz (56.4 kg)   04/11/17 122 lb (55.3 kg)              Today, you had the following     No orders found for display         Today's Medication Changes          These changes are accurate as of: 8/25/17 11:59 PM.  If you have any questions, ask your nurse or doctor.               These medicines have changed or have updated prescriptions.        Dose/Directions    * polyethylene glycol Packet   Commonly known as:  MIRALAX/GLYCOLAX   This may have changed:    - when to take this  - additional instructions   Used for:  Fracture, intertrochanteric, left femur, closed, initial encounter        Dose:  17 g   Take 17 g by mouth daily as needed for constipation Daily for the next 2 days, the daily prn if less than daily bowel movement   Quantity:  10 packet   Refills:  0       * polyethylene glycol powder   Commonly known as:  MIRALAX/GLYCOLAX   This may have changed:  Another medication with the same name was changed. Make sure you understand how and when to take each.   Used for:  Constipation, unspecified constipation type        TAKE BY MOUTH 17G IN 8OZ WATER/JUICE DAILY   Quantity:  255 g   Refills:  11       * Notice:  This list has 2 medication(s) that are the same as other medications prescribed for you. Read the directions carefully, and ask your doctor or other care provider to review them with you.             Primary Care Provider Office Phone # Fax #    R Nirmal Wade [phone]       [facility] [place] [address] [place], [place] [address] [address]        Equal Access to Services     SKYLER LAGUNA AH: Humberto Aden, karla parrish, juanita meadows. So [facility]  [phone].    ATENCIÓN: Si sandy kimbrough, tiene a norton disposición servicios gratuitos de asistencia lingüística. Jerald smith [phone].    We comply with applicable federal civil rights laws and [place] laws. We do not discriminate on the basis of race, color, national origin, age, disability sex, sexual orientation or gender identity.            Thank you!     Thank you for choosing [facility]  for your care. Our goal is always to provide you with excellent care. Hearing back from our patients is one way we can continue to improve our services. Please take a few minutes to complete the written survey that you may receive in the mail after your visit with us. Thank you!             Your Updated Medication List - Protect others around you: Learn how to safely use, store and throw away your medicines at www.disposemymeds.org.          This list is accurate as of: 8/25/17 11:59 PM.  Always use your most recent med list.                   Brand Name Dispense Instructions for use Diagnosis    ARTIFICIAL TEARS OP      Apply to eye 4 times daily        ASPIRIN ADULT LOW STRENGTH 81 MG EC tablet   Generic drug:  aspirin     90 tablet    TAKE 1 TABLET BY MOUTH DAILY    HTN (hypertension)       ipratropium 0.03 % spray    ATROVENT    30 mL    SPRAYS 2 SPRAYS INTO BOTH NOSTRILS EVERY 12 HOURS    Seasonal allergic rhinitis       LACRI-LUBE OP           levothyroxine 75 MCG tablet    SYNTHROID/LEVOTHROID    30 tablet    TAKE 1 TABLET BY MOUTH DAILY    Hypothyroidism       metoprolol 100 MG 24 hr tablet    TOPROL-XL    30 tablet    TAKE ONE TABLET BY MOUTH EVERY DAY    HTN (hypertension)       * polyethylene glycol Packet    MIRALAX/GLYCOLAX    10 packet    Take 17 g by mouth daily as needed for constipation Daily for the next 2 days, the daily prn if less than daily bowel movement    Fracture, intertrochanteric, left femur, closed, initial encounter       * polyethylene glycol powder    MIRALAX/GLYCOLAX    255  g    TAKE BY MOUTH 17G IN 8OZ WATER/JUICE DAILY    Constipation, unspecified constipation type       sennosides 8.6 MG tablet    SENOKOT     Take 1 tablet by mouth 2 times daily        TRAMADOL HCL PO      Take 25-50 mg by mouth every 6 hours as needed for moderate to severe pain (1-2 every 6 hours as needed)        UNABLE TO FIND      Colostomy wafer change 2 times weekly on Tuesday and Friday        ZETIA 10 MG tablet   Generic drug:  ezetimibe     30 tablet    Take 1 tablet (10 mg) by mouth daily    Pure hypercholesterolemia       * Notice:  This list has 2 medication(s) that are the same as other medications prescribed for you. Read the directions carefully, and ask your doctor or other care provider to review them with you.

## 2017-08-29 ENCOUNTER — NURSING HOME VISIT (OUTPATIENT)
Dept: FAMILY MEDICINE | Facility: OTHER | Age: 82
End: 2017-08-29

## 2017-08-29 VITALS
SYSTOLIC BLOOD PRESSURE: 113 MMHG | HEART RATE: 74 BPM | TEMPERATURE: 98.5 F | WEIGHT: 128.4 LBS | DIASTOLIC BLOOD PRESSURE: 67 MMHG | BODY MASS INDEX: 25.93 KG/M2 | RESPIRATION RATE: 16 BRPM

## 2017-08-29 DIAGNOSIS — Z53.9 ERRONEOUS ENCOUNTER--DISREGARD: Primary | ICD-10-CM

## 2017-08-29 RX ORDER — POTASSIUM CHLORIDE 1.5 G/1.58G
20 POWDER, FOR SOLUTION ORAL 2 TIMES DAILY
COMMUNITY
End: 2017-09-20

## 2017-08-29 NOTE — MR AVS SNAPSHOT
"              After Visit Summary   2017    Estela Barber    MRN: [id number]           Patient Information     Date Of Birth          10/17/1925        Visit Information        Provider Department      2017 8:09 AM Taylor Barber NP [facility]        Today's Diagnoses     ERRONEOUS ENCOUNTER--DISREGARD    -  1       Follow-ups after your visit        Who to contact     If you have questions or need follow up information about today's clinic visit or your schedule please contact [facility] directly at [phone].  Normal or non-critical lab and imaging results will be communicated to you by [employer], letter or phone within 4 business days after the clinic has received the results. If you do not hear from us within 7 days, please contact the clinic through [employer] or phone. If you have a critical or abnormal lab result, we will notify you by phone as soon as possible.  Submit refill requests through [employer] or call your pharmacy and they will forward the refill request to us. Please allow 3 business days for your refill to be completed.          Additional Information About Your Visit        MyChart Information     [employer] lets you send messages to your doctor, view your test results, renew your prescriptions, schedule appointments and more. To sign up, go to [email or web address] . Click on \"Log in\" on the left side of the screen, which will take you to the Welcome page. Then click on \"Sign up Now\" on the right side of the page.     You will be asked to enter the access code listed below, as well as some personal information. Please follow the directions to create your username and password.     Your access code is: [email or web address]  Expires: 2017  8:09 AM     Your access code will  in 90 days. If you need help or a new code, please call your [facility] or [phone].        Care EveryWhere ID     This is your Care EveryWhere ID. This could be used " by other organizations to access your [place] medical records  [id number]         Blood Pressure from Last 3 Encounters:   07/18/17 126/78   06/13/17 110/66   05/09/17 102/61    Weight from Last 3 Encounters:   07/18/17 125 lb 6.4 oz (56.9 kg)   05/09/17 124 lb 6.4 oz (56.4 kg)   04/11/17 122 lb (55.3 kg)              Today, you had the following     No orders found for display         Today's Medication Changes          These changes are accurate as of: 8/29/17  8:11 AM.  If you have any questions, ask your nurse or doctor.               These medicines have changed or have updated prescriptions.        Dose/Directions    * polyethylene glycol Packet   Commonly known as:  MIRALAX/GLYCOLAX   This may have changed:    - when to take this  - additional instructions   Used for:  Fracture, intertrochanteric, left femur, closed, initial encounter        Dose:  17 g   Take 17 g by mouth daily as needed for constipation Daily for the next 2 days, the daily prn if less than daily bowel movement   Quantity:  10 packet   Refills:  0       * polyethylene glycol powder   Commonly known as:  MIRALAX/GLYCOLAX   This may have changed:  Another medication with the same name was changed. Make sure you understand how and when to take each.   Used for:  Constipation, unspecified constipation type        TAKE BY MOUTH 17G IN 8OZ WATER/JUICE DAILY   Quantity:  255 g   Refills:  11       * Notice:  This list has 2 medication(s) that are the same as other medications prescribed for you. Read the directions carefully, and ask your doctor or other care provider to review them with you.             Primary Care Provider Office Phone # Fax #    R Nirmal Wade [phone]       [facility] [place] [address] [place], [place] [address] [address]        Equal Access to Services     SKYLER LAGUNA AH: Humberto Aden, karla parrish, juanita meadows. So [facility]  [phone].    ATENCIÓN: Si sandy kimbrough, tiene a norton disposición servicios gratuitos de asistencia lingüística. Jerald smith [phone].    We comply with applicable federal civil rights laws and [place] laws. We do not discriminate on the basis of race, color, national origin, age, disability sex, sexual orientation or gender identity.            Thank you!     Thank you for choosing [facility]  for your care. Our goal is always to provide you with excellent care. Hearing back from our patients is one way we can continue to improve our services. Please take a few minutes to complete the written survey that you may receive in the mail after your visit with us. Thank you!             Your Updated Medication List - Protect others around you: Learn how to safely use, store and throw away your medicines at www.disposemymeds.org.          This list is accurate as of: 8/29/17  8:11 AM.  Always use your most recent med list.                   Brand Name Dispense Instructions for use Diagnosis    ARTIFICIAL TEARS OP      Apply to eye 4 times daily        ASPIRIN ADULT LOW STRENGTH 81 MG EC tablet   Generic drug:  aspirin     90 tablet    TAKE 1 TABLET BY MOUTH DAILY    HTN (hypertension)       ipratropium 0.03 % spray    ATROVENT    30 mL    SPRAYS 2 SPRAYS INTO BOTH NOSTRILS EVERY 12 HOURS    Seasonal allergic rhinitis       LACRI-LUBE OP           levothyroxine 75 MCG tablet    SYNTHROID/LEVOTHROID    30 tablet    TAKE 1 TABLET BY MOUTH DAILY    Hypothyroidism       metoprolol 100 MG 24 hr tablet    TOPROL-XL    30 tablet    TAKE ONE TABLET BY MOUTH EVERY DAY    HTN (hypertension)       * polyethylene glycol Packet    MIRALAX/GLYCOLAX    10 packet    Take 17 g by mouth daily as needed for constipation Daily for the next 2 days, the daily prn if less than daily bowel movement    Fracture, intertrochanteric, left femur, closed, initial encounter       * polyethylene glycol powder    MIRALAX/GLYCOLAX    255  g    TAKE BY MOUTH 17G IN 8OZ WATER/JUICE DAILY    Constipation, unspecified constipation type       sennosides 8.6 MG tablet    SENOKOT     Take 1 tablet by mouth 2 times daily        TRAMADOL HCL PO      Take 25-50 mg by mouth every 6 hours as needed for moderate to severe pain (1-2 every 6 hours as needed)        UNABLE TO FIND      Colostomy wafer change 2 times weekly on Tuesday and Friday        ZETIA 10 MG tablet   Generic drug:  ezetimibe     30 tablet    Take 1 tablet (10 mg) by mouth daily    Pure hypercholesterolemia       * Notice:  This list has 2 medication(s) that are the same as other medications prescribed for you. Read the directions carefully, and ask your doctor or other care provider to review them with you.

## 2017-09-05 NOTE — PROGRESS NOTES
HISTORY OF PRESENT ILLNESS:  Estela is a 89 year old female (10/17/1925)  resident of [facility] who is being seen today for a routine 30 day follow up. Main concern is increased weight, looses cough for the past few days.  She is not on diuretic.      Current medications, allergies, and interdisciplinary care plan are reviewed.      Patient Active Problem List    Diagnosis Date Noted     Pure hypercholesterolemia 07/20/2017     Priority: Medium     Femur fracture, left (H) 10/13/2016     Priority: Medium     Advanced directives, counseling/discussion 05/01/2015     Priority: Medium     Facial skin lesion 10/28/2013     Priority: Medium     CHF (congestive heart failure) (H) 10/28/2013     Priority: Medium     Atherosclerosis of native coronary artery of native heart with angina pectoris (H) 01/01/2011     Priority: Medium     Kyphosis 01/01/2011     Priority: Medium     Primary osteoarthritis involving multiple joints 01/01/2011     Priority: Medium     Advanced care planning/counseling discussion 07/07/2010     Priority: Medium     Benign essential hypertension 08/14/2006     Priority: Medium     Hypothyroidism 12/11/2000     Priority: Medium          Social History     Social History     Marital status:      Spouse name: N/A     Number of children: N/A     Years of education: N/A     Occupational History      Homemaker     retired     Social History Main Topics     Smoking status: Former Smoker     Smokeless tobacco: Never Used      Comment: tried to quit yes, no passive exposure     Alcohol use No     Drug use: No     Sexual activity: Not on file     Other Topics Concern     Caffeine Concern Yes     one cup daily     Parent/Sibling W/ Cabg, Mi Or Angioplasty Before 65f 55m? No     Social History Narrative    Lives at [facility]        Current Outpatient Prescriptions   Medication Sig     Furosemide (LASIX PO) Take 20 mg by mouth daily     potassium chloride (KLOR-CON) 20 MEQ Packet Take 20 mEq  by mouth 2 times daily     polyethylene glycol (MIRALAX/GLYCOLAX) powder TAKE BY MOUTH 17G IN 8OZ WATER/JUICE DAILY     TRAMADOL HCL PO Take 25-50 mg by mouth every 6 hours as needed for moderate to severe pain (1-2 every 6 hours as needed)      ZETIA 10 MG tablet Take 1 tablet (10 mg) by mouth daily     sennosides (SENOKOT) 8.6 MG tablet Take 1 tablet by mouth 2 times daily     ipratropium (ATROVENT) 0.03 % nasal spray SPRAYS 2 SPRAYS INTO BOTH NOSTRILS EVERY 12 HOURS     ASPIRIN ADULT LOW STRENGTH 81 MG EC tablet TAKE 1 TABLET BY MOUTH DAILY     levothyroxine (SYNTHROID, LEVOTHROID) 75 MCG tablet TAKE 1 TABLET BY MOUTH DAILY     metoprolol (TOPROL-XL) 100 MG 24 hr tablet TAKE ONE TABLET BY MOUTH EVERY DAY     Hypromellose (ARTIFICIAL TEARS OP) Apply to eye 4 times daily     UNABLE TO FIND Colostomy wafer change 2 times weekly on Tuesday and Friday     No current facility-administered medications for this visit.      Facility-Administered Medications Ordered in Other Visits   Medication     lidocaine 1% with EPINEPHrine 1:100,000 injection       Allergies   Allergen Reactions     Atorvastatin Calcium Other (See Comments)     Increased liver function test  Lipitor         I have reviewed the care plan and do agree with the plan.      ROS:  No chest pain, shortness of breath, fever, chills, headache, nausea, vomiting, dysuria, or changes in bowel habits.  Appetite is fair.  denies pain.          OBJECTIVE:  /67  Pulse 74  Temp 98.5  F (36.9  C)  Resp 16  Wt 128 lb 6.4 oz (58.2 kg)  BMI 25.93 kg/m2    GENERAL:  Chronically ill appearing, alert, and in no acute distress  RESP:  Lungs clear.  No rales, rhonchi, or wheezing  CV:  RRR.  S1 S2 with out murmur. No clicks or rubs.  SKIN:  Age-related changes.  No suspicious lesions or rashes.  PSYCH:  Mentation stable, mildly confused, affect brigh.  EXTREM:  +1 pedal edema to mid calf edema.  Pulses intact.          Lab/Diagnostic data:     reviewed      ASSESSMENT/PLAN:  1. Chronic systolic congestive heart failure (H)  Lasix 20mg once daily for three days along with 10Meq potassium.  Weight daily for one week and monitor.  If weight starts to increase once diuretic is completed, may need to start daily.      2. Other specified hypothyroidism  Continue current plan    3. Benign essential hypertension  Continue current plan    4. Pure hypercholesterolemia  Continue current plan        Above issues stable.  No changes in current medications or care plan.      Total time spent with patient visit was 25 min including patient visit, review of pertinent clinical information, and treatment plan.      Taylor Barber NP

## 2017-09-06 ENCOUNTER — TRANSFERRED RECORDS (OUTPATIENT)
Dept: HEALTH INFORMATION MANAGEMENT | Facility: HOSPITAL | Age: 82
End: 2017-09-06

## 2017-09-07 ENCOUNTER — MEDICAL CORRESPONDENCE (OUTPATIENT)
Dept: HEALTH INFORMATION MANAGEMENT | Facility: HOSPITAL | Age: 82
End: 2017-09-07

## 2017-09-20 ENCOUNTER — NURSING HOME VISIT (OUTPATIENT)
Dept: FAMILY MEDICINE | Facility: OTHER | Age: 82
End: 2017-09-20
Payer: COMMERCIAL

## 2017-09-20 VITALS
WEIGHT: 126.4 LBS | DIASTOLIC BLOOD PRESSURE: 73 MMHG | TEMPERATURE: 98.7 F | SYSTOLIC BLOOD PRESSURE: 122 MMHG | OXYGEN SATURATION: 90 % | HEART RATE: 82 BPM | BODY MASS INDEX: 25.53 KG/M2

## 2017-09-20 DIAGNOSIS — F03.90 DEMENTIA WITHOUT BEHAVIORAL DISTURBANCE, UNSPECIFIED DEMENTIA TYPE: Primary | ICD-10-CM

## 2017-09-20 DIAGNOSIS — I10 BENIGN ESSENTIAL HYPERTENSION: ICD-10-CM

## 2017-09-20 DIAGNOSIS — I50.22 CHRONIC SYSTOLIC CONGESTIVE HEART FAILURE (H): ICD-10-CM

## 2017-09-20 PROCEDURE — 99308 SBSQ NF CARE LOW MDM 20: CPT | Performed by: FAMILY MEDICINE

## 2017-09-20 RX ORDER — CALCIUM POLYCARBOPHIL 625 MG 625 MG/1
1 TABLET ORAL DAILY
COMMUNITY

## 2017-09-20 NOTE — MR AVS SNAPSHOT
"              After Visit Summary   9/20/2017    Estela Barber    MRN: [id number]           Patient Information     Date Of Birth          10/17/1925        Visit Information        Provider Department      9/20/2017 9:09 AM Pao Chau MD [facility]        Today's Diagnoses     Dementia without behavioral disturbance, unspecified dementia type    -  1    Chronic systolic congestive heart failure (H)        Benign essential hypertension           Follow-ups after your visit        Follow-up notes from your care team     Return in about 4 weeks (around 10/18/2017).      Who to contact     If you have questions or need follow up information about today's clinic visit or your schedule please contact [facility] directly at [phone].  Normal or non-critical lab and imaging results will be communicated to you by MyChart, letter or phone within 4 business days after the clinic has received the results. If you do not hear from us within 7 days, please contact the clinic through MyChart or phone. If you have a critical or abnormal lab result, we will notify you by phone as soon as possible.  Submit refill requests through [employer] or call your pharmacy and they will forward the refill request to us. Please allow 3 business days for your refill to be completed.          Additional Information About Your Visit        MyChart Information     [employer] lets you send messages to your doctor, view your test results, renew your prescriptions, schedule appointments and more. To sign up, go to [email or web address] . Click on \"Log in\" on the left side of the screen, which will take you to the Welcome page. Then click on \"Sign up Now\" on the right side of the page.     You will be asked to enter the access code listed below, as well as some personal information. Please follow the directions to create your username and password.     Your access code is: [email or web address]  Expires: 11/27/2017  8:09 " AM     Your access code will  in 90 days. If you need help or a new code, please call your [place] clinic or [phone].        Care EveryWhere ID     This is your Care EveryWhere ID. This could be used by other organizations to access your [place] medical records  [id number]        Your Vitals Were     Pulse Temperature Pulse Oximetry BMI (Body Mass Index)          82 98.7  F (37.1  C) 90% 25.53 kg/m2         Blood Pressure from Last 3 Encounters:   17 122/73   17 113/67   17 126/78    Weight from Last 3 Encounters:   17 126 lb 6.4 oz (57.3 kg)   17 128 lb 6.4 oz (58.2 kg)   17 125 lb 6.4 oz (56.9 kg)              Today, you had the following     No orders found for display         Today's Medication Changes          These changes are accurate as of: 17 11:59 PM.  If you have any questions, ask your nurse or doctor.               Stop taking these medicines if you haven't already. Please contact your care team if you have questions.     potassium chloride 20 MEQ Packet   Commonly known as:  KLOR-CON   Stopped by:  Pao Chau MD                    Primary Care Provider Office Phone # Fax #    R Nirmal Wade [phone]       [place] [address]        Equal Access to Services     SHIRLEY LAGUNA AH: Hadii aad ku hadasho Soomaali, waaxda luqadaha, qaybta kaalmada adeegyada, waxshar ny alcantara. So [facility] [phone].    ATENCIÓN: Si habla español, tiene a norton disposición servicios gratuitos de asistencia lingüística. Llame al [phone].    We comply with applicable federal civil rights laws and [place] laws. We do not discriminate on the basis of race, color, national origin, age, disability sex, sexual orientation or gender identity.            Thank you!     Thank you for choosing [facility]  for your care. Our goal is always to provide you with excellent  care. Hearing back from our patients is one way we can continue to improve our services. Please take a few minutes to complete the written survey that you may receive in the mail after your visit with us. Thank you!             Your Updated Medication List - Protect others around you: Learn how to safely use, store and throw away your medicines at www.disposemymeds.org.          This list is accurate as of: 9/20/17 11:59 PM.  Always use your most recent med list.                   Brand Name Dispense Instructions for use Diagnosis    ARTIFICIAL TEARS OP      Apply to eye 4 times daily        ASPIRIN ADULT LOW STRENGTH 81 MG EC tablet   Generic drug:  aspirin     90 tablet    TAKE 1 TABLET BY MOUTH DAILY    HTN (hypertension)       calcium polycarbophil 625 MG tablet    FIBERCON     Take 1 tablet by mouth daily        ipratropium 0.03 % spray    ATROVENT    30 mL    SPRAYS 2 SPRAYS INTO BOTH NOSTRILS EVERY 12 HOURS    Seasonal allergic rhinitis       levothyroxine 75 MCG tablet    SYNTHROID/LEVOTHROID    30 tablet    TAKE 1 TABLET BY MOUTH DAILY    Hypothyroidism       metoprolol 100 MG 24 hr tablet    TOPROL-XL    30 tablet    TAKE ONE TABLET BY MOUTH EVERY DAY    HTN (hypertension)       polyethylene glycol powder    MIRALAX/GLYCOLAX    255 g    TAKE BY MOUTH 17G IN 8OZ WATER/JUICE DAILY    Constipation, unspecified constipation type       sennosides 8.6 MG tablet    SENOKOT     Take 1 tablet by mouth 2 times daily        TRAMADOL HCL PO      Take 25-50 mg by mouth every 6 hours as needed for moderate to severe pain (1-2 every 6 hours as needed)        UNABLE TO FIND      Colostomy wafer change 2 times weekly on Tuesday and Friday        UNABLE TO FIND      MEDICATION NAME: LACRILUBE OPHTHALMIC 1/2 INCH THREAD DAILY        ZETIA 10 MG tablet   Generic drug:  ezetimibe     30 tablet    Take 1 tablet (10 mg) by mouth daily    Pure hypercholesterolemia

## 2017-09-21 PROBLEM — F03.90 DEMENTIA WITHOUT BEHAVIORAL DISTURBANCE, UNSPECIFIED DEMENTIA TYPE: Status: ACTIVE | Noted: 2017-09-21

## 2017-09-21 NOTE — PROGRESS NOTES
HISTORY OF PRESENT ILLNESS:  Estela is a 89 year old female (10/17/1925)  resident of [facility] who is being seen today for a routine 30 day follow up. She continues to have a cough, but it is slowly improving.  Recent CBC and CXR were overall negative.. Patient offers no other complaint.  Staff notes no other issues.    Current medications, allergies, and interdisciplinary care plan are reviewed.      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 (H) 01/01/2011     Priority: Medium     Kyphosis 01/01/2011     Priority: Medium     Primary osteoarthritis involving multiple joints 01/01/2011     Priority: Medium     Advanced care planning/counseling discussion 07/07/2010     Priority: Medium     Benign essential hypertension 08/14/2006     Priority: Medium     Hypothyroidism 12/11/2000     Priority: Medium          Social History     Social History     Marital status:      Spouse name: N/A     Number of children: N/A     Years of education: N/A     Occupational History      Homemaker     retired     Social History Main Topics     Smoking status: Former Smoker     Smokeless tobacco: Never Used      Comment: tried to quit yes, no passive exposure     Alcohol use No     Drug use: No     Sexual activity: Not on file     Other Topics Concern     Caffeine Concern Yes     one cup daily     Parent/Sibling W/ Cabg, Mi Or Angioplasty Before 65f 55m? No     Social History Narrative    Lives  at [facility]        Current Outpatient Prescriptions   Medication Sig     calcium polycarbophil (FIBERCON) 625 MG tablet Take 1 tablet by mouth daily     UNABLE TO FIND MEDICATION NAME: LACRILUBE OPHTHALMIC 1/2 INCH THREAD DAILY     polyethylene glycol (MIRALAX/GLYCOLAX) powder TAKE BY MOUTH 17G IN 8OZ WATER/JUICE DAILY     TRAMADOL HCL PO Take 25-50 mg by mouth every 6 hours as needed for moderate to severe pain (1-2 every 6 hours as needed)      ZETIA 10 MG tablet Take 1 tablet (10 mg) by mouth daily     sennosides (SENOKOT) 8.6 MG tablet Take 1 tablet by mouth 2 times daily     ipratropium (ATROVENT) 0.03 % nasal spray SPRAYS 2 SPRAYS INTO BOTH NOSTRILS EVERY 12 HOURS     ASPIRIN ADULT LOW STRENGTH 81 MG EC tablet TAKE 1 TABLET BY MOUTH DAILY     levothyroxine (SYNTHROID, LEVOTHROID) 75 MCG tablet TAKE 1 TABLET BY MOUTH DAILY     metoprolol (TOPROL-XL) 100 MG 24 hr tablet TAKE ONE TABLET BY MOUTH EVERY DAY     Hypromellose (ARTIFICIAL TEARS OP) Apply to eye 4 times daily     UNABLE TO FIND Colostomy wafer change 2 times weekly on Tuesday and Friday     No current facility-administered medications for this visit.      Facility-Administered Medications Ordered in Other Visits   Medication     lidocaine 1% with EPINEPHrine 1:100,000 injection       Allergies   Allergen Reactions     Atorvastatin Calcium Other (See Comments)     Increased liver function test  Lipitor         I have reviewed the care plan and do agree with the plan.      ROS:  No chest pain, shortness of breath, fever, chills, headache, nausea, vomiting, dysuria, or changes in bowel habits.  Appetite is good.  No pain noted.          OBJECTIVE:  /73  Pulse 82  Temp 98.7  F (37.1  C)  Wt 126 lb 6.4 oz (57.3 kg)  SpO2 90%  BMI 25.53 kg/m2    GENERAL:  Chronically ill appearing, alert, and in no acute distress  RESP:  Lungs clear.  No rales, rhonchi, or wheezing  CV:  RRR.  S1 S2 normal  SKIN:  Age-related changes.  No suspicious  lesions or rashes.  PSYCH:  Mentation normal, affect normal.  EXTREM:  No edema.          Lab/Diagnostic data:    Updated in July      ASSESSMENT/PLAN:  1. Dementia without behavioral disturbance, unspecified dementia type  No changes to current care plan.    2. Chronic systolic congestive heart failure (H)  No changes    3. Benign essential hypertension  No changes    OK for update of TDap        Above issues stable.  No changes in current medications or care plan.      Total time spent with patient visit was 25 min including patient visit, review of pertinent clinical information, and treatment plan.      Pao Chau MD

## 2017-10-17 ENCOUNTER — NURSING HOME VISIT (OUTPATIENT)
Dept: FAMILY MEDICINE | Facility: OTHER | Age: 82
End: 2017-10-17
Payer: COMMERCIAL

## 2017-10-17 DIAGNOSIS — I10 BENIGN ESSENTIAL HYPERTENSION: ICD-10-CM

## 2017-10-17 DIAGNOSIS — F03.90 DEMENTIA WITHOUT BEHAVIORAL DISTURBANCE, UNSPECIFIED DEMENTIA TYPE: ICD-10-CM

## 2017-10-17 DIAGNOSIS — I50.22 CHRONIC SYSTOLIC CONGESTIVE HEART FAILURE (H): ICD-10-CM

## 2017-10-17 DIAGNOSIS — E03.8 OTHER SPECIFIED HYPOTHYROIDISM: Primary | ICD-10-CM

## 2017-10-17 PROCEDURE — 99308 SBSQ NF CARE LOW MDM 20: CPT | Performed by: NURSE PRACTITIONER

## 2017-10-17 NOTE — MR AVS SNAPSHOT
"              After Visit Summary   10/17/2017    Estela Barber    MRN: [id number]           Patient Information     Date Of Birth          10/17/1925        Visit Information        Provider Department      10/17/2017 8:58 AM Taylor Barber NP [facility]        Today's Diagnoses     Other specified hypothyroidism    -  1    Dementia without behavioral disturbance, unspecified dementia type        Benign essential hypertension        Chronic systolic congestive heart failure (H)           Follow-ups after your visit        Follow-up notes from your care team     Return if symptoms worsen or fail to improve.      Who to contact     If you have questions or need follow up information about today's clinic visit or your schedule please contact [facility] directly at [phone].  Normal or non-critical lab and imaging results will be communicated to you by Heyohart, letter or phone within 4 business days after the clinic has received the results. If you do not hear from us within 7 days, please contact the clinic through Heyohart or phone. If you have a critical or abnormal lab result, we will notify you by phone as soon as possible.  Submit refill requests through [employer] or call your pharmacy and they will forward the refill request to us. Please allow 3 business days for your refill to be completed.          Additional Information About Your Visit        MyChart Information     [employer] lets you send messages to your doctor, view your test results, renew your prescriptions, schedule appointments and more. To sign up, go to [email or web address] . Click on \"Log in\" on the left side of the screen, which will take you to the Welcome page. Then click on \"Sign up Now\" on the right side of the page.     You will be asked to enter the access code listed below, as well as some personal information. Please follow the directions to create your username and password.     Your access " code is: [email or web address]  Expires: 2017  8:09 AM     Your access code will  in 90 days. If you need help or a new code, please call your [facility] or [phone].        Care EveryWhere ID     This is your Care EveryWhere ID. This could be used by other organizations to access your [place] medical records  [id number]        Your Vitals Were     Pulse Temperature Respirations BMI (Body Mass Index)          64 98.5  F (36.9  C) 16 26.01 kg/m2         Blood Pressure from Last 3 Encounters:   10/20/17 105/66   17 122/73   17 113/67    Weight from Last 3 Encounters:   10/20/17 128 lb 12.8 oz (58.4 kg)   17 126 lb 6.4 oz (57.3 kg)   17 128 lb 6.4 oz (58.2 kg)              Today, you had the following     No orders found for display       Primary Care Provider Office Phone # Fax #    R Nirmal Wade [phone]       [facility] [place] [address] [place], [place] [address]        Equal Access to Services     [facility] Hospital[facility] : Hadii aad ku hadasho Soomaali, waaxda luqadaha, qaybta kaalmada adeegyada, juanita robertson . So [facility] [phone].    ATENCIÓN: Si habla español, tiene a norton disposición servicios gratuitos de asistencia lingüística. LlLima [facility] [phone].    We comply with applicable federal civil rights laws and [place] laws. We do not discriminate on the basis of race, color, national origin, age, disability, sex, sexual orientation, or gender identity.            Thank you!     Thank you for choosing [facility]  for your care. Our goal is always to provide you with excellent care. Hearing back from our patients is one way we can continue to improve our services. Please take a few minutes to complete the written survey that you may receive in the mail after your visit with us. Thank you!             Your Updated Medication List - Protect others around you: Learn how to safely use, store and throw away your  medicines at www.disposemymeds.org.          This list is accurate as of: 10/17/17 11:59 PM.  Always use your most recent med list.                   Brand Name Dispense Instructions for use Diagnosis    ARTIFICIAL TEARS OP      Apply to eye 4 times daily        ASPIRIN ADULT LOW STRENGTH 81 MG EC tablet   Generic drug:  aspirin     90 tablet    TAKE 1 TABLET BY MOUTH DAILY    HTN (hypertension)       calcium polycarbophil 625 MG tablet    FIBERCON     Take 1 tablet by mouth daily        ipratropium 0.03 % spray    ATROVENT    30 mL    SPRAYS 2 SPRAYS INTO BOTH NOSTRILS EVERY 12 HOURS    Seasonal allergic rhinitis       levothyroxine 75 MCG tablet    SYNTHROID/LEVOTHROID    30 tablet    TAKE 1 TABLET BY MOUTH DAILY    Hypothyroidism       metoprolol 100 MG 24 hr tablet    TOPROL-XL    30 tablet    TAKE ONE TABLET BY MOUTH EVERY DAY    HTN (hypertension)       polyethylene glycol powder    MIRALAX/GLYCOLAX    255 g    TAKE BY MOUTH 17G IN 8OZ WATER/JUICE DAILY    Constipation, unspecified constipation type       sennosides 8.6 MG tablet    SENOKOT     Take 1 tablet by mouth 2 times daily        TRAMADOL HCL PO      Take 25-50 mg by mouth every 6 hours as needed for moderate to severe pain (1-2 every 6 hours as needed)        UNABLE TO FIND      Colostomy wafer change 2 times weekly on Tuesday and Friday        UNABLE TO FIND      MEDICATION NAME: LACRILUBE OPHTHALMIC 1/2 INCH THREAD DAILY        ZETIA 10 MG tablet   Generic drug:  ezetimibe     30 tablet    Take 1 tablet (10 mg) by mouth daily    Pure hypercholesterolemia

## 2017-10-20 VITALS
TEMPERATURE: 98.5 F | DIASTOLIC BLOOD PRESSURE: 66 MMHG | WEIGHT: 128.8 LBS | SYSTOLIC BLOOD PRESSURE: 105 MMHG | BODY MASS INDEX: 26.01 KG/M2 | HEART RATE: 64 BPM | RESPIRATION RATE: 16 BRPM

## 2017-11-03 NOTE — PROGRESS NOTES
HISTORY OF PRESENT ILLNESS:  Estela is a 89 year old female (10/17/1925)  resident of [facility] who is being seen today for a routine 30 day follow up. . Patient offers no other complaint.  Staff notes no other issues.    TSH was ordered last month, due for repeat in January along with BMP and lipids.    Current medications, allergies, and interdisciplinary care plan are reviewed.      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 (H) 01/01/2011     Priority: Medium     Kyphosis 01/01/2011     Priority: Medium     Primary osteoarthritis involving multiple joints 01/01/2011     Priority: Medium     Advanced care planning/counseling discussion 07/07/2010     Priority: Medium     Benign essential hypertension 08/14/2006     Priority: Medium     Hypothyroidism 12/11/2000     Priority: Medium          Social History     Social History     Marital status:      Spouse name: N/A     Number of children: N/A     Years of education: N/A     Occupational History      Homemaker     retired     Social History Main Topics     Smoking status: Former Smoker     Smokeless tobacco: Never Used      Comment: tried to quit yes, no passive exposure     Alcohol use No     Drug use: No     Sexual activity: Not on file     Other Topics Concern     Caffeine Concern Yes     one cup daily     Parent/Sibling W/ Cabg, Mi Or Angioplasty Before 65f 55m? No     Social History Narrative    Lives at [facility]         Current Outpatient Prescriptions   Medication Sig     calcium polycarbophil (FIBERCON) 625 MG tablet Take 1 tablet by mouth daily     UNABLE TO FIND MEDICATION NAME: LACRILUBE OPHTHALMIC 1/2 INCH THREAD DAILY     polyethylene glycol (MIRALAX/GLYCOLAX) powder TAKE BY MOUTH 17G IN 8OZ WATER/JUICE DAILY     TRAMADOL HCL PO Take 25-50 mg by mouth every 6 hours as needed for moderate to severe pain (1-2 every 6 hours as needed)      ZETIA 10 MG tablet Take 1 tablet (10 mg) by mouth daily     sennosides (SENOKOT) 8.6 MG tablet Take 1 tablet by mouth 2 times daily     ipratropium (ATROVENT) 0.03 % nasal spray SPRAYS 2 SPRAYS INTO BOTH NOSTRILS EVERY 12 HOURS     ASPIRIN ADULT LOW STRENGTH 81 MG EC tablet TAKE 1 TABLET BY MOUTH DAILY     levothyroxine (SYNTHROID, LEVOTHROID) 75 MCG tablet TAKE 1 TABLET BY MOUTH DAILY     metoprolol (TOPROL-XL) 100 MG 24 hr tablet TAKE ONE TABLET BY MOUTH EVERY DAY     Hypromellose (ARTIFICIAL TEARS OP) Apply to eye 4 times daily     UNABLE TO FIND Colostomy wafer change 2 times weekly on Tuesday and Friday     No current facility-administered medications for this visit.      Facility-Administered Medications Ordered in Other Visits   Medication     lidocaine 1% with EPINEPHrine 1:100,000 injection       Allergies   Allergen Reactions     Atorvastatin Calcium Other (See Comments)     Increased liver function test  Lipitor         I have reviewed the care plan and do agree with the plan. and The facility is keeping track and record of current immunizations and preventive care.        ROS:  No chest pain, shortness of breath, fever, chills, headache, nausea, vomiting, dysuria, or changes in bowel habits.  Appetite is stable.  no pain noted.          OBJECTIVE:  /66  Pulse 64  Temp 98.5  F (36.9  C)  Resp 16  Wt 128 lb 12.8 oz (58.4 kg)  BMI 26.01 kg/m2    GENERAL:  Chronically ill appearing, alert, and in no acute distress  RESP:  Lungs clear.  No rales, rhonchi, or  wheezing  CV:  RRR.  S1 S2 normal  SKIN:  Age-related changes.  No suspicious lesions or rashes.  PSYCH:  Mentation normal, affect normal.  EXTREM:  No edema.           Lab/Diagnostic data:    TSH, BMP and lipids are ordered for January      ASSESSMENT/PLAN:    1. Other specified hypothyroidism  TSH ordered as above    2. Dementia without behavioral disturbance, unspecified dementia type  Chronic, continue current plan    3. Benign essential hypertension  Chronic, stable    4. Chronic systolic congestive heart failure (H)  Chronic, stable      Above issues stable.  No changes in current medications or care plan.      Total time spent with patient visit was 25 min including patient visit, review of pertinent clinical information, and treatment plan.      Taylor Barber NP

## 2017-11-13 ENCOUNTER — MEDICAL CORRESPONDENCE (OUTPATIENT)
Dept: HEALTH INFORMATION MANAGEMENT | Facility: HOSPITAL | Age: 82
End: 2017-11-13

## 2017-11-13 ENCOUNTER — NURSING HOME VISIT (OUTPATIENT)
Dept: FAMILY MEDICINE | Facility: OTHER | Age: 82
End: 2017-11-13

## 2017-11-13 VITALS
WEIGHT: 129 LBS | TEMPERATURE: 98 F | DIASTOLIC BLOOD PRESSURE: 62 MMHG | SYSTOLIC BLOOD PRESSURE: 101 MMHG | OXYGEN SATURATION: 94 % | BODY MASS INDEX: 26.05 KG/M2 | RESPIRATION RATE: 20 BRPM

## 2017-11-13 DIAGNOSIS — Z53.9 ERRONEOUS ENCOUNTER--DISREGARD: Primary | ICD-10-CM

## 2017-11-13 NOTE — MR AVS SNAPSHOT
"              After Visit Summary   2017    Estela Barber    MRN: [id number]           Patient Information     Date Of Birth          10/17/1925        Visit Information        Provider Department      2017 4:30 PM Pao Chau MD [facility]        Today's Diagnoses     ERRONEOUS ENCOUNTER--DISREGARD    -  1       Follow-ups after your visit        Who to contact     If you have questions or need follow up information about today's clinic visit or your schedule please contact [facility] directly at [phone].  Normal or non-critical lab and imaging results will be communicated to you by Netcontinuumhart, letter or phone within 4 business days after the clinic has received the results. If you do not hear from us within 7 days, please contact the clinic through [employer] or phone. If you have a critical or abnormal lab result, we will notify you by phone as soon as possible.  Submit refill requests through [employer] or call your pharmacy and they will forward the refill request to us. Please allow 3 business days for your refill to be completed.          Additional Information About Your Visit        MyChart Information     [employer] lets you send messages to your doctor, view your test results, renew your prescriptions, schedule appointments and more. To sign up, go to [email or web address] . Click on \"Log in\" on the left side of the screen, which will take you to the Welcome page. Then click on \"Sign up Now\" on the right side of the page.     You will be asked to enter the access code listed below, as well as some personal information. Please follow the directions to create your username and password.     Your access code is: [email or web address]  Expires: 2017  7:09 AM     Your access code will  in 90 days. If you need help or a new code, please call your [facility] or [phone].        Care EveryWhere ID     This is your Care EveryWhere ID. This could be used by " other organizations to access your [place] medical records  [id number]         Blood Pressure from Last 3 Encounters:   10/20/17 105/66   09/12/17 122/73   08/29/17 113/67    Weight from Last 3 Encounters:   10/20/17 128 lb 12.8 oz (58.4 kg)   09/12/17 126 lb 6.4 oz (57.3 kg)   08/29/17 128 lb 6.4 oz (58.2 kg)              Today, you had the following     No orders found for display       Primary Care Provider Office Phone # Fax #    R Nirmal Wade [phone]       [facility] [place] [address] [place] [address]  [place] [address]        Equal Access to Services     SHIRLEY ALGUNA : Hadii gertrude kirk hadasho Soomaali, waaxda luqadaha, qaybta kaalmada adeegyada, juanita alcantara. So [facility] [phone].    ATENCIÓN: Si habla español, tiene a norton disposición servicios gratuitos de asistencia lingüística. LlOhioHealth [facility] [phone].    We comply with applicable federal civil rights laws and [place] laws. We do not discriminate on the basis of race, color, national origin, age, disability, sex, sexual orientation, or gender identity.            Thank you!     Thank you for choosing [facility]  for your care. Our goal is always to provide you with excellent care. Hearing back from our patients is one way we can continue to improve our services. Please take a few minutes to complete the written survey that you may receive in the mail after your visit with us. Thank you!             Your Updated Medication List - Protect others around you: Learn how to safely use, store and throw away your medicines at www.disposemymeds.org.          This list is accurate as of: 11/13/17 11:59 PM.  Always use your most recent med list.                   Brand Name Dispense Instructions for use Diagnosis    ARTIFICIAL TEARS OP      Apply to eye 4 times daily        ASPIRIN ADULT LOW STRENGTH 81 MG EC tablet   Generic drug:  aspirin     90 tablet    TAKE 1 TABLET BY MOUTH DAILY    HTN  (hypertension)       calcium polycarbophil 625 MG tablet    FIBERCON     Take 1 tablet by mouth daily        ipratropium 0.03 % spray    ATROVENT    30 mL    SPRAYS 2 SPRAYS INTO BOTH NOSTRILS EVERY 12 HOURS    Seasonal allergic rhinitis       levothyroxine 75 MCG tablet    SYNTHROID/LEVOTHROID    30 tablet    TAKE 1 TABLET BY MOUTH DAILY    Hypothyroidism       metoprolol 100 MG 24 hr tablet    TOPROL-XL    30 tablet    TAKE ONE TABLET BY MOUTH EVERY DAY    HTN (hypertension)       polyethylene glycol powder    MIRALAX/GLYCOLAX    255 g    TAKE BY MOUTH 17G IN 8OZ WATER/JUICE DAILY    Constipation, unspecified constipation type       sennosides 8.6 MG tablet    SENOKOT     Take 1 tablet by mouth 2 times daily        TRAMADOL HCL PO      Take 25-50 mg by mouth every 6 hours as needed for moderate to severe pain (1-2 every 6 hours as needed)        UNABLE TO FIND      Colostomy wafer change 2 times weekly on Tuesday and Friday        UNABLE TO FIND      MEDICATION NAME: LACRILUBE OPHTHALMIC 1/2 INCH THREAD DAILY        ZETIA 10 MG tablet   Generic drug:  ezetimibe     30 tablet    Take 1 tablet (10 mg) by mouth daily    Pure hypercholesterolemia

## 2017-11-15 ENCOUNTER — MEDICAL CORRESPONDENCE (OUTPATIENT)
Dept: HEALTH INFORMATION MANAGEMENT | Facility: HOSPITAL | Age: 82
End: 2017-11-15

## 2017-11-15 ENCOUNTER — NURSING HOME VISIT (OUTPATIENT)
Dept: FAMILY MEDICINE | Facility: OTHER | Age: 82
End: 2017-11-15
Payer: COMMERCIAL

## 2017-11-15 DIAGNOSIS — I50.22 CHRONIC SYSTOLIC CONGESTIVE HEART FAILURE (H): ICD-10-CM

## 2017-11-15 DIAGNOSIS — F03.90 DEMENTIA WITHOUT BEHAVIORAL DISTURBANCE, UNSPECIFIED DEMENTIA TYPE: Primary | ICD-10-CM

## 2017-11-15 PROCEDURE — 99308 SBSQ NF CARE LOW MDM 20: CPT | Performed by: FAMILY MEDICINE

## 2017-11-15 NOTE — Clinical Note
I see that you sent this patient a letter regarding care coordination.  She is a long term resident of [facility] in [place] now.

## 2017-11-15 NOTE — MR AVS SNAPSHOT
"              After Visit Summary   11/15/2017    Estela Barber    MRN: [id number]           Patient Information     Date Of Birth          10/17/1925        Visit Information        Provider Department      11/15/2017 4:31 PM Pao Chau MD [facility]        Today's Diagnoses     Dementia without behavioral disturbance, unspecified dementia type    -  1    Chronic systolic congestive heart failure (H)           Follow-ups after your visit        Follow-up notes from your care team     Return in about 4 weeks (around 2017).      Who to contact     If you have questions or need follow up information about today's clinic visit or your schedule please contact [facility] directly at [phone].  Normal or non-critical lab and imaging results will be communicated to you by MyChart, letter or phone within 4 business days after the clinic has received the results. If you do not hear from us within 7 days, please contact the clinic through MyChart or phone. If you have a critical or abnormal lab result, we will notify you by phone as soon as possible.  Submit refill requests through [employer] or call your pharmacy and they will forward the refill request to us. Please allow 3 business days for your refill to be completed.          Additional Information About Your Visit        MyChart Information     [employer] lets you send messages to your doctor, view your test results, renew your prescriptions, schedule appointments and more. To sign up, go to [email or web address] . Click on \"Log in\" on the left side of the screen, which will take you to the Welcome page. Then click on \"Sign up Now\" on the right side of the page.     You will be asked to enter the access code listed below, as well as some personal information. Please follow the directions to create your username and password.     Your access code is: [email or web address]  Expires: 2017  7:09 AM     Your access code will  " in 90 days. If you need help or a new code, please call your [place] clinic or [phone].        Care EveryWhere ID     This is your Care EveryWhere ID. This could be used by other organizations to access your [place] medical records  [id number]        Your Vitals Were     Temperature Respirations Pulse Oximetry BMI (Body Mass Index)          98  F (36.7  C) 20 94% 26.05 kg/m2         Blood Pressure from Last 3 Encounters:   11/13/17 101/62   10/20/17 105/66   09/12/17 122/73    Weight from Last 3 Encounters:   11/13/17 129 lb (58.5 kg)   10/20/17 128 lb 12.8 oz (58.4 kg)   09/12/17 126 lb 6.4 oz (57.3 kg)              Today, you had the following     No orders found for display       Primary Care Provider Office Phone # Fax #    R Nirmal Wade [phone]       [facility] [place] [address] [place] [address]        Equal Access to Services     SKYLER LAGUNA : Hadii aad ku hadasho Soomaali, waaxda luqadaha, qaybta kaalmada adeegyada, waxay idiin hayaan madhu robertson . So [facility] [phone].    ATENCIÓN: Si habla español, tiene a norton disposición servicios gratuitos de asistencia lingüística. Llame al [phone].    We comply with applicable federal civil rights laws and [place] laws. We do not discriminate on the basis of race, color, national origin, age, disability, sex, sexual orientation, or gender identity.            Thank you!     Thank you for choosing [facility]  for your care. Our goal is always to provide you with excellent care. Hearing back from our patients is one way we can continue to improve our services. Please take a few minutes to complete the written survey that you may receive in the mail after your visit with us. Thank you!             Your Updated Medication List - Protect others around you: Learn how to safely use, store and throw away your medicines at www.disposemymeds.org.          This list is accurate as of: 11/15/17 11:06  AM.  Always use your most recent med list.                   Brand Name Dispense Instructions for use Diagnosis    ARTIFICIAL TEARS OP      Apply to eye 4 times daily        ASPIRIN ADULT LOW STRENGTH 81 MG EC tablet   Generic drug:  aspirin     90 tablet    TAKE 1 TABLET BY MOUTH DAILY    HTN (hypertension)       calcium polycarbophil 625 MG tablet    FIBERCON     Take 1 tablet by mouth daily        ipratropium 0.03 % spray    ATROVENT    30 mL    SPRAYS 2 SPRAYS INTO BOTH NOSTRILS EVERY 12 HOURS    Seasonal allergic rhinitis       levothyroxine 75 MCG tablet    SYNTHROID/LEVOTHROID    30 tablet    TAKE 1 TABLET BY MOUTH DAILY    Hypothyroidism       metoprolol 100 MG 24 hr tablet    TOPROL-XL    30 tablet    TAKE ONE TABLET BY MOUTH EVERY DAY    HTN (hypertension)       polyethylene glycol powder    MIRALAX/GLYCOLAX    255 g    TAKE BY MOUTH 17G IN 8OZ WATER/JUICE DAILY    Constipation, unspecified constipation type       sennosides 8.6 MG tablet    SENOKOT     Take 1 tablet by mouth 2 times daily        TRAMADOL HCL PO      Take 25-50 mg by mouth every 6 hours as needed for moderate to severe pain (1-2 every 6 hours as needed)        UNABLE TO FIND      Colostomy wafer change 2 times weekly on Tuesday and Friday        UNABLE TO FIND      MEDICATION NAME: LACRILUBE OPHTHALMIC 1/2 INCH THREAD DAILY        ZETIA 10 MG tablet   Generic drug:  ezetimibe     30 tablet    Take 1 tablet (10 mg) by mouth daily    Pure hypercholesterolemia

## 2017-11-15 NOTE — PROGRESS NOTES
HISTORY OF PRESENT ILLNESS:  Estela is a 89 year old female (10/17/1925)  resident of [facility] who is being seen today for a routine 30 day follow up. Patient offers no other complaint.  Staff notes no other issues.    Current medications, allergies, and interdisciplinary care plan are reviewed.      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 (H) 01/01/2011     Priority: Medium     Kyphosis 01/01/2011     Priority: Medium     Primary osteoarthritis involving multiple joints 01/01/2011     Priority: Medium     Advanced care planning/counseling discussion 07/07/2010     Priority: Medium     Benign essential hypertension 08/14/2006     Priority: Medium     Hypothyroidism 12/11/2000     Priority: Medium          Social History     Social History     Marital status:      Spouse name: N/A     Number of children: N/A     Years of education: N/A     Occupational History      Homemaker     retired     Social History Main Topics     Smoking status: Former Smoker     Smokeless tobacco: Never Used      Comment: tried to quit yes, no passive exposure     Alcohol use No     Drug use: No     Sexual activity: Not on file     Other Topics Concern     Caffeine Concern Yes     one cup daily     Parent/Sibling W/ Cabg, Mi Or Angioplasty Before 65f 55m? No     Social History Narrative    Lives at [facility]        Current Outpatient Prescriptions   Medication Sig     calcium polycarbophil (FIBERCON)  625 MG tablet Take 1 tablet by mouth daily     UNABLE TO FIND MEDICATION NAME: LACRILUBE OPHTHALMIC 1/2 INCH THREAD DAILY     polyethylene glycol (MIRALAX/GLYCOLAX) powder TAKE BY MOUTH 17G IN 8OZ WATER/JUICE DAILY     TRAMADOL HCL PO Take 25-50 mg by mouth every 6 hours as needed for moderate to severe pain (1-2 every 6 hours as needed)      ZETIA 10 MG tablet Take 1 tablet (10 mg) by mouth daily     sennosides (SENOKOT) 8.6 MG tablet Take 1 tablet by mouth 2 times daily     ipratropium (ATROVENT) 0.03 % nasal spray SPRAYS 2 SPRAYS INTO BOTH NOSTRILS EVERY 12 HOURS     ASPIRIN ADULT LOW STRENGTH 81 MG EC tablet TAKE 1 TABLET BY MOUTH DAILY     levothyroxine (SYNTHROID, LEVOTHROID) 75 MCG tablet TAKE 1 TABLET BY MOUTH DAILY     metoprolol (TOPROL-XL) 100 MG 24 hr tablet TAKE ONE TABLET BY MOUTH EVERY DAY     Hypromellose (ARTIFICIAL TEARS OP) Apply to eye 4 times daily     UNABLE TO FIND Colostomy wafer change 2 times weekly on Tuesday and Friday     No current facility-administered medications for this visit.      Facility-Administered Medications Ordered in Other Visits   Medication     lidocaine 1% with EPINEPHrine 1:100,000 injection       Allergies   Allergen Reactions     Atorvastatin Calcium Other (See Comments)     Increased liver function test  Lipitor         I have reviewed the care plan and do agree with the plan.      ROS:  No chest pain, shortness of breath, fever, chills, headache, nausea, vomiting, dysuria, or changes in bowel habits.  Appetite is good.  No pain noted.          OBJECTIVE:  /62  Temp 98  F (36.7  C)  Resp 20  Wt 129 lb (58.5 kg)  SpO2 94%  BMI 26.05 kg/m2    GENERAL:  Chronically ill appearing, alert, and in no acute distress  RESP:  Lungs clear.  No rales, rhonchi, or wheezing  CV:  RRR.  S1 S2 without murmur. No clicks or rubs.  SKIN:  Age-related changes.  No suspicious lesions or rashes.  PSYCH:  Mentation normal, affect normal.  EXTREM:  No  edema.          Lab/Diagnostic data:    Due in January      ASSESSMENT/PLAN:  1. Dementia without behavioral disturbance, unspecified dementia type  No changes to current care plan.    2. Chronic systolic congestive heart failure (H)  As above.        Above issues stable.  No changes in current medications or care plan.      Total time spent with patient visit was 25 min including patient visit, review of pertinent clinical information, and treatment plan.      Pao Chau MD

## 2017-12-08 ENCOUNTER — MEDICAL CORRESPONDENCE (OUTPATIENT)
Dept: HEALTH INFORMATION MANAGEMENT | Facility: HOSPITAL | Age: 82
End: 2017-12-08

## 2017-12-13 DIAGNOSIS — G89.4 CHRONIC PAIN SYNDROME: Primary | ICD-10-CM

## 2017-12-13 NOTE — TELEPHONE ENCOUNTER
tramadol      Last Written Prescription Date: 5/9/17  Last Fill Quantity: 15,  # refills: 0   Last Office Visit with G, P or [facility] prescribing provider: 11/15/17

## 2017-12-14 RX ORDER — TRAMADOL HYDROCHLORIDE 50 MG/1
TABLET ORAL
Qty: 30 TABLET | Refills: 0 | Status: SHIPPED | OUTPATIENT
Start: 2017-12-14 | End: 2017-12-22

## 2017-12-19 ENCOUNTER — NURSING HOME VISIT (OUTPATIENT)
Dept: FAMILY MEDICINE | Facility: OTHER | Age: 82
End: 2017-12-19
Payer: COMMERCIAL

## 2017-12-19 DIAGNOSIS — M15.0 PRIMARY OSTEOARTHRITIS INVOLVING MULTIPLE JOINTS: ICD-10-CM

## 2017-12-19 DIAGNOSIS — F03.90 DEMENTIA WITHOUT BEHAVIORAL DISTURBANCE, UNSPECIFIED DEMENTIA TYPE: ICD-10-CM

## 2017-12-19 DIAGNOSIS — I50.22 CHRONIC SYSTOLIC CONGESTIVE HEART FAILURE (H): ICD-10-CM

## 2017-12-19 DIAGNOSIS — I10 BENIGN ESSENTIAL HYPERTENSION: Primary | ICD-10-CM

## 2017-12-19 DIAGNOSIS — E03.8 OTHER SPECIFIED HYPOTHYROIDISM: ICD-10-CM

## 2017-12-19 PROCEDURE — 99308 SBSQ NF CARE LOW MDM 20: CPT | Performed by: NURSE PRACTITIONER

## 2017-12-19 NOTE — MR AVS SNAPSHOT
"              After Visit Summary   12/19/2017    Estela Barber    MRN: [id number]           Patient Information     Date Of Birth          10/17/1925        Visit Information        Provider Department      12/19/2017 8:41 AM Taylor Barber NP [facility]        Today's Diagnoses     Benign essential hypertension    -  1    Chronic systolic congestive heart failure (H)        Dementia without behavioral disturbance, unspecified dementia type        Primary osteoarthritis involving multiple joints        Other specified hypothyroidism           Follow-ups after your visit        Follow-up notes from your care team     Return in about 4 weeks (around 1/16/2018).      Who to contact     If you have questions or need follow up information about today's clinic visit or your schedule please contact [facility] directly at [phone].  Normal or non-critical lab and imaging results will be communicated to you by MyChart, letter or phone within 4 business days after the clinic has received the results. If you do not hear from us within 7 days, please contact the clinic through MyChart or phone. If you have a critical or abnormal lab result, we will notify you by phone as soon as possible.  Submit refill requests through [employer] or call your pharmacy and they will forward the refill request to us. Please allow 3 business days for your refill to be completed.          Additional Information About Your Visit        MyChart Information     [employer] lets you send messages to your doctor, view your test results, renew your prescriptions, schedule appointments and more. To sign up, go to [email or web address] . Click on \"Log in\" on the left side of the screen, which will take you to the Welcome page. Then click on \"Sign up Now\" on the right side of the page.     You will be asked to enter the access code listed below, as well as some personal information. Please follow the directions to " create your username and password.     Your access code is: [email or web address]  Expires: 2018  7:49 PM     Your access code will  in 90 days. If you need help or a new code, please call your [facility] or [phone].        Care EveryWhere ID     This is your Care EveryWhere ID. This could be used by other organizations to access your [place] medical records  [id number]        Your Vitals Were     Pulse Temperature Respirations Pulse Oximetry BMI (Body Mass Index)       66 97.9  F (36.6  C) 18 95% 25.57 kg/m2        Blood Pressure from Last 3 Encounters:   17 136/68   17 101/62   10/20/17 105/66    Weight from Last 3 Encounters:   17 126 lb 9.6 oz (57.4 kg)   17 129 lb (58.5 kg)   10/20/17 128 lb 12.8 oz (58.4 kg)              Today, you had the following     No orders found for display       Primary Care Provider Office Phone # Fax #    R Nirmal Wade [phone]       [facility] [place] [address] [place], [place] [id number]        Equal Access to Services     SHIRLEY LAGUNA AH: Hadii aad ku hadasho Soomaali, waaxda luqadaha, qaybta kaalmada adeegyada, waxay ayshain hayligian madhu alcantara. So [facility] [phone].    ATENCIÓN: Si habla español, tiene a norton disposición servicios gratuitos de asistencia lingüística. Llame al [phone].    We comply with applicable federal civil rights laws and [place] laws. We do not discriminate on the basis of race, color, national origin, age, disability, sex, sexual orientation, or gender identity.            Thank you!     Thank you for choosing [facility]  for your care. Our goal is always to provide you with excellent care. Hearing back from our patients is one way we can continue to improve our services. Please take a few minutes to complete the written survey that you may receive in the mail after your visit with us. Thank you!             Your Updated Medication List - Protect others around you:  Learn how to safely use, store and throw away your medicines at www.disposemymeds.org.          This list is accurate as of: 12/19/17 11:59 PM.  Always use your most recent med list.                   Brand Name Dispense Instructions for use Diagnosis    ARTIFICIAL TEARS OP      Apply to eye 4 times daily        ASPIRIN PO      Take 81 mg by mouth daily        calcium polycarbophil 625 MG tablet    FIBERCON     Take 1 tablet by mouth daily        ipratropium 0.03 % spray    ATROVENT    30 mL    SPRAYS 2 SPRAYS INTO BOTH NOSTRILS EVERY 12 HOURS    Seasonal allergic rhinitis       levothyroxine 75 MCG tablet    SYNTHROID/LEVOTHROID    30 tablet    TAKE 1 TABLET BY MOUTH DAILY    Hypothyroidism       LOPRESSOR 50 MG tablet   Generic drug:  metoprolol     60 tablet    Take 1 tablet (50 mg) by mouth 2 times daily    Benign essential hypertension       polyethylene glycol powder    MIRALAX/GLYCOLAX    255 g    TAKE BY MOUTH 17G IN 8OZ WATER/JUICE DAILY    Constipation, unspecified constipation type       sennosides 8.6 MG tablet    SENOKOT     Take 1 tablet by mouth 2 times daily        TRAMADOL HCL PO      Take 25-50 mg by mouth every 6 hours as needed for moderate to severe pain (1-2 every 6 hours as needed)        UNABLE TO FIND      Colostomy wafer change 2 times weekly on Tuesday and Friday        UNABLE TO FIND      MEDICATION NAME: LACRILUBE OPHTHALMIC 1/2 INCH THREAD DAILY        ZETIA 10 MG tablet   Generic drug:  ezetimibe     30 tablet    Take 1 tablet (10 mg) by mouth daily    Pure hypercholesterolemia

## 2017-12-22 ENCOUNTER — NURSING HOME VISIT (OUTPATIENT)
Dept: FAMILY MEDICINE | Facility: OTHER | Age: 82
End: 2017-12-22
Payer: COMMERCIAL

## 2017-12-22 VITALS
RESPIRATION RATE: 18 BRPM | SYSTOLIC BLOOD PRESSURE: 136 MMHG | HEART RATE: 66 BPM | DIASTOLIC BLOOD PRESSURE: 68 MMHG | TEMPERATURE: 97.9 F | OXYGEN SATURATION: 95 % | BODY MASS INDEX: 25.57 KG/M2 | WEIGHT: 126.6 LBS

## 2017-12-22 DIAGNOSIS — Z53.9 ERRONEOUS ENCOUNTER--DISREGARD: Primary | ICD-10-CM

## 2017-12-22 NOTE — MR AVS SNAPSHOT
"              After Visit Summary   2017    Estela Barber    MRN: [id number]           Patient Information     Date Of Birth          10/17/1925        Visit Information        Provider Department      2017 8:40 AM Taylor Barber NP [facility]        Today's Diagnoses     ERRONEOUS ENCOUNTER--DISREGARD    -  1       Follow-ups after your visit        Who to contact     If you have questions or need follow up information about today's clinic visit or your schedule please contact [facility] directly at [phone].  Normal or non-critical lab and imaging results will be communicated to you by [employer], letter or phone within 4 business days after the clinic has received the results. If you do not hear from us within 7 days, please contact the clinic through [employer] or phone. If you have a critical or abnormal lab result, we will notify you by phone as soon as possible.  Submit refill requests through [employer] or call your pharmacy and they will forward the refill request to us. Please allow 3 business days for your refill to be completed.          Additional Information About Your Visit        MyChart Information     [employer] lets you send messages to your doctor, view your test results, renew your prescriptions, schedule appointments and more. To sign up, go to [email or web address] . Click on \"Log in\" on the left side of the screen, which will take you to the Welcome page. Then click on \"Sign up Now\" on the right side of the page.     You will be asked to enter the access code listed below, as well as some personal information. Please follow the directions to create your username and password.     Your access code is: [email or web address]  Expires: 2018  7:49 PM     Your access code will  in 90 days. If you need help or a new code, please call your [facility] or [phone].        Care EveryWhere ID     This is your Care EveryWhere ID. This could be used " by other organizations to access your [place] medical records  [id number]         Blood Pressure from Last 3 Encounters:   12/12/17 136/68   11/13/17 101/62   10/20/17 105/66    Weight from Last 3 Encounters:   12/12/17 126 lb 9.6 oz (57.4 kg)   11/13/17 129 lb (58.5 kg)   10/20/17 128 lb 12.8 oz (58.4 kg)              Today, you had the following     No orders found for display       Primary Care Provider Office Phone # Fax #    R Nirmal Wade [phone]       [facility] [place] [address] [facility]        Equal Access to Services     SHIRLEY LAGUNA : Hadii gertrude lathamo Sodarryn, waaxda luqadaha, qaybta kaalmada adeegyada, juanita alcantara. So [facility] [phone].    ATENCIÓN: Si habla español, tiene a norton disposición servicios gratuitos de asistencia lingüística. Llame al [phone].    We comply with applicable federal civil rights laws and [place] laws. We do not discriminate on the basis of race, color, national origin, age, disability, sex, sexual orientation, or gender identity.            Thank you!     Thank you for choosing [facility]  for your care. Our goal is always to provide you with excellent care. Hearing back from our patients is one way we can continue to improve our services. Please take a few minutes to complete the written survey that you may receive in the mail after your visit with us. Thank you!             Your Updated Medication List - Protect others around you: Learn how to safely use, store and throw away your medicines at www.disposemymeds.org.          This list is accurate as of: 12/22/17 11:59 PM.  Always use your most recent med list.                   Brand Name Dispense Instructions for use Diagnosis    ARTIFICIAL TEARS OP      Apply to eye 4 times daily        ASPIRIN PO      Take 81 mg by mouth daily        calcium polycarbophil 625 MG tablet    FIBERCON     Take 1 tablet by mouth daily         ipratropium 0.03 % spray    ATROVENT    30 mL    SPRAYS 2 SPRAYS INTO BOTH NOSTRILS EVERY 12 HOURS    Seasonal allergic rhinitis       levothyroxine 75 MCG tablet    SYNTHROID/LEVOTHROID    30 tablet    TAKE 1 TABLET BY MOUTH DAILY    Hypothyroidism       LOPRESSOR 50 MG tablet   Generic drug:  metoprolol     60 tablet    Take 1 tablet (50 mg) by mouth 2 times daily    Benign essential hypertension       polyethylene glycol powder    MIRALAX/GLYCOLAX    255 g    TAKE BY MOUTH 17G IN 8OZ WATER/JUICE DAILY    Constipation, unspecified constipation type       sennosides 8.6 MG tablet    SENOKOT     Take 1 tablet by mouth 2 times daily        TRAMADOL HCL PO      Take 25-50 mg by mouth every 6 hours as needed for moderate to severe pain (1-2 every 6 hours as needed)        UNABLE TO FIND      Colostomy wafer change 2 times weekly on Tuesday and Friday        UNABLE TO FIND      MEDICATION NAME: LACRILUBE OPHTHALMIC 1/2 INCH THREAD DAILY        ZETIA 10 MG tablet   Generic drug:  ezetimibe     30 tablet    Take 1 tablet (10 mg) by mouth daily    Pure hypercholesterolemia

## 2018-01-07 RX ORDER — METOPROLOL TARTRATE 50 MG
50 TABLET ORAL 2 TIMES DAILY
Qty: 60 TABLET | Refills: 11 | COMMUNITY
Start: 2018-01-07 | End: 2018-02-23

## 2018-01-08 NOTE — PROGRESS NOTES
HISTORY OF PRESENT ILLNESS:  Estela is a 89 year old female (10/17/1925)  resident of [facility] who is being seen today for a routine 30 day follow up. Patient offers no other complaint.  Staff notes no other issues.    Current medications, allergies, and interdisciplinary care plan are reviewed.      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 (H) 01/01/2011     Priority: Medium     Kyphosis 01/01/2011     Priority: Medium     Primary osteoarthritis involving multiple joints 01/01/2011     Priority: Medium     Advanced care planning/counseling discussion 07/07/2010     Priority: Medium     Benign essential hypertension 08/14/2006     Priority: Medium     Hypothyroidism 12/11/2000     Priority: Medium          Social History     Social History     Marital status:      Spouse name: N/A     Number of children: N/A     Years of education: N/A     Occupational History      Homemaker     retired     Social History Main Topics     Smoking status: Former Smoker     Smokeless tobacco: Never Used      Comment: tried to quit yes, no passive exposure     Alcohol use No     Drug use: No     Sexual activity: Not on file     Other Topics Concern     Caffeine Concern Yes     one cup daily     Parent/Sibling W/ Cabg, Mi Or Angioplasty Before 65f 55m? No     Social History Narrative    Lives at [facility]        Current Outpatient Prescriptions   Medication Sig     ASPIRIN PO Take 81 mg by mouth daily      Metoprolol Tartrate (LOPRESSOR PO) Take 50 mg by mouth 2 times daily     calcium polycarbophil (FIBERCON) 625 MG tablet Take 1 tablet by mouth daily     UNABLE TO FIND MEDICATION NAME: LACRILUBE OPHTHALMIC 1/2 INCH THREAD DAILY     polyethylene glycol (MIRALAX/GLYCOLAX) powder TAKE BY MOUTH 17G IN 8OZ WATER/JUICE DAILY     TRAMADOL HCL PO Take 25-50 mg by mouth every 6 hours as needed for moderate to severe pain (1-2 every 6 hours as needed)      ZETIA 10 MG tablet Take 1 tablet (10 mg) by mouth daily     sennosides (SENOKOT) 8.6 MG tablet Take 1 tablet by mouth 2 times daily     ipratropium (ATROVENT) 0.03 % nasal spray SPRAYS 2 SPRAYS INTO BOTH NOSTRILS EVERY 12 HOURS     levothyroxine (SYNTHROID, LEVOTHROID) 75 MCG tablet TAKE 1 TABLET BY MOUTH DAILY     Hypromellose (ARTIFICIAL TEARS OP) Apply to eye 4 times daily     UNABLE TO FIND Colostomy wafer change 2 times weekly on Tuesday and Friday     No current facility-administered medications for this visit.      Facility-Administered Medications Ordered in Other Visits   Medication     lidocaine 1% with EPINEPHrine 1:100,000 injection       Allergies   Allergen Reactions     Atorvastatin Calcium Other (See Comments)     Increased liver function test  Lipitor         I have reviewed the care plan and do agree with the plan.      ROS:  No chest pain, shortness of breath, fever, chills, headache, nausea, vomiting, dysuria, or changes in bowel habits.  Appetite is stable.  denies pain noted.          OBJECTIVE:  /68  Pulse 66  Temp 97.9  F (36.6  C)  Resp 18  Wt 126 lb 9.6 oz (57.4 kg)  SpO2 95%  BMI 25.57 kg/m2    GENERAL:  Chronically ill appearing, alert, and in no acute distress  RESP:  Lungs clear.  No rales, rhonchi, or wheezing  CV:  RRR.  S1 S2 with out murmur. No clicks or rubs.  SKIN:  Age-related changes.  No suspicious lesions or rashes.  PSYCH:  Mentation stable, affect bright  EXTREM:  trace edema.  Pulses intact.          Lab/Diagnostic  data:        ASSESSMENT/PLAN:  1. Benign essential hypertension    2. Chronic systolic congestive heart failure (H)    3. Dementia without behavioral disturbance, unspecified dementia type    4. Primary osteoarthritis involving multiple joints    5. Other specified hypothyroidism      Above issues stable.  No changes in current medications or care plan.      Total time spent with patient visit was 25 min including patient visit, review of pertinent clinical information, and treatment plan.      Taylor Barber NP

## 2018-01-09 ENCOUNTER — TRANSFERRED RECORDS (OUTPATIENT)
Dept: HEALTH INFORMATION MANAGEMENT | Facility: HOSPITAL | Age: 83
End: 2018-01-09

## 2018-01-09 ENCOUNTER — MEDICAL CORRESPONDENCE (OUTPATIENT)
Dept: HEALTH INFORMATION MANAGEMENT | Facility: HOSPITAL | Age: 83
End: 2018-01-09

## 2018-01-09 LAB
CHOLEST SERPL-MCNC: 193 MG/DL (ref 114–200)
CREAT SERPL-MCNC: 0.86 MG/DL (ref 0.4–1)
GLUCOSE SERPL-MCNC: 74 MG/DL (ref 70–100)
HDLC SERPL-MCNC: 50 MG/DL (ref 40–60)
LDLC SERPL CALC-MCNC: 106 MG/DL
POTASSIUM SERPL-SCNC: 4.5 MEQ/L (ref 3.4–5.1)
TRIGL SERPL-MCNC: 186 MG/DL (ref 10–200)
TSH SERPL-ACNC: 0.35 UIU/ML (ref 0.4–3.99)

## 2018-01-23 VITALS
OXYGEN SATURATION: 94 % | WEIGHT: 129 LBS | SYSTOLIC BLOOD PRESSURE: 110 MMHG | HEART RATE: 58 BPM | TEMPERATURE: 98.1 F | BODY MASS INDEX: 26.05 KG/M2 | RESPIRATION RATE: 20 BRPM | DIASTOLIC BLOOD PRESSURE: 58 MMHG

## 2018-01-23 RX ORDER — HYDROCORTISONE CREAM 1% 10 MG/G
CREAM TOPICAL EVERY 8 HOURS PRN
COMMUNITY

## 2018-01-24 ENCOUNTER — MEDICAL CORRESPONDENCE (OUTPATIENT)
Dept: HEALTH INFORMATION MANAGEMENT | Facility: HOSPITAL | Age: 83
End: 2018-01-24

## 2018-01-24 ENCOUNTER — NURSING HOME VISIT (OUTPATIENT)
Dept: FAMILY MEDICINE | Facility: OTHER | Age: 83
End: 2018-01-24
Payer: COMMERCIAL

## 2018-01-24 DIAGNOSIS — E03.8 OTHER SPECIFIED HYPOTHYROIDISM: ICD-10-CM

## 2018-01-24 DIAGNOSIS — F03.90 DEMENTIA WITHOUT BEHAVIORAL DISTURBANCE, UNSPECIFIED DEMENTIA TYPE: Primary | ICD-10-CM

## 2018-01-24 DIAGNOSIS — E78.00 PURE HYPERCHOLESTEROLEMIA: ICD-10-CM

## 2018-01-24 DIAGNOSIS — I10 BENIGN ESSENTIAL HYPERTENSION: ICD-10-CM

## 2018-01-24 PROCEDURE — 99308 SBSQ NF CARE LOW MDM 20: CPT | Performed by: FAMILY MEDICINE

## 2018-01-24 NOTE — MR AVS SNAPSHOT
"              After Visit Summary   1/24/2018    Estela Barber    MRN: [id number]           Patient Information     Date Of Birth          10/17/1925        Visit Information        Provider Department      1/24/2018 9:18 AM Pao Chau MD [facility]        Today's Diagnoses     Dementia without behavioral disturbance, unspecified dementia type    -  1    Benign essential hypertension        Pure hypercholesterolemia        Other specified hypothyroidism           Follow-ups after your visit        Follow-up notes from your care team     Return in about 4 weeks (around 2/21/2018).      Who to contact     If you have questions or need follow up information about today's clinic visit or your schedule please contact [facility] directly at [phone].  Normal or non-critical lab and imaging results will be communicated to you by MyChart, letter or phone within 4 business days after the clinic has received the results. If you do not hear from us within 7 days, please contact the clinic through MyChart or phone. If you have a critical or abnormal lab result, we will notify you by phone as soon as possible.  Submit refill requests through 5to1 or call your pharmacy and they will forward the refill request to us. Please allow 3 business days for your refill to be completed.          Additional Information About Your Visit        MyChart Information     5to1 lets you send messages to your doctor, view your test results, renew your prescriptions, schedule appointments and more. To sign up, go to [email or web address] . Click on \"Log in\" on the left side of the screen, which will take you to the Welcome page. Then click on \"Sign up Now\" on the right side of the page.     You will be asked to enter the access code listed below, as well as some personal information. Please follow the directions to create your username and password.     Your access code is: [email or web address]  Expires: " 2018  7:49 PM     Your access code will  in 90 days. If you need help or a new code, please call your [facility] or [phone].        Care EveryWhere ID     This is your Care EveryWhere ID. This could be used by other organizations to access your [place] medical records  [id number]        Your Vitals Were     Pulse Temperature Respirations Pulse Oximetry BMI (Body Mass Index)       58 98.1  F (36.7  C) 20 94% 26.05 kg/m2        Blood Pressure from Last 3 Encounters:   18 110/58   17 136/68   17 101/62    Weight from Last 3 Encounters:   18 129 lb (58.5 kg)   17 126 lb 9.6 oz (57.4 kg)   17 129 lb (58.5 kg)              Today, you had the following     No orders found for display       Primary Care Provider Office Phone # Fax #    R Nirmal Wade [phone]       [facility] [place] [address]        Equal Access to Services     [facility] Hospital[facility] : Hadii aad ku hadasho Soomaali, waaxda luqadaha, qaybta kaalmada madhuyalesley, juanita robertson . So [facility] [phone].    ATENCIÓN: Si habla español, tiene a norton disposición servicios gratuitos de asistencia lingüística. [facility] [phone].    We comply with applicable federal civil rights laws and [place] laws. We do not discriminate on the basis of race, color, national origin, age, disability, sex, sexual orientation, or gender identity.            Thank you!     Thank you for choosing [facility]  for your care. Our goal is always to provide you with excellent care. Hearing back from our patients is one way we can continue to improve our services. Please take a few minutes to complete the written survey that you may receive in the mail after your visit with us. Thank you!             Your Updated Medication List - Protect others around you: Learn how to safely use, store and throw away your medicines at www.disposemymeds.org.           This list is accurate as of 1/24/18 10:48 AM.  Always use your most recent med list.                   Brand Name Dispense Instructions for use Diagnosis    ARTIFICIAL TEARS OP      Apply to eye 4 times daily        ASPIRIN PO      Take 81 mg by mouth daily        calcium polycarbophil 625 MG tablet    FIBERCON     Take 1 tablet by mouth daily        Hydrocortisone Acetate 1 % Oint      Externally apply topically 2 times daily        ipratropium 0.03 % spray    ATROVENT    30 mL    SPRAYS 2 SPRAYS INTO BOTH NOSTRILS EVERY 12 HOURS    Seasonal allergic rhinitis       levothyroxine 75 MCG tablet    SYNTHROID/LEVOTHROID    30 tablet    TAKE 1 TABLET BY MOUTH DAILY    Hypothyroidism       LOPRESSOR 50 MG tablet   Generic drug:  metoprolol tartrate     60 tablet    Take 1 tablet (50 mg) by mouth 2 times daily    Benign essential hypertension       polyethylene glycol powder    MIRALAX/GLYCOLAX    255 g    TAKE BY MOUTH 17G IN 8OZ WATER/JUICE DAILY    Constipation, unspecified constipation type       sennosides 8.6 MG tablet    SENOKOT     Take 1 tablet by mouth 2 times daily        TAMIFLU PO      Take 30 mg by mouth At Bedtime        TRAMADOL HCL PO      Take 25-50 mg by mouth every 6 hours as needed for moderate to severe pain (1-2 every 6 hours as needed)        UNABLE TO FIND      Colostomy wafer change 2 times weekly on Tuesday and Friday        UNABLE TO FIND      MEDICATION NAME: LACRILUBE OPHTHALMIC 1/2 INCH THREAD DAILY        ZETIA 10 MG tablet   Generic drug:  ezetimibe     30 tablet    Take 1 tablet (10 mg) by mouth daily    Pure hypercholesterolemia

## 2018-01-24 NOTE — PROGRESS NOTES
HISTORY OF PRESENT ILLNESS:  Estela is a 89 year old female (10/17/1925)  resident of [facility] who is being seen today for a routine 30 day follow up. Patient offers no other complaint.  Staff notes no other issues.    Current medications, allergies, and interdisciplinary care plan are reviewed.      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 (H) 01/01/2011     Priority: Medium     Kyphosis 01/01/2011     Priority: Medium     Primary osteoarthritis involving multiple joints 01/01/2011     Priority: Medium     Advanced care planning/counseling discussion 07/07/2010     Priority: Medium     Benign essential hypertension 08/14/2006     Priority: Medium     Hypothyroidism 12/11/2000     Priority: Medium          Social History     Social History     Marital status:      Spouse name: N/A     Number of children: N/A     Years of education: N/A     Occupational History      Homemaker     retired     Social History Main Topics     Smoking status: Former Smoker     Smokeless tobacco: Never Used      Comment: tried to quit yes, no passive exposure     Alcohol use No     Drug use: No     Sexual activity: Not on file     Other Topics Concern     Caffeine Concern Yes     one cup daily     Parent/Sibling W/ Cabg, Mi Or Angioplasty Before 65f 55m? No     Social History Narrative    [facility]ives at [facility]        Current Outpatient Prescriptions   Medication Sig     Oseltamivir  Phosphate (TAMIFLU PO) Take 30 mg by mouth At Bedtime     Hydrocortisone Acetate 1 % OINT Externally apply topically 2 times daily     metoprolol (LOPRESSOR) 50 MG tablet Take 1 tablet (50 mg) by mouth 2 times daily     ASPIRIN PO Take 81 mg by mouth daily     calcium polycarbophil (FIBERCON) 625 MG tablet Take 1 tablet by mouth daily     UNABLE TO FIND MEDICATION NAME: LACRILUBE OPHTHALMIC 1/2 INCH THREAD DAILY     polyethylene glycol (MIRALAX/GLYCOLAX) powder TAKE BY MOUTH 17G IN 8OZ WATER/JUICE DAILY     TRAMADOL HCL PO Take 25-50 mg by mouth every 6 hours as needed for moderate to severe pain (1-2 every 6 hours as needed)      ZETIA 10 MG tablet Take 1 tablet (10 mg) by mouth daily     sennosides (SENOKOT) 8.6 MG tablet Take 1 tablet by mouth 2 times daily     ipratropium (ATROVENT) 0.03 % nasal spray SPRAYS 2 SPRAYS INTO BOTH NOSTRILS EVERY 12 HOURS     levothyroxine (SYNTHROID, LEVOTHROID) 75 MCG tablet TAKE 1 TABLET BY MOUTH DAILY     Hypromellose (ARTIFICIAL TEARS OP) Apply to eye 4 times daily     UNABLE TO FIND Colostomy wafer change 2 times weekly on Tuesday and Friday     No current facility-administered medications for this visit.      Facility-Administered Medications Ordered in Other Visits   Medication     lidocaine 1% with EPINEPHrine 1:100,000 injection       Allergies   Allergen Reactions     Atorvastatin Calcium Other (See Comments)     Increased liver function test  Lipitor         I have reviewed the care plan and do agree with the plan.      ROS:  No chest pain, shortness of breath, fever, chills, headache, nausea, vomiting, dysuria, or changes in bowel habits.  Appetite is good.  No pain noted.          OBJECTIVE:  /58  Pulse 58  Temp 98.1  F (36.7  C)  Resp 20  Wt 129 lb (58.5 kg)  SpO2 94%  BMI 26.05 kg/m2    GENERAL:  Chronically ill appearing, alert, and in no acute distress  RESP:  Lungs clear.  No rales, rhonchi, or wheezing  CV:  RRR.  S1 S2 without murmur. No clicks or  rubs.  SKIN:  Age-related changes.  No suspicious lesions or rashes.  PSYCH:  Mentation mildly confused, affect pleasant.  EXTREM:  Mild edema.      Flu vaccine given 10/2/17, Pneumovax and PCV 13 given.    Lab/Diagnostic data:    Completed early January  TSH 0.35  Glucose 74  Creatinine 0.86  Potassium 4.5  Lipids - total 193, , HDL 50,       ASSESSMENT/PLAN:  1. Dementia without behavioral disturbance, unspecified dementia type  No changes to current plan.    2. Benign essential hypertension  Repeat labs in July    3. Pure hypercholesterolemia  Repeat labs in July    4. Other specified hypothyroidism  Repeat labs in July       Above issues stable.  No changes in current medications or care plan.      Total time spent with patient visit was 25 min including patient visit, review of pertinent clinical information, and treatment plan.      Pao Chau MD

## 2018-02-20 ENCOUNTER — NURSING HOME VISIT (OUTPATIENT)
Dept: FAMILY MEDICINE | Facility: OTHER | Age: 83
End: 2018-02-20
Payer: COMMERCIAL

## 2018-02-20 DIAGNOSIS — I50.22 CHRONIC SYSTOLIC CONGESTIVE HEART FAILURE (H): ICD-10-CM

## 2018-02-20 DIAGNOSIS — E78.00 PURE HYPERCHOLESTEROLEMIA: ICD-10-CM

## 2018-02-20 DIAGNOSIS — E03.9 HYPOTHYROIDISM, UNSPECIFIED TYPE: ICD-10-CM

## 2018-02-20 DIAGNOSIS — F03.90 DEMENTIA WITHOUT BEHAVIORAL DISTURBANCE, UNSPECIFIED DEMENTIA TYPE: Primary | ICD-10-CM

## 2018-02-20 DIAGNOSIS — I10 BENIGN ESSENTIAL HYPERTENSION: ICD-10-CM

## 2018-02-20 PROCEDURE — 99308 SBSQ NF CARE LOW MDM 20: CPT | Performed by: NURSE PRACTITIONER

## 2018-02-20 NOTE — MR AVS SNAPSHOT
"              After Visit Summary   2/20/2018    Estela Barber    MRN: [id number]           Patient Information     Date Of Birth          10/17/1925        Visit Information        Provider Department      2/20/2018 9:48 AM Taylor Barber NP [facility]        Today's Diagnoses     Dementia without behavioral disturbance, unspecified dementia type    -  1    Hypothyroidism, unspecified type        Pure hypercholesterolemia        Benign essential hypertension        Chronic systolic congestive heart failure (H)           Follow-ups after your visit        Follow-up notes from your care team     Return in about 4 weeks (around 3/20/2018), or if symptoms worsen or fail to improve.      Who to contact     If you have questions or need follow up information about today's clinic visit or your schedule please contact [facility] directly at [phone].  Normal or non-critical lab and imaging results will be communicated to you by MyChart, letter or phone within 4 business days after the clinic has received the results. If you do not hear from us within 7 days, please contact the clinic through MyChart or phone. If you have a critical or abnormal lab result, we will notify you by phone as soon as possible.  Submit refill requests through Go Vocab or call your pharmacy and they will forward the refill request to us. Please allow 3 business days for your refill to be completed.          Additional Information About Your Visit        MyChart Information     Go Vocab lets you send messages to your doctor, view your test results, renew your prescriptions, schedule appointments and more. To sign up, go to [email or web address] . Click on \"Log in\" on the left side of the screen, which will take you to the Welcome page. Then click on \"Sign up Now\" on the right side of the page.     You will be asked to enter the access code listed below, as well as some personal information. Please follow " the directions to create your username and password.     Your access code is: [email or web address]  Expires: 2018  7:49 PM     Your access code will  in 90 days. If you need help or a new code, please call your [facility] or [phone].        Care EveryWhere ID     This is your Care EveryWhere ID. This could be used by other organizations to access your [place] medical records  [id number]        Your Vitals Were     Pulse Temperature Respirations Pulse Oximetry BMI (Body Mass Index)       60 97.8  F (36.6  C) 18 94% 26.48 kg/m2        Blood Pressure from Last 3 Encounters:   18 120/64   18 110/58   17 136/68    Weight from Last 3 Encounters:   18 131 lb 1.6 oz (59.5 kg)   18 129 lb (58.5 kg)   17 126 lb 9.6 oz (57.4 kg)              Today, you had the following     No orders found for display       Primary Care Provider Office Phone # Fax #    R Nirmal Wade [phone]       [address] [place] [address]        Equal Access to Services     SHIRLEY LAGUNA AH: Hadii gertrude lathamo Sodarryn, waaxda luqadaha, qaybta kaalmada adeegyada, juanita alcantara. So [facility] [phone].    ATENCIÓN: Si habla español, tiene a nroton disposición servicios gratuitos de asistencia lingüística. Llame al [phone].    We comply with applicable federal civil rights laws and [place] laws. We do not discriminate on the basis of race, color, national origin, age, disability, sex, sexual orientation, or gender identity.            Thank you!     Thank you for choosing [facility]  for your care. Our goal is always to provide you with excellent care. Hearing back from our patients is one way we can continue to improve our services. Please take a few minutes to complete the written survey that you may receive in the mail after your visit with us. Thank you!             Your Updated Medication List - Protect others around you: Learn how  to safely use, store and throw away your medicines at www.disposemymeds.org.          This list is accurate as of 2/20/18 11:59 PM.  Always use your most recent med list.                   Brand Name Dispense Instructions for use Diagnosis    ARTIFICIAL TEARS OP      Apply to eye 4 times daily        ASPIRIN PO      Take 81 mg by mouth daily        calcium polycarbophil 625 MG tablet    FIBERCON     Take 1 tablet by mouth daily        Hydrocortisone Acetate 1 % Oint      Externally apply topically 2 times daily        ipratropium 0.03 % spray    ATROVENT    30 mL    SPRAYS 2 SPRAYS INTO BOTH NOSTRILS EVERY 12 HOURS    Seasonal allergic rhinitis       levothyroxine 75 MCG tablet    SYNTHROID/LEVOTHROID    30 tablet    TAKE 1 TABLET BY MOUTH DAILY    Hypothyroidism       polyethylene glycol powder    MIRALAX/GLYCOLAX    255 g    TAKE BY MOUTH 17G IN 8OZ WATER/JUICE DAILY    Constipation, unspecified constipation type       sennosides 8.6 MG tablet    SENOKOT     Take 1 tablet by mouth 2 times daily        TRAMADOL HCL PO      Take 25-50 mg by mouth every 6 hours as needed for moderate to severe pain (1-2 every 6 hours as needed)        UNABLE TO FIND      Colostomy wafer change 2 times weekly on Tuesday and Friday        UNABLE TO FIND      MEDICATION NAME: LACRILUBE OPHTHALMIC 1/2 INCH THREAD DAILY        ZETIA 10 MG tablet   Generic drug:  ezetimibe     30 tablet    Take 1 tablet (10 mg) by mouth daily    Pure hypercholesterolemia

## 2018-02-23 ENCOUNTER — NURSING HOME VISIT (OUTPATIENT)
Dept: FAMILY MEDICINE | Facility: OTHER | Age: 83
End: 2018-02-23
Payer: COMMERCIAL

## 2018-02-23 VITALS
WEIGHT: 131.1 LBS | BODY MASS INDEX: 26.48 KG/M2 | TEMPERATURE: 97.8 F | DIASTOLIC BLOOD PRESSURE: 64 MMHG | RESPIRATION RATE: 18 BRPM | OXYGEN SATURATION: 94 % | HEART RATE: 60 BPM | SYSTOLIC BLOOD PRESSURE: 120 MMHG

## 2018-02-23 DIAGNOSIS — Z53.9 ERRONEOUS ENCOUNTER--DISREGARD: Primary | ICD-10-CM

## 2018-02-23 NOTE — MR AVS SNAPSHOT
"              After Visit Summary   2018    Estela Barber    MRN: [id number]           Patient Information     Date Of Birth          10/17/1925        Visit Information        Provider Department      2018 9:34 AM Taylor Barber NP [facility]        Today's Diagnoses     ERRONEOUS ENCOUNTER--DISREGARD    -  1       Follow-ups after your visit        Who to contact     If you have questions or need follow up information about today's clinic visit or your schedule please contact [facility] directly at [phone].  Normal or non-critical lab and imaging results will be communicated to you by Noknokerhart, letter or phone within 4 business days after the clinic has received the results. If you do not hear from us within 7 days, please contact the clinic through Noknokerhart or phone. If you have a critical or abnormal lab result, we will notify you by phone as soon as possible.  Submit refill requests through Nfoshare or call your pharmacy and they will forward the refill request to us. Please allow 3 business days for your refill to be completed.          Additional Information About Your Visit        MyChart Information     Nfoshare lets you send messages to your doctor, view your test results, renew your prescriptions, schedule appointments and more. To sign up, go to [email or web address] . Click on \"Log in\" on the left side of the screen, which will take you to the Welcome page. Then click on \"Sign up Now\" on the right side of the page.     You will be asked to enter the access code listed below, as well as some personal information. Please follow the directions to create your username and password.     Your access code is: [email or web address]  Expires: 2018  7:49 PM     Your access code will  in 90 days. If you need help or a new code, please call your [facility] or [phone].        Care EveryWhere ID     This is your Care EveryWhere ID. This could be used by " other organizations to access your [place] medical records  [id number]         Blood Pressure from Last 3 Encounters:   02/23/18 120/64   01/23/18 110/58   12/12/17 136/68    Weight from Last 3 Encounters:   02/23/18 131 lb 1.6 oz (59.5 kg)   01/23/18 129 lb (58.5 kg)   12/12/17 126 lb 9.6 oz (57.4 kg)              Today, you had the following     No orders found for display       Primary Care Provider Office Phone # Fax #    R Nirmal Wade [phone]       [facility] [place] [address]        Equal Access to Services     SKYLER LAGUNA : Humberto Aden, wayakelin parrish, janae kaalmada yuli, juanita alcantara. So [facility] [phone].    ATENCIÓN: Si habla español, tiene a norton disposición servicios gratuitos de asistencia lingüística. Llame al [phone].    We comply with applicable federal civil rights laws and [place] laws. We do not discriminate on the basis of race, color, national origin, age, disability, sex, sexual orientation, or gender identity.            Thank you!     Thank you for choosing [facility]  for your care. Our goal is always to provide you with excellent care. Hearing back from our patients is one way we can continue to improve our services. Please take a few minutes to complete the written survey that you may receive in the mail after your visit with us. Thank you!             Your Updated Medication List - Protect others around you: Learn how to safely use, store and throw away your medicines at www.disposemymeds.org.          This list is accurate as of 2/23/18 11:59 PM.  Always use your most recent med list.                   Brand Name Dispense Instructions for use Diagnosis    ARTIFICIAL TEARS OP      Apply to eye 4 times daily        ASPIRIN PO      Take 81 mg by mouth daily        calcium polycarbophil 625 MG tablet    FIBERCON     Take 1 tablet by mouth daily        Hydrocortisone Acetate 1 % Oint       Externally apply topically 2 times daily        ipratropium 0.03 % spray    ATROVENT    30 mL    SPRAYS 2 SPRAYS INTO BOTH NOSTRILS EVERY 12 HOURS    Seasonal allergic rhinitis       levothyroxine 75 MCG tablet    SYNTHROID/LEVOTHROID    30 tablet    TAKE 1 TABLET BY MOUTH DAILY    Hypothyroidism       polyethylene glycol powder    MIRALAX/GLYCOLAX    255 g    TAKE BY MOUTH 17G IN 8OZ WATER/JUICE DAILY    Constipation, unspecified constipation type       sennosides 8.6 MG tablet    SENOKOT     Take 1 tablet by mouth 2 times daily        TRAMADOL HCL PO      Take 25-50 mg by mouth every 6 hours as needed for moderate to severe pain (1-2 every 6 hours as needed)        UNABLE TO FIND      Colostomy wafer change 2 times weekly on Tuesday and Friday        UNABLE TO FIND      MEDICATION NAME: LACRILUBE OPHTHALMIC 1/2 INCH THREAD DAILY        ZETIA 10 MG tablet   Generic drug:  ezetimibe     30 tablet    Take 1 tablet (10 mg) by mouth daily    Pure hypercholesterolemia

## 2018-02-23 NOTE — PROGRESS NOTES
A user error has taken place: encounter opened in error, closed for administrative reasons  Pamela M Lechevalier LPN  .

## 2018-02-28 NOTE — PROGRESS NOTES
HISTORY OF PRESENT ILLNESS:  Estela is a 89 year old female (10/17/1925)  resident of [facility] who is being seen today for a routine 30 day follow up. . Patient offers no other complaint.  Staff notes no other issues.    Current medications, allergies, and interdisciplinary care plan are reviewed.      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 (H) 01/01/2011     Priority: Medium     Kyphosis 01/01/2011     Priority: Medium     Primary osteoarthritis involving multiple joints 01/01/2011     Priority: Medium     Advanced care planning/counseling discussion 07/07/2010     Priority: Medium     Benign essential hypertension 08/14/2006     Priority: Medium     Hypothyroidism 12/11/2000     Priority: Medium          Social History     Social History     Marital status:      Spouse name: N/A     Number of children: N/A     Years of education: N/A     Occupational History      Homemaker     retired     Social History Main Topics     Smoking status: Former Smoker     Smokeless tobacco: Never Used      Comment: tried to quit yes, no passive exposure     Alcohol use No     Drug use: No     Sexual activity: Not on file     Other Topics Concern     Caffeine Concern Yes     one cup daily     Parent/Sibling W/ Cabg, Mi Or Angioplasty Before 65f 55m? No     Social History Narrative    Lives at [facility]        Current Outpatient Prescriptions   Medication Sig     Hydrocortisone Acetate 1 % OINT  Externally apply topically 2 times daily     ASPIRIN PO Take 81 mg by mouth daily     calcium polycarbophil (FIBERCON) 625 MG tablet Take 1 tablet by mouth daily     UNABLE TO FIND MEDICATION NAME: LACRILUBE OPHTHALMIC 1/2 INCH THREAD DAILY     polyethylene glycol (MIRALAX/GLYCOLAX) powder TAKE BY MOUTH 17G IN 8OZ WATER/JUICE DAILY     TRAMADOL HCL PO Take 25-50 mg by mouth every 6 hours as needed for moderate to severe pain (1-2 every 6 hours as needed)      ZETIA 10 MG tablet Take 1 tablet (10 mg) by mouth daily     sennosides (SENOKOT) 8.6 MG tablet Take 1 tablet by mouth 2 times daily     ipratropium (ATROVENT) 0.03 % nasal spray SPRAYS 2 SPRAYS INTO BOTH NOSTRILS EVERY 12 HOURS     levothyroxine (SYNTHROID, LEVOTHROID) 75 MCG tablet TAKE 1 TABLET BY MOUTH DAILY     Hypromellose (ARTIFICIAL TEARS OP) Apply to eye 4 times daily     UNABLE TO FIND Colostomy wafer change 2 times weekly on Tuesday and Friday     No current facility-administered medications for this visit.      Facility-Administered Medications Ordered in Other Visits   Medication     lidocaine 1% with EPINEPHrine 1:100,000 injection       Allergies   Allergen Reactions     Atorvastatin Calcium Other (See Comments)     Increased liver function test  Lipitor         I have reviewed the care plan and do agree with the plan.      ROS:  No chest pain, shortness of breath, fever, chills, headache, nausea, vomiting, dysuria, or changes in bowel habits.  Appetite is good no pain noted.          OBJECTIVE:  /64  Pulse 60  Temp 97.8  F (36.6  C)  Resp 18  Wt 131 lb 1.6 oz (59.5 kg)  SpO2 94%  BMI 26.48 kg/m2    GENERAL:  Chronically ill appearing, alert, and in no acute distress  RESP:  Lungs clear.  No rales, rhonchi, or wheezing  CV:  RRR.  S1 S2 with out murmur. No clicks or rubs.  SKIN:  Age-related changes.  No suspicious lesions or rashes.  PSYCH:  Mentation mildly confused, affect pleasant  EXTREM:  mild edema          Lab/Diagnostic data:     Reviewed. Done in January.  Notes indicate to repeat in July.        ASSESSMENT/PLAN:  1. Dementia without behavioral disturbance, unspecified dementia type    2. Hypothyroidism, unspecified type    3. Pure hypercholesterolemia    4. Benign essential hypertension    5. Chronic systolic congestive heart failure (H)        Above issues stable.  No changes in current medications or care plan.      Total time spent with patient visit was 25 min including patient visit, review of pertinent clinical information, and treatment plan.      Taylor Barber NP

## 2018-03-20 VITALS
OXYGEN SATURATION: 94 % | SYSTOLIC BLOOD PRESSURE: 100 MMHG | DIASTOLIC BLOOD PRESSURE: 66 MMHG | HEART RATE: 64 BPM | RESPIRATION RATE: 20 BRPM | TEMPERATURE: 98.2 F

## 2018-03-21 ENCOUNTER — NURSING HOME VISIT (OUTPATIENT)
Dept: FAMILY MEDICINE | Facility: OTHER | Age: 83
End: 2018-03-21
Payer: COMMERCIAL

## 2018-03-21 DIAGNOSIS — F03.90 DEMENTIA WITHOUT BEHAVIORAL DISTURBANCE, UNSPECIFIED DEMENTIA TYPE: Primary | ICD-10-CM

## 2018-03-21 PROCEDURE — 99308 SBSQ NF CARE LOW MDM 20: CPT | Performed by: FAMILY MEDICINE

## 2018-03-21 NOTE — MR AVS SNAPSHOT
"              After Visit Summary   3/21/2018    Estela Barber    MRN: [id number]           Patient Information     Date Of Birth          10/17/1925        Visit Information        Provider Department      3/21/2018 8:41 AM Pao Chau MD [facility]        Today's Diagnoses     Dementia without behavioral disturbance, unspecified dementia type    -  1       Follow-ups after your visit        Follow-up notes from your care team     Return in about 4 weeks (around 2018).      Who to contact     If you have questions or need follow up information about today's clinic visit or your schedule please contact [facility] directly at [phone].  Normal or non-critical lab and imaging results will be communicated to you by MyChart, letter or phone within 4 business days after the clinic has received the results. If you do not hear from us within 7 days, please contact the clinic through MyChart or phone. If you have a critical or abnormal lab result, we will notify you by phone as soon as possible.  Submit refill requests through Plex or call your pharmacy and they will forward the refill request to us. Please allow 3 business days for your refill to be completed.          Additional Information About Your Visit        MyChart Information     Plex lets you send messages to your doctor, view your test results, renew your prescriptions, schedule appointments and more. To sign up, go to [email or web address] . Click on \"Log in\" on the left side of the screen, which will take you to the Welcome page. Then click on \"Sign up Now\" on the right side of the page.     You will be asked to enter the access code listed below, as well as some personal information. Please follow the directions to create your username and password.     Your access code is: [email or web address]  Expires: 2018  8:49 PM     Your access code will  in 90 days. If you need help or a new code, please call " your [facility] clinic or [phone].        Care EveryWhere ID     This is your Care EveryWhere ID. This could be used by other organizations to access your [facility] medical records  [id number]        Your Vitals Were     Pulse Temperature Respirations Pulse Oximetry          64 98.2  F (36.8  C) 20 94%         Blood Pressure from Last 3 Encounters:   03/20/18 100/66   02/23/18 120/64   01/23/18 110/58    Weight from Last 3 Encounters:   02/23/18 131 lb 1.6 oz (59.5 kg)   01/23/18 129 lb (58.5 kg)   12/12/17 126 lb 9.6 oz (57.4 kg)              Today, you had the following     No orders found for display       Primary Care Provider Office Phone # Fax #    R Nirmal Wade [phone]       [address] [place] [address]        Equal Access to Services     SKYLER LAGUNA : Hadii aad ku hadasho Soomaali, waaxda luqadaha, qaybta kaalmada adeegyada, waxay ayshain haypam robertson . So [facility] [phone].    ATENCIÓN: Si habla español, tiene a norton disposición servicios gratuitos de asistencia lingüística. Jerald al [phone].    We comply with applicable federal civil rights laws and [place] laws. We do not discriminate on the basis of race, color, national origin, age, disability, sex, sexual orientation, or gender identity.            Thank you!     Thank you for choosing [facility]  for your care. Our goal is always to provide you with excellent care. Hearing back from our patients is one way we can continue to improve our services. Please take a few minutes to complete the written survey that you may receive in the mail after your visit with us. Thank you!             Your Updated Medication List - Protect others around you: Learn how to safely use, store and throw away your medicines at www.disposemymeds.org.          This list is accurate as of 3/21/18 10:52 AM.  Always use your most recent med list.                   Brand Name Dispense Instructions for use  Diagnosis    ARTIFICIAL TEARS OP      Apply to eye 4 times daily        ASPIRIN PO      Take 81 mg by mouth daily        calcium polycarbophil 625 MG tablet    FIBERCON     Take 1 tablet by mouth daily        Hydrocortisone Acetate 1 % Oint      Externally apply topically 2 times daily        ipratropium 0.03 % spray    ATROVENT    30 mL    SPRAYS 2 SPRAYS INTO BOTH NOSTRILS EVERY 12 HOURS    Seasonal allergic rhinitis       levothyroxine 75 MCG tablet    SYNTHROID/LEVOTHROID    30 tablet    TAKE 1 TABLET BY MOUTH DAILY    Hypothyroidism       LOPRESSOR PO      Take 50 mg by mouth 2 times daily        polyethylene glycol powder    MIRALAX/GLYCOLAX    255 g    TAKE BY MOUTH 17G IN 8OZ WATER/JUICE DAILY    Constipation, unspecified constipation type       sennosides 8.6 MG tablet    SENOKOT     Take 1 tablet by mouth 2 times daily        TRAMADOL HCL PO      Take 25-50 mg by mouth every 6 hours as needed for moderate to severe pain (1-2 every 6 hours as needed)        UNABLE TO FIND      Colostomy wafer change 2 times weekly on Tuesday and Friday        UNABLE TO FIND      MEDICATION NAME: LACRILUBE OPHTHALMIC 1/2 INCH THREAD DAILY        ZETIA 10 MG tablet   Generic drug:  ezetimibe     30 tablet    Take 1 tablet (10 mg) by mouth daily    Pure hypercholesterolemia

## 2018-03-21 NOTE — PROGRESS NOTES
HISTORY OF PRESENT ILLNESS:  Estela is a 89 year old female (10/17/1925)  resident of [place] who is being seen today for a routine 30 day follow up. Patient has some mild congestion with mild cough, but findings are stable. Patient offers no other complaint.  Staff notes no other issues.    Current medications, allergies, and interdisciplinary care plan are reviewed.      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 (H) 01/01/2011     Priority: Medium     Kyphosis 01/01/2011     Priority: Medium     Primary osteoarthritis involving multiple joints 01/01/2011     Priority: Medium     Advanced care planning/counseling discussion 07/07/2010     Priority: Medium     Benign essential hypertension 08/14/2006     Priority: Medium     Hypothyroidism 12/11/2000     Priority: Medium          Social History     Social History     Marital status:      Spouse name: N/A     Number of children: N/A     Years of education: N/A     Occupational History      Homemaker     retired     Social History Main Topics     Smoking status: Former Smoker     Smokeless tobacco: Never Used      Comment: tried to quit yes, no passive exposure     Alcohol use No     Drug use: No     Sexual activity: Not on file     Other Topics Concern     Caffeine Concern Yes     one cup daily     Parent/Sibling W/ Cabg, Mi Or Angioplasty Before 65f 55m? No     Social History Narrative    Lives at [facility]        Current  Outpatient Prescriptions   Medication Sig     Metoprolol Tartrate (LOPRESSOR PO) Take 50 mg by mouth 2 times daily     Hydrocortisone Acetate 1 % OINT Externally apply topically 2 times daily     ASPIRIN PO Take 81 mg by mouth daily     calcium polycarbophil (FIBERCON) 625 MG tablet Take 1 tablet by mouth daily     UNABLE TO FIND MEDICATION NAME: LACRILUBE OPHTHALMIC 1/2 INCH THREAD DAILY     polyethylene glycol (MIRALAX/GLYCOLAX) powder TAKE BY MOUTH 17G IN 8OZ WATER/JUICE DAILY     TRAMADOL HCL PO Take 25-50 mg by mouth every 6 hours as needed for moderate to severe pain (1-2 every 6 hours as needed)      ZETIA 10 MG tablet Take 1 tablet (10 mg) by mouth daily     sennosides (SENOKOT) 8.6 MG tablet Take 1 tablet by mouth 2 times daily     ipratropium (ATROVENT) 0.03 % nasal spray SPRAYS 2 SPRAYS INTO BOTH NOSTRILS EVERY 12 HOURS     levothyroxine (SYNTHROID, LEVOTHROID) 75 MCG tablet TAKE 1 TABLET BY MOUTH DAILY     Hypromellose (ARTIFICIAL TEARS OP) Apply to eye 4 times daily     UNABLE TO FIND Colostomy wafer change 2 times weekly on Tuesday and Friday     No current facility-administered medications for this visit.      Facility-Administered Medications Ordered in Other Visits   Medication     lidocaine 1% with EPINEPHrine 1:100,000 injection       Allergies   Allergen Reactions     Atorvastatin Calcium Other (See Comments)     Increased liver function test  Lipitor         I have reviewed the care plan and do agree with the plan.      ROS:  No chest pain, shortness of breath, fever, chills, headache, nausea, vomiting, dysuria, or changes in bowel habits.  Appetite is good.  No pain noted.          OBJECTIVE:  /66  Pulse 64  Temp 98.2  F (36.8  C)  Resp 20  SpO2 94%    GENERAL:  Chronically ill appearing, alert, and in no acute distress  RESP:  Lungs clear.  No rales, rhonchi, or wheezing  CV:  RRR.  S1 S2 without murmur. No clicks or rubs.  SKIN:  Age-related changes.  No suspicious lesions or  rashes.  PSYCH:  Mentation mildly confused, affect pleasant.  EXTREM:  Mild edema, stockings on.          Lab/Diagnostic data:    Just completed in January, not due again until July      ASSESSMENT/PLAN:  1. Dementia without behavioral disturbance, unspecified dementia type  No changes to current care plan.        Above issues stable.  No changes in current medications or care plan.      Total time spent with patient visit was 25 min including patient visit, review of pertinent clinical information, and treatment plan.      Pao Chau MD

## 2018-04-17 ENCOUNTER — NURSING HOME VISIT (OUTPATIENT)
Dept: FAMILY MEDICINE | Facility: OTHER | Age: 83
End: 2018-04-17
Payer: COMMERCIAL

## 2018-04-17 DIAGNOSIS — F03.90 DEMENTIA WITHOUT BEHAVIORAL DISTURBANCE, UNSPECIFIED DEMENTIA TYPE: Primary | ICD-10-CM

## 2018-04-17 PROCEDURE — 99308 SBSQ NF CARE LOW MDM 20: CPT | Performed by: NURSE PRACTITIONER

## 2018-04-17 NOTE — MR AVS SNAPSHOT
"              After Visit Summary   2018    Estela Barber    MRN: [id number]           Patient Information     Date Of Birth          10/17/1925        Visit Information        Provider Department      2018 9:26 AM Taylor Barber NP [facility]        Today's Diagnoses     Dementia without behavioral disturbance, unspecified dementia type    -  1       Follow-ups after your visit        Who to contact     If you have questions or need follow up information about today's clinic visit or your schedule please contact [facility] directly at [phone].  Normal or non-critical lab and imaging results will be communicated to you by Hotlisthart, letter or phone within 4 business days after the clinic has received the results. If you do not hear from us within 7 days, please contact the clinic through Hotlisthart or phone. If you have a critical or abnormal lab result, we will notify you by phone as soon as possible.  Submit refill requests through Cybronics or call your pharmacy and they will forward the refill request to us. Please allow 3 business days for your refill to be completed.          Additional Information About Your Visit        MyChart Information     Cybronics lets you send messages to your doctor, view your test results, renew your prescriptions, schedule appointments and more. To sign up, go to [email or web address] . Click on \"Log in\" on the left side of the screen, which will take you to the Welcome page. Then click on \"Sign up Now\" on the right side of the page.     You will be asked to enter the access code listed below, as well as some personal information. Please follow the directions to create your username and password.     Your access code is: [email or web address]  Expires: 2018  1:18 PM     Your access code will  in 90 days. If you need help or a new code, please call your [facility] or [phone].        Care EveryWhere ID     This is your Care " EveryWhere ID. This could be used by other organizations to access your [facility] medical records  [id number]        Your Vitals Were     Pulse Temperature Respirations BMI (Body Mass Index)          61 97.8  F (36.6  C) (Tympanic) 20 25.73 kg/m2         Blood Pressure from Last 3 Encounters:   04/17/18 113/65   03/20/18 100/66   02/23/18 120/64    Weight from Last 3 Encounters:   04/17/18 127 lb 6.4 oz (57.8 kg)   02/23/18 131 lb 1.6 oz (59.5 kg)   01/23/18 129 lb (58.5 kg)              Today, you had the following     No orders found for display       Primary Care Provider Office Phone # Fax #    R Nirmal Wade [phone]       [facility] [address]        Equal Access to Services     SKYLER LAGUNA : Hadii gertrude choi Sodarryn, waaxda luqadaha, qaybta kaalmada adesaskiayalesley, juanita robertson . So [facility] [phone].    ATENCIÓN: Si habla español, tiene a norton disposición servicios gratuitos de asistencia lingüística. Llame al [phone].    We comply with applicable federal civil rights laws and [place] laws. We do not discriminate on the basis of race, color, national origin, age, disability, sex, sexual orientation, or gender identity.            Thank you!     Thank you for choosing [facility]  for your care. Our goal is always to provide you with excellent care. Hearing back from our patients is one way we can continue to improve our services. Please take a few minutes to complete the written survey that you may receive in the mail after your visit with us. Thank you!             Your Updated Medication List - Protect others around you: Learn how to safely use, store and throw away your medicines at www.disposemymeds.org.          This list is accurate as of 4/17/18 11:59 PM.  Always use your most recent med list.                   Brand Name Dispense Instructions for use Diagnosis    ARTIFICIAL TEARS OP      Apply to eye 4 times daily         ASPIRIN PO      Take 81 mg by mouth daily        calcium polycarbophil 625 MG tablet    FIBERCON     Take 1 tablet by mouth daily        Hydrocortisone Acetate 1 % Oint      Externally apply topically 2 times daily        ipratropium 0.03 % spray    ATROVENT    30 mL    SPRAYS 2 SPRAYS INTO BOTH NOSTRILS EVERY 12 HOURS    Seasonal allergic rhinitis       levothyroxine 75 MCG tablet    SYNTHROID/LEVOTHROID    30 tablet    TAKE 1 TABLET BY MOUTH DAILY    Hypothyroidism       LOPRESSOR PO      Take 50 mg by mouth 2 times daily        polyethylene glycol powder    MIRALAX/GLYCOLAX    255 g    TAKE BY MOUTH 17G IN 8OZ WATER/JUICE DAILY    Constipation, unspecified constipation type       sennosides 8.6 MG tablet    SENOKOT     Take 1 tablet by mouth 2 times daily        TRAMADOL HCL PO      Take 25-50 mg by mouth every 6 hours as needed for moderate to severe pain (1-2 every 6 hours as needed)        UNABLE TO FIND      Colostomy wafer change 2 times weekly on Tuesday and Friday        UNABLE TO FIND      MEDICATION NAME: LACRILUBE OPHTHALMIC 1/2 INCH THREAD DAILY        ZETIA 10 MG tablet   Generic drug:  ezetimibe     30 tablet    Take 1 tablet (10 mg) by mouth daily    Pure hypercholesterolemia

## 2018-04-18 VITALS
DIASTOLIC BLOOD PRESSURE: 65 MMHG | BODY MASS INDEX: 25.73 KG/M2 | WEIGHT: 127.4 LBS | RESPIRATION RATE: 20 BRPM | SYSTOLIC BLOOD PRESSURE: 113 MMHG | TEMPERATURE: 97.8 F | HEART RATE: 61 BPM

## 2018-04-23 NOTE — PROGRESS NOTES
HISTORY OF PRESENT ILLNESS:  Estela is a 89 year old female (10/17/1925)  resident of [facility] who is being seen today for a routine 30 day follow up.  Patient offers no other complaint.  Staff notes no other issues.    Current medications, allergies, and interdisciplinary care plan are reviewed.      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 (H) 01/01/2011     Priority: Medium     Kyphosis 01/01/2011     Priority: Medium     Primary osteoarthritis involving multiple joints 01/01/2011     Priority: Medium     Advanced care planning/counseling discussion 07/07/2010     Priority: Medium     Benign essential hypertension 08/14/2006     Priority: Medium     Hypothyroidism 12/11/2000     Priority: Medium          Social History     Social History     Marital status:      Spouse name: N/A     Number of children: N/A     Years of education: N/A     Occupational History      Homemaker     retired     Social History Main Topics     Smoking status: Former Smoker     Smokeless tobacco: Never Used      Comment: tried to quit yes, no passive exposure     Alcohol use No     Drug use: No     Sexual activity: Not on file     Other Topics Concern     Caffeine Concern Yes     one cup daily     Parent/Sibling W/ Cabg, Mi Or Angioplasty Before 65f 55m? No     Social History Narrative    L[facility] at [facility]        Current Outpatient Prescriptions   Medication Sig     ASPIRIN PO Take 81 mg by mouth  daily     calcium polycarbophil (FIBERCON) 625 MG tablet Take 1 tablet by mouth daily     Hydrocortisone Acetate 1 % OINT Externally apply topically 2 times daily     Hypromellose (ARTIFICIAL TEARS OP) Apply to eye 4 times daily     ipratropium (ATROVENT) 0.03 % nasal spray SPRAYS 2 SPRAYS INTO BOTH NOSTRILS EVERY 12 HOURS     levothyroxine (SYNTHROID, LEVOTHROID) 75 MCG tablet TAKE 1 TABLET BY MOUTH DAILY     Metoprolol Tartrate (LOPRESSOR PO) Take 50 mg by mouth 2 times daily     polyethylene glycol (MIRALAX/GLYCOLAX) powder TAKE BY MOUTH 17G IN 8OZ WATER/JUICE DAILY     sennosides (SENOKOT) 8.6 MG tablet Take 1 tablet by mouth 2 times daily     TRAMADOL HCL PO Take 25-50 mg by mouth every 6 hours as needed for moderate to severe pain (1-2 every 6 hours as needed)      UNABLE TO FIND Colostomy wafer change 2 times weekly on Tuesday and Friday     UNABLE TO FIND MEDICATION NAME: LACRILUBE OPHTHALMIC 1/2 INCH THREAD DAILY     ZETIA 10 MG tablet Take 1 tablet (10 mg) by mouth daily     No current facility-administered medications for this visit.      Facility-Administered Medications Ordered in Other Visits   Medication     lidocaine 1% with EPINEPHrine 1:100,000 injection       Allergies   Allergen Reactions     Atorvastatin Calcium Other (See Comments)     Increased liver function test  Lipitor         I have reviewed the care plan and do agree with the plan.      ROS:  No chest pain, shortness of breath, fever, chills, headache, nausea, vomiting, dysuria, or changes in bowel habits.  Appetite is stable.  No pain noted.          OBJECTIVE:  /65  Pulse 61  Temp 97.8  F (36.6  C) (Tympanic)  Resp 20  Wt 127 lb 6.4 oz (57.8 kg)  BMI 25.73 kg/m2    GENERAL:  Chronically ill appearing, alert, and in no acute distress  RESP:  Lungs clear.  No rales, rhonchi, or wheezing  CV:  RRR.  S1 S2 with out murmur. No clicks or rubs.  SKIN:  Age-related changes.  No suspicious lesions or rashes.  PSYCH:  Mentation mildly  confused, affect pleasant  EXTREM:  mild edema, compression stockings present          Lab/Diagnostic data:    Due in July      ASSESSMENT/PLAN:  1. Dementia without behavioral disturbance, unspecified dementia typ        Above issues stable.  No changes in current medications or care plan.      Total time spent with patient visit was 15 min including patient visit, review of pertinent clinical information, and treatment plan.      Taylor Barber NP

## 2018-05-21 ENCOUNTER — NURSING HOME VISIT (OUTPATIENT)
Dept: FAMILY MEDICINE | Facility: OTHER | Age: 83
End: 2018-05-21
Payer: COMMERCIAL

## 2018-05-21 VITALS
TEMPERATURE: 98 F | RESPIRATION RATE: 18 BRPM | HEART RATE: 62 BPM | WEIGHT: 128.6 LBS | BODY MASS INDEX: 25.97 KG/M2 | OXYGEN SATURATION: 94 % | SYSTOLIC BLOOD PRESSURE: 110 MMHG | DIASTOLIC BLOOD PRESSURE: 62 MMHG

## 2018-05-21 VITALS
HEART RATE: 62 BPM | DIASTOLIC BLOOD PRESSURE: 62 MMHG | WEIGHT: 128.6 LBS | TEMPERATURE: 98 F | OXYGEN SATURATION: 94 % | SYSTOLIC BLOOD PRESSURE: 110 MMHG | RESPIRATION RATE: 18 BRPM | BODY MASS INDEX: 25.97 KG/M2

## 2018-05-21 DIAGNOSIS — M15.0 PRIMARY OSTEOARTHRITIS INVOLVING MULTIPLE JOINTS: ICD-10-CM

## 2018-05-21 DIAGNOSIS — F03.90 DEMENTIA WITHOUT BEHAVIORAL DISTURBANCE, UNSPECIFIED DEMENTIA TYPE: Primary | ICD-10-CM

## 2018-05-21 PROCEDURE — 99308 SBSQ NF CARE LOW MDM 20: CPT | Performed by: FAMILY MEDICINE

## 2018-05-21 NOTE — MR AVS SNAPSHOT
"              After Visit Summary   2018    Estela Barber    MRN: [id number]           Patient Information     Date Of Birth          10/17/1925        Visit Information        Provider Department      2018 11:46 AM Pao Chau MD [facility]        Today's Diagnoses     Dementia without behavioral disturbance, unspecified dementia type    -  1    Primary osteoarthritis involving multiple joints           Follow-ups after your visit        Who to contact     If you have questions or need follow up information about today's clinic visit or your schedule please contact [facility] directly at [phone].  Normal or non-critical lab and imaging results will be communicated to you by Garmentoryhart, letter or phone within 4 business days after the clinic has received the results. If you do not hear from us within 7 days, please contact the clinic through Garmentoryhart or phone. If you have a critical or abnormal lab result, we will notify you by phone as soon as possible.  Submit refill requests through [employer] or call your pharmacy and they will forward the refill request to us. Please allow 3 business days for your refill to be completed.          Additional Information About Your Visit        MyChart Information     [employer] lets you send messages to your doctor, view your test results, renew your prescriptions, schedule appointments and more. To sign up, go to [email or web address] . Click on \"Log in\" on the left side of the screen, which will take you to the Welcome page. Then click on \"Sign up Now\" on the right side of the page.     You will be asked to enter the access code listed below, as well as some personal information. Please follow the directions to create your username and password.     Your access code is: [email or web address]  Expires: 2018  1:18 PM     Your access code will  in 90 days. If you need help or a new code, please call your [facility] or " [phone].        Care EveryWhere ID     This is your Care EveryWhere ID. This could be used by other organizations to access your [facility] medical records  [id number]        Your Vitals Were     Pulse Temperature Respirations Pulse Oximetry BMI (Body Mass Index)       62 98  F (36.7  C) 18 94% 25.97 kg/m2        Blood Pressure from Last 3 Encounters:   05/21/18 110/62   05/21/18 110/62   04/17/18 113/65    Weight from Last 3 Encounters:   05/21/18 128 lb 9.6 oz (58.3 kg)   05/21/18 128 lb 9.6 oz (58.3 kg)   04/17/18 127 lb 6.4 oz (57.8 kg)              Today, you had the following     No orders found for display       Primary Care Provider Office Phone # Fax #    R Nirmal Wade [phone]       [address] [place] [address]        Equal Access to Services     SHIRLEY LAGUNA : Hadii aad ku hadasho Soomaali, waaxda luqadaha, qaybta kaalmada adeegyada, juanita robertson . So [facility] [phone].    ATENCIÓN: Si habla español, tiene a norton disposición servicios gratuitos de asistencia lingüística. Llame al [phone].    We comply with applicable federal civil rights laws and [place] laws. We do not discriminate on the basis of race, color, national origin, age, disability, sex, sexual orientation, or gender identity.            Thank you!     Thank you for choosing [facility]  for your care. Our goal is always to provide you with excellent care. Hearing back from our patients is one way we can continue to improve our services. Please take a few minutes to complete the written survey that you may receive in the mail after your visit with us. Thank you!             Your Updated Medication List - Protect others around you: Learn how to safely use, store and throw away your medicines at www.disposemymeds.org.          This list is accurate as of 5/21/18 11:59 PM.  Always use your most recent med list.                   Brand Name Dispense Instructions for  use Diagnosis    ARTIFICIAL TEARS OP      Apply to eye 4 times daily        ASPIRIN PO      Take 81 mg by mouth daily        calcium polycarbophil 625 MG tablet    FIBERCON     Take 1 tablet by mouth daily        Hydrocortisone Acetate 1 % Oint      Externally apply topically 2 times daily        ipratropium 0.03 % spray    ATROVENT    30 mL    SPRAYS 2 SPRAYS INTO BOTH NOSTRILS EVERY 12 HOURS    Seasonal allergic rhinitis       levothyroxine 75 MCG tablet    SYNTHROID/LEVOTHROID    30 tablet    TAKE 1 TABLET BY MOUTH DAILY    Hypothyroidism       LOPRESSOR PO      Take 50 mg by mouth 2 times daily        polyethylene glycol powder    MIRALAX/GLYCOLAX    255 g    TAKE BY MOUTH 17G IN 8OZ WATER/JUICE DAILY    Constipation, unspecified constipation type       sennosides 8.6 MG tablet    SENOKOT     Take 1 tablet by mouth 2 times daily        TRAMADOL HCL PO      Take 25-50 mg by mouth every 6 hours as needed for moderate to severe pain (1-2 every 6 hours as needed)        UNABLE TO FIND      Colostomy wafer change 2 times weekly on Tuesday and Friday        UNABLE TO FIND      MEDICATION NAME: LACRILUBE OPHTHALMIC 1/2 INCH THREAD DAILY        ZETIA 10 MG tablet   Generic drug:  ezetimibe     30 tablet    Take 1 tablet (10 mg) by mouth daily    Pure hypercholesterolemia

## 2018-05-23 ENCOUNTER — NURSING HOME VISIT (OUTPATIENT)
Dept: FAMILY MEDICINE | Facility: OTHER | Age: 83
End: 2018-05-23
Payer: COMMERCIAL

## 2018-05-23 DIAGNOSIS — F03.90 DEMENTIA WITHOUT BEHAVIORAL DISTURBANCE, UNSPECIFIED DEMENTIA TYPE: Primary | ICD-10-CM

## 2018-05-23 NOTE — MR AVS SNAPSHOT
"              After Visit Summary   2018    Estela Barber    MRN: [id number]           Patient Information     Date Of Birth          10/17/1925        Visit Information        Provider Department      2018 11:54 AM Pao Chau MD [facility]        Today's Diagnoses     Dementia without behavioral disturbance, unspecified dementia type    -  1       Follow-ups after your visit        Who to contact     If you have questions or need follow up information about today's clinic visit or your schedule please contact [facility] directly at [phone].  Normal or non-critical lab and imaging results will be communicated to you by Ziptrhart, letter or phone within 4 business days after the clinic has received the results. If you do not hear from us within 7 days, please contact the clinic through Ziptrhart or phone. If you have a critical or abnormal lab result, we will notify you by phone as soon as possible.  Submit refill requests through "[employer] or call your pharmacy and they will forward the refill request to us. Please allow 3 business days for your refill to be completed.          Additional Information About Your Visit        MyChart Information     "[employer] lets you send messages to your doctor, view your test results, renew your prescriptions, schedule appointments and more. To sign up, go to [email or web address] . Click on \"Log in\" on the left side of the screen, which will take you to the Welcome page. Then click on \"Sign up Now\" on the right side of the page.     You will be asked to enter the access code listed below, as well as some personal information. Please follow the directions to create your username and password.     Your access code is: [email or web address]  Expires: 2018  1:18 PM     Your access code will  in 90 days. If you need help or a new code, please call your [facility] or [phone].        Care EveryWhere ID     This is your Care " EveryWhere ID. This could be used by other organizations to access your [place] [facility] medical records  [id number]        Your Vitals Were     Pulse Temperature Respirations Pulse Oximetry BMI (Body Mass Index)       62 98  F (36.7  C) 18 94% 25.97 kg/m2        Blood Pressure from Last 3 Encounters:   05/21/18 110/62   05/21/18 110/62   04/17/18 113/65    Weight from Last 3 Encounters:   05/21/18 128 lb 9.6 oz (58.3 kg)   05/21/18 128 lb 9.6 oz (58.3 kg)   04/17/18 127 lb 6.4 oz (57.8 kg)              Today, you had the following     No orders found for display       Primary Care Provider Office Phone # Fax #    R Nirmal Wade [phone]       [facility] [place] [address]        Equal Access to Services     [facility] campusDIETER : Hadii gertrude lathamo Sodarryn, waaxda luqadaha, qaybta kaalmada adesaskiayalesley, juanita robertson . So [facility] [phone].    ATENCIÓN: Si habla español, tiene a norton disposición servicios gratuitos de asistencia lingüística. Llame al [phone].    We comply with applicable federal civil rights laws and [place] laws. We do not discriminate on the basis of race, color, national origin, age, disability, sex, sexual orientation, or gender identity.            Thank you!     Thank you for choosing [facility]  for your care. Our goal is always to provide you with excellent care. Hearing back from our patients is one way we can continue to improve our services. Please take a few minutes to complete the written survey that you may receive in the mail after your visit with us. Thank you!             Your Updated Medication List - Protect others around you: Learn how to safely use, store and throw away your medicines at www.disposemymeds.org.          This list is accurate as of 5/23/18 11:59 PM.  Always use your most recent med list.                   Brand Name Dispense Instructions for use Diagnosis    ARTIFICIAL TEARS OP      Apply to eye 4  times daily        ASPIRIN PO      Take 81 mg by mouth daily        calcium polycarbophil 625 MG tablet    FIBERCON     Take 1 tablet by mouth daily        Hydrocortisone Acetate 1 % Oint      Externally apply topically 2 times daily        ipratropium 0.03 % spray    ATROVENT    30 mL    SPRAYS 2 SPRAYS INTO BOTH NOSTRILS EVERY 12 HOURS    Seasonal allergic rhinitis       levothyroxine 75 MCG tablet    SYNTHROID/LEVOTHROID    30 tablet    TAKE 1 TABLET BY MOUTH DAILY    Hypothyroidism       LOPRESSOR PO      Take 50 mg by mouth 2 times daily        polyethylene glycol powder    MIRALAX/GLYCOLAX    255 g    TAKE BY MOUTH 17G IN 8OZ WATER/JUICE DAILY    Constipation, unspecified constipation type       sennosides 8.6 MG tablet    SENOKOT     Take 1 tablet by mouth 2 times daily        TRAMADOL HCL PO      Take 25-50 mg by mouth every 6 hours as needed for moderate to severe pain (1-2 every 6 hours as needed)        UNABLE TO FIND      Colostomy wafer change 2 times weekly on Tuesday and Friday        UNABLE TO FIND      MEDICATION NAME: LACRILUBE OPHTHALMIC 1/2 INCH THREAD DAILY        ZETIA 10 MG tablet   Generic drug:  ezetimibe     30 tablet    Take 1 tablet (10 mg) by mouth daily    Pure hypercholesterolemia

## 2018-05-24 NOTE — PROGRESS NOTES
HISTORY OF PRESENT ILLNESS:  Estela is a 89 year old female (10/17/1925)  resident of [facility] who is being seen today for a routine 30 day follow up. Staff have noted that patient will complain of low back and hip pain while walking in therapy. Patient offers no other complaint.  Staff notes no other issues.    Current medications, allergies, and interdisciplinary care plan are reviewed.      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 (H) 01/01/2011     Priority: Medium     Kyphosis 01/01/2011     Priority: Medium     Primary osteoarthritis involving multiple joints 01/01/2011     Priority: Medium     Advanced care planning/counseling discussion 07/07/2010     Priority: Medium     Benign essential hypertension 08/14/2006     Priority: Medium     Hypothyroidism 12/11/2000     Priority: Medium          Social History     Social History     Marital status:      Spouse name: N/A     Number of children: N/A     Years of education: N/A     Occupational History      Homemaker     retired     Social History Main Topics     Smoking status: Former Smoker     Smokeless tobacco: Never Used      Comment: tried to quit yes, no passive exposure     Alcohol use No     Drug use: No     Sexual activity: Not on file     Other Topics Concern     Caffeine Concern Yes     one cup daily     Parent/Sibling W/ Cabg, Mi Or Angioplasty Before 65f 55m? No     Social History Narrative    Lives at  [facility]        Current Outpatient Prescriptions   Medication Sig     ASPIRIN PO Take 81 mg by mouth daily     calcium polycarbophil (FIBERCON) 625 MG tablet Take 1 tablet by mouth daily     Hydrocortisone Acetate 1 % OINT Externally apply topically 2 times daily     Hypromellose (ARTIFICIAL TEARS OP) Apply to eye 4 times daily     ipratropium (ATROVENT) 0.03 % nasal spray SPRAYS 2 SPRAYS INTO BOTH NOSTRILS EVERY 12 HOURS     levothyroxine (SYNTHROID, LEVOTHROID) 75 MCG tablet TAKE 1 TABLET BY MOUTH DAILY     Metoprolol Tartrate (LOPRESSOR PO) Take 50 mg by mouth 2 times daily     polyethylene glycol (MIRALAX/GLYCOLAX) powder TAKE BY MOUTH 17G IN 8OZ WATER/JUICE DAILY     sennosides (SENOKOT) 8.6 MG tablet Take 1 tablet by mouth 2 times daily     TRAMADOL HCL PO Take 25-50 mg by mouth every 6 hours as needed for moderate to severe pain (1-2 every 6 hours as needed)      UNABLE TO FIND MEDICATION NAME: LACRILUBE OPHTHALMIC 1/2 INCH THREAD DAILY     ZETIA 10 MG tablet Take 1 tablet (10 mg) by mouth daily     UNABLE TO FIND Colostomy wafer change 2 times weekly on Tuesday and Friday     No current facility-administered medications for this visit.      Facility-Administered Medications Ordered in Other Visits   Medication     lidocaine 1% with EPINEPHrine 1:100,000 injection       Allergies   Allergen Reactions     Atorvastatin Calcium Other (See Comments)     Increased liver function test  Lipitor         I have reviewed the care plan and do agree with the plan.      ROS:  No chest pain, shortness of breath, fever, chills, headache, nausea, vomiting, dysuria, or changes in bowel habits.  Appetite is good.  Back and hip pain noted.          OBJECTIVE:  /62  Pulse 62  Temp 98  F (36.7  C)  Resp 18  Wt 128 lb 9.6 oz (58.3 kg)  SpO2 94%  BMI 25.97 kg/m2    GENERAL:  Chronically ill appearing, alert, and in no acute distress  RESP:  Lungs clear.  No rales, rhonchi, or wheezing  CV:  RRR.  S1 S2 without  murmur. No clicks or rubs.  SKIN:  Age-related changes.  No suspicious lesions or rashes.  PSYCH:  Mentation mildly confused, affect pleasant.  EXTREM:  Mild edema.          Lab/Diagnostic data:    Due in July      ASSESSMENT/PLAN:  1. Dementia without behavioral disturbance, unspecified dementia type  No changes to care plan overall    2. Primary osteoarthritis involving multiple joints  Tylenol added to prn list with dose recommended before walking with therapy.         Above issues stable.  No changes in current medications or care plan.      Total time spent with patient visit was 25 min including patient visit, review of pertinent clinical information, and treatment plan.      Pao Chau MD

## 2018-05-30 DIAGNOSIS — K59.00 CONSTIPATION, UNSPECIFIED CONSTIPATION TYPE: Primary | ICD-10-CM

## 2018-06-01 RX ORDER — DOCUSATE SODIUM 50MG AND SENNOSIDES 8.6MG 8.6; 5 MG/1; MG/1
TABLET, FILM COATED ORAL
Qty: 100 TABLET | Refills: 11 | Status: SHIPPED | OUTPATIENT
Start: 2018-06-01

## 2018-06-01 NOTE — TELEPHONE ENCOUNTER
Veronica  Last office visit: 5/23/18 NH visit  Last refill: Historically noted.  No associated diagnosis.  Please advise.  Thank you.

## 2018-06-13 ENCOUNTER — NURSING HOME VISIT (OUTPATIENT)
Dept: FAMILY MEDICINE | Facility: OTHER | Age: 83
End: 2018-06-13
Payer: COMMERCIAL

## 2018-06-13 DIAGNOSIS — E03.9 HYPOTHYROIDISM, UNSPECIFIED TYPE: ICD-10-CM

## 2018-06-13 DIAGNOSIS — I50.22 CHRONIC SYSTOLIC CONGESTIVE HEART FAILURE (H): ICD-10-CM

## 2018-06-13 DIAGNOSIS — F03.90 DEMENTIA WITHOUT BEHAVIORAL DISTURBANCE, UNSPECIFIED DEMENTIA TYPE: ICD-10-CM

## 2018-06-13 DIAGNOSIS — I10 BENIGN ESSENTIAL HYPERTENSION: Primary | ICD-10-CM

## 2018-06-13 DIAGNOSIS — E78.00 PURE HYPERCHOLESTEROLEMIA: ICD-10-CM

## 2018-06-13 PROCEDURE — 99308 SBSQ NF CARE LOW MDM 20: CPT | Performed by: NURSE PRACTITIONER

## 2018-06-13 NOTE — MR AVS SNAPSHOT
"              After Visit Summary   6/13/2018    Estela Barber    MRN: [id number]           Patient Information     Date Of Birth          10/17/1925        Visit Information        Provider Department      6/13/2018 8:47 AM Taylor Barber NP [facility]        Today's Diagnoses     Benign essential hypertension    -  1    Chronic systolic congestive heart failure (H)        Hypothyroidism, unspecified type        Pure hypercholesterolemia        Dementia without behavioral disturbance, unspecified dementia type           Follow-ups after your visit        Who to contact     If you have questions or need follow up information about today's clinic visit or your schedule please contact [facility] directly at [phone].  Normal or non-critical lab and imaging results will be communicated to you by MyChart, letter or phone within 4 business days after the clinic has received the results. If you do not hear from us within 7 days, please contact the clinic through MyChart or phone. If you have a critical or abnormal lab result, we will notify you by phone as soon as possible.  Submit refill requests through [employer] or call your pharmacy and they will forward the refill request to us. Please allow 3 business days for your refill to be completed.          Additional Information About Your Visit        MyChart Information     [employer] lets you send messages to your doctor, view your test results, renew your prescriptions, schedule appointments and more. To sign up, go to [email or web address] . Click on \"Log in\" on the left side of the screen, which will take you to the Welcome page. Then click on \"Sign up Now\" on the right side of the page.     You will be asked to enter the access code listed below, as well as some personal information. Please follow the directions to create your username and password.     Your access code is: [email or web address]  Expires: 7/22/2018  1:18 PM     Your " access code will  in 90 days. If you need help or a new code, please call your [place] clinic or [phone].        Care EveryWhere ID     This is your Care EveryWhere ID. This could be used by other organizations to access your [place] medical records  [id number]        Your Vitals Were     Pulse Temperature Respirations Pulse Oximetry BMI (Body Mass Index)       64 97.6  F (36.4  C) 20 93% 26.7 kg/m2        Blood Pressure from Last 3 Encounters:   18 106/60   18 110/62   18 110/62    Weight from Last 3 Encounters:   18 132 lb 3.2 oz (60 kg)   18 128 lb 9.6 oz (58.3 kg)   18 128 lb 9.6 oz (58.3 kg)              Today, you had the following     No orders found for display       Primary Care Provider Office Phone # Fax #    R Nirmal Wade [phone]       [address] [place] [address]        Equal Access to Services     [facility]: Hadii aad ku hadasho Soomaali, waaxda luqadaha, qaybta kaalmada adeegyalesley, juanita robertson . So [facility] [phone].    ATENCIÓN: Si habla español, tiene a norton disposición servicios gratuitos de asistencia lingüística. Llame al [phone].    We comply with applicable federal civil rights laws and [place] laws. We do not discriminate on the basis of race, color, national origin, age, disability, sex, sexual orientation, or gender identity.            Thank you!     Thank you for choosing [facility]  for your care. Our goal is always to provide you with excellent care. Hearing back from our patients is one way we can continue to improve our services. Please take a few minutes to complete the written survey that you may receive in the mail after your visit with us. Thank you!             Your Updated Medication List - Protect others around you: Learn how to safely use, store and throw away your medicines at www.disposemymeds.org.          This list is accurate as of 18  11:59 PM.  Always use your most recent med list.                   Brand Name Dispense Instructions for use Diagnosis    ARTIFICIAL TEARS OP      Apply to eye 4 times daily        ASPIRIN PO      Take 81 mg by mouth daily        calcium polycarbophil 625 MG tablet    FIBERCON     Take 1 tablet by mouth daily        hydrocortisone 1 % ointment      Apply topically 2 times daily        Hydrocortisone Acetate 1 % Oint      Externally apply topically 2 times daily        ipratropium 0.03 % spray    ATROVENT    30 mL    SPRAYS 2 SPRAYS INTO BOTH NOSTRILS EVERY 12 HOURS    Seasonal allergic rhinitis       levothyroxine 75 MCG tablet    SYNTHROID/LEVOTHROID    30 tablet    TAKE 1 TABLET BY MOUTH DAILY    Hypothyroidism       LOPRESSOR PO      Take 25 mg by mouth 2 times daily        polyethylene glycol powder    MIRALAX/GLYCOLAX    255 g    TAKE BY MOUTH 17G IN 8OZ WATER/JUICE DAILY    Constipation, unspecified constipation type       SENEXON-S 8.6-50 MG per tablet   Generic drug:  senna-docusate     100 tablet    TAKE 1 TABLET BY MOUTH TWICE A DAY FOR CONSTIPATION    Constipation, unspecified constipation type       TRAMADOL HCL PO      Take 25-50 mg by mouth every 6 hours as needed for moderate to severe pain (1-2 every 6 hours as needed)        TYLENOL PO      Take 325 mg by mouth every 4 hours as needed for mild pain or fever        UNABLE TO FIND      Colostomy wafer change 2 times weekly on Tuesday and Friday        UNABLE TO FIND      MEDICATION NAME: LACRILUBE OPHTHALMIC 1/2 INCH THREAD DAILY        ZETIA 10 MG tablet   Generic drug:  ezetimibe     30 tablet    Take 1 tablet (10 mg) by mouth daily    Pure hypercholesterolemia

## 2018-06-19 VITALS
BODY MASS INDEX: 26.7 KG/M2 | RESPIRATION RATE: 20 BRPM | SYSTOLIC BLOOD PRESSURE: 106 MMHG | HEART RATE: 64 BPM | OXYGEN SATURATION: 93 % | TEMPERATURE: 97.6 F | WEIGHT: 132.2 LBS | DIASTOLIC BLOOD PRESSURE: 60 MMHG

## 2018-06-19 RX ORDER — DIAPER,BRIEF,INFANT-TODD,DISP
EACH MISCELLANEOUS 2 TIMES DAILY
COMMUNITY
End: 2018-12-24

## 2018-06-25 NOTE — PROGRESS NOTES
HISTORY OF PRESENT ILLNESS:  Estela is a 89 year old female (10/17/1925)  resident of [facility] who is being seen today for a routine 30 day follow up. . Patient offers no other complaint.  Staff notes no other issues.    Current medications, allergies, and interdisciplinary care plan are reviewed.      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 (H) 01/01/2011     Priority: Medium     Kyphosis 01/01/2011     Priority: Medium     Primary osteoarthritis involving multiple joints 01/01/2011     Priority: Medium     Advanced care planning/counseling discussion 07/07/2010     Priority: Medium     Benign essential hypertension 08/14/2006     Priority: Medium     Hypothyroidism 12/11/2000     Priority: Medium          Social History     Social History     Marital status:      Spouse name: N/A     Number of children: N/A     Years of education: N/A     Occupational History      Homemaker     retired     Social History Main Topics     Smoking status: Former Smoker     Smokeless tobacco: Never Used      Comment: tried to quit yes, no passive exposure     Alcohol use No     Drug use: No     Sexual activity: Not on file     Other Topics Concern     Caffeine Concern Yes     one cup daily     Parent/Sibling W/ Cabg, Mi Or Angioplasty Before 65f 55m? No     Social History Narrative    Lives at [facility]        Current Outpatient Prescriptions   Medication Sig     Acetaminophen (TYLENOL PO) Take  325 mg by mouth every 4 hours as needed for mild pain or fever     ASPIRIN PO Take 81 mg by mouth daily     calcium polycarbophil (FIBERCON) 625 MG tablet Take 1 tablet by mouth daily     hydrocortisone 1 % ointment Apply topically 2 times daily     Hypromellose (ARTIFICIAL TEARS OP) Apply to eye 4 times daily     ipratropium (ATROVENT) 0.03 % nasal spray SPRAYS 2 SPRAYS INTO BOTH NOSTRILS EVERY 12 HOURS     levothyroxine (SYNTHROID, LEVOTHROID) 75 MCG tablet TAKE 1 TABLET BY MOUTH DAILY     polyethylene glycol (MIRALAX/GLYCOLAX) powder TAKE BY MOUTH 17G IN 8OZ WATER/JUICE DAILY     SENEXON-S 8.6-50 MG per tablet TAKE 1 TABLET BY MOUTH TWICE A DAY FOR CONSTIPATION     TRAMADOL HCL PO Take 25-50 mg by mouth every 6 hours as needed for moderate to severe pain (1-2 every 6 hours as needed)      UNABLE TO FIND MEDICATION NAME: LACRILUBE OPHTHALMIC 1/2 INCH THREAD DAILY     UNABLE TO FIND Colostomy wafer change 2 times weekly on Tuesday and Friday     ZETIA 10 MG tablet Take 1 tablet (10 mg) by mouth daily     Hydrocortisone Acetate 1 % OINT Externally apply topically 2 times daily     Metoprolol Tartrate (LOPRESSOR PO) Take 25 mg by mouth 2 times daily      No current facility-administered medications for this visit.      Facility-Administered Medications Ordered in Other Visits   Medication     lidocaine 1% with EPINEPHrine 1:100,000 injection       Allergies   Allergen Reactions     Atorvastatin Calcium Other (See Comments)     Increased liver function test  Lipitor         I have reviewed the care plan and do agree with the plan.      ROS:  No chest pain, shortness of breath, fever, chills, headache, nausea, vomiting, dysuria, or changes in bowel habits.  Appetite is stable.  no pain noted.          OBJECTIVE:  /60  Pulse 64  Temp 97.6  F (36.4  C)  Resp 20  Wt 132 lb 3.2 oz (60 kg)  SpO2 93%  BMI 26.7 kg/m2    GENERAL:  Chronically ill appearing, and in no acute distress  RESP:  Lungs clear.  No rales,  rhonchi, or wheezing  CV:  RRR.  S1 S2 with out murmur. No clicks or rubs.  SKIN:  Age-related changes.  No suspicious lesions or rashes.  PSYCH:  Mentation stable, affect smiling today  EXTREM:  trace edema.  Pulses intact.          Lab/Diagnostic data:          ASSESSMENT/PLAN:  1. Benign essential hypertension    2. Chronic systolic congestive heart failure (H)    3. Hypothyroidism, unspecified type    4. Pure hypercholesterolemia    5. Dementia without behavioral disturbance, unspecified dementia type        Above issues stable.  No changes in current medications or care plan.      Total time spent with patient visit was 25 min including patient visit, review of pertinent clinical information, and treatment plan.      Taylor Barber NP

## 2018-07-16 VITALS
BODY MASS INDEX: 27.67 KG/M2 | SYSTOLIC BLOOD PRESSURE: 118 MMHG | TEMPERATURE: 97.8 F | OXYGEN SATURATION: 94 % | WEIGHT: 137 LBS | HEART RATE: 66 BPM | DIASTOLIC BLOOD PRESSURE: 66 MMHG | RESPIRATION RATE: 16 BRPM

## 2018-07-17 ENCOUNTER — NURSING HOME VISIT (OUTPATIENT)
Dept: FAMILY MEDICINE | Facility: OTHER | Age: 83
End: 2018-07-17
Payer: COMMERCIAL

## 2018-07-17 DIAGNOSIS — I10 BENIGN ESSENTIAL HYPERTENSION: ICD-10-CM

## 2018-07-17 DIAGNOSIS — E78.00 PURE HYPERCHOLESTEROLEMIA: ICD-10-CM

## 2018-07-17 DIAGNOSIS — F03.90 DEMENTIA WITHOUT BEHAVIORAL DISTURBANCE, UNSPECIFIED DEMENTIA TYPE: Primary | ICD-10-CM

## 2018-07-17 DIAGNOSIS — E03.9 HYPOTHYROIDISM, UNSPECIFIED TYPE: ICD-10-CM

## 2018-07-17 PROCEDURE — 99308 SBSQ NF CARE LOW MDM 20: CPT | Performed by: FAMILY MEDICINE

## 2018-07-17 NOTE — MR AVS SNAPSHOT
"              After Visit Summary   7/17/2018    Estela Barber    MRN: [id number]           Patient Information     Date Of Birth          10/17/1925        Visit Information        Provider Department      7/17/2018 12:39 PM Pao Chau MD [facility]        Today's Diagnoses     Dementia without behavioral disturbance, unspecified dementia type    -  1    Pure hypercholesterolemia        Benign essential hypertension        Hypothyroidism, unspecified type           Follow-ups after your visit        Follow-up notes from your care team     Return in about 4 weeks (around 8/14/2018).      Who to contact     If you have questions or need follow up information about today's clinic visit or your schedule please contact [facility] directly at [phone].  Normal or non-critical lab and imaging results will be communicated to you by MyChart, letter or phone within 4 business days after the clinic has received the results. If you do not hear from us within 7 days, please contact the clinic through MyChart or phone. If you have a critical or abnormal lab result, we will notify you by phone as soon as possible.  Submit refill requests through Lockr or call your pharmacy and they will forward the refill request to us. Please allow 3 business days for your refill to be completed.          Additional Information About Your Visit        MyChart Information     Lockr lets you send messages to your doctor, view your test results, renew your prescriptions, schedule appointments and more. To sign up, go to [email or web address] . Click on \"Log in\" on the left side of the screen, which will take you to the Welcome page. Then click on \"Sign up Now\" on the right side of the page.     You will be asked to enter the access code listed below, as well as some personal information. Please follow the directions to create your username and password.     Your access code is: " [email or web address]  Expires: 2018  1:18 PM     Your access code will  in 90 days. If you need help or a new code, please call your [facility] or [phone].        Care EveryWhere ID     This is your Care EveryWhere ID. This could be used by other organizations to access your [place] medical records  [id number]        Your Vitals Were     Pulse Temperature Respirations Pulse Oximetry BMI (Body Mass Index)       66 97.8  F (36.6  C) 16 94% 27.67 kg/m2        Blood Pressure from Last 3 Encounters:   18 118/66   18 106/60   18 110/62    Weight from Last 3 Encounters:   18 137 lb (62.1 kg)   18 132 lb 3.2 oz (60 kg)   18 128 lb 9.6 oz (58.3 kg)              Today, you had the following     No orders found for display       Primary Care Provider Office Phone # Fax #    R Nirmal Wade [phone]       [address] [place] [address]        Equal Access to Services     [facility]: Hadii gertrude kirk hadannetta Sodarryn, waaxda luqadaha, qaybta kaalmada yuli, juanita robertson . So [facility] [phone].    ATENCIÓN: Si habla español, tiene a norton disposición servicios gratuitos de asistencia lingüística. [facility] [phone].    We comply with applicable federal civil rights laws and [place] laws. We do not discriminate on the basis of race, color, national origin, age, disability, sex, sexual orientation, or gender identity.            Thank you!     Thank you for choosing [facility]  for your care. Our goal is always to provide you with excellent care. Hearing back from our patients is one way we can continue to improve our services. Please take a few minutes to complete the written survey that you may receive in the mail after your visit with us. Thank you!             Your Updated Medication List - Protect others around you: Learn how to safely use, store and throw away your medicines at www.Bugcrowdemymeds.org.           This list is accurate as of 7/17/18 11:59 PM.  Always use your most recent med list.                   Brand Name Dispense Instructions for use Diagnosis    ARTIFICIAL TEARS OP      Apply to eye 4 times daily        ASPIRIN PO      Take 81 mg by mouth daily        calcium polycarbophil 625 MG tablet    FIBERCON     Take 1 tablet by mouth daily        hydrocortisone 1 % ointment      Apply topically 2 times daily        Hydrocortisone Acetate 1 % Oint      Externally apply topically 2 times daily        ipratropium 0.03 % spray    ATROVENT    30 mL    SPRAYS 2 SPRAYS INTO BOTH NOSTRILS EVERY 12 HOURS    Seasonal allergic rhinitis       levothyroxine 75 MCG tablet    SYNTHROID/LEVOTHROID    30 tablet    TAKE 1 TABLET BY MOUTH DAILY    Hypothyroidism       LOPRESSOR PO      Take 25 mg by mouth 2 times daily        polyethylene glycol powder    MIRALAX/GLYCOLAX    255 g    TAKE BY MOUTH 17G IN 8OZ WATER/JUICE DAILY    Constipation, unspecified constipation type       SENEXON-S 8.6-50 MG per tablet   Generic drug:  senna-docusate     100 tablet    TAKE 1 TABLET BY MOUTH TWICE A DAY FOR CONSTIPATION    Constipation, unspecified constipation type       TRAMADOL HCL PO      Take 25-50 mg by mouth every 6 hours as needed for moderate to severe pain (1-2 every 6 hours as needed)        TYLENOL PO      Take 325 mg by mouth every 4 hours as needed for mild pain or fever        UNABLE TO FIND      Colostomy wafer change 2 times weekly on Tuesday and Friday        UNABLE TO FIND      MEDICATION NAME: LACRILUBE OPHTHALMIC 1/2 INCH THREAD DAILY        ZETIA 10 MG tablet   Generic drug:  ezetimibe     30 tablet    Take 1 tablet (10 mg) by mouth daily    Pure hypercholesterolemia

## 2018-07-18 ENCOUNTER — MEDICAL CORRESPONDENCE (OUTPATIENT)
Dept: HEALTH INFORMATION MANAGEMENT | Facility: CLINIC | Age: 83
End: 2018-07-18

## 2018-07-18 NOTE — PROGRESS NOTES
HISTORY OF PRESENT ILLNESS:  Estela is a 89 year old female (10/17/1925)  resident of [facility] who is being seen today for a routine 30 day follow up. She is due for labs for chronic conditions.  Staff notes her back pain is a little worse and she has been using Tramadol daily. Patient offers no other complaint.  Staff notes no other issues.    Current medications, allergies, and interdisciplinary care plan are reviewed.      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 (H) 01/01/2011     Priority: Medium     Kyphosis 01/01/2011     Priority: Medium     Primary osteoarthritis involving multiple joints 01/01/2011     Priority: Medium     Advanced care planning/counseling discussion 07/07/2010     Priority: Medium     Benign essential hypertension 08/14/2006     Priority: Medium     Hypothyroidism 12/11/2000     Priority: Medium          Social History     Social History     Marital status:      Spouse name: N/A     Number of children: N/A     Years of education: N/A     Occupational History      Homemaker     retired     Social History Main Topics     Smoking status: Former Smoker     Smokeless tobacco: Never Used      Comment: tried to quit yes, no passive exposure     Alcohol use No     Drug use: No     Sexual activity: Not on file     Other Topics Concern     Caffeine Concern Yes     one cup daily     Parent/Sibling W/ Cabg, Mi Or Angioplasty Before 65f 55m? No      Social History Narrative    Lives at [facility]        Current Outpatient Prescriptions   Medication Sig     Acetaminophen (TYLENOL PO) Take 325 mg by mouth every 4 hours as needed for mild pain or fever     ASPIRIN PO Take 81 mg by mouth daily     calcium polycarbophil (FIBERCON) 625 MG tablet Take 1 tablet by mouth daily     hydrocortisone 1 % ointment Apply topically 2 times daily     Hydrocortisone Acetate 1 % OINT Externally apply topically 2 times daily     Hypromellose (ARTIFICIAL TEARS OP) Apply to eye 4 times daily     ipratropium (ATROVENT) 0.03 % nasal spray SPRAYS 2 SPRAYS INTO BOTH NOSTRILS EVERY 12 HOURS     levothyroxine (SYNTHROID, LEVOTHROID) 75 MCG tablet TAKE 1 TABLET BY MOUTH DAILY     Metoprolol Tartrate (LOPRESSOR PO) Take 25 mg by mouth 2 times daily      polyethylene glycol (MIRALAX/GLYCOLAX) powder TAKE BY MOUTH 17G IN 8OZ WATER/JUICE DAILY     SENEXON-S 8.6-50 MG per tablet TAKE 1 TABLET BY MOUTH TWICE A DAY FOR CONSTIPATION     TRAMADOL HCL PO Take 25-50 mg by mouth every 6 hours as needed for moderate to severe pain (1-2 every 6 hours as needed)      UNABLE TO FIND MEDICATION NAME: LACRILUBE OPHTHALMIC 1/2 INCH THREAD DAILY     ZETIA 10 MG tablet Take 1 tablet (10 mg) by mouth daily     UNABLE TO FIND Colostomy wafer change 2 times weekly on Tuesday and Friday     No current facility-administered medications for this visit.      Facility-Administered Medications Ordered in Other Visits   Medication     lidocaine 1% with EPINEPHrine 1:100,000 injection       Allergies   Allergen Reactions     Atorvastatin Calcium Other (See Comments)     Increased liver function test  Lipitor         I have reviewed the care plan and do agree with the plan.      ROS:  No chest pain, shortness of breath, fever, chills, headache, nausea, vomiting, dysuria, or changes in bowel habits.  Appetite is good.  Back pain noted.          OBJECTIVE:  /66  Pulse 66  Temp 97.8  F (36.6  C)  Resp 16   Wt 137 lb (62.1 kg)  SpO2 94%  BMI 27.67 kg/m2    GENERAL:  Chronically ill appearing, alert, and in no acute distress  RESP:  Lungs clear.  No rales, rhonchi, or wheezing  CV:  RRR.  S1 S2 without murmur. No clicks or rubs.  SKIN:  Age-related changes.  No suspicious lesions or rashes.  PSYCH:  Mentation mildly confused, affect pleasant.  EXTREM:  Mild edema.          Lab/Diagnostic data:    Will send orders for TSH, BMP and lipids      ASSESSMENT/PLAN:  1. Dementia without behavioral disturbance, unspecified dementia type  No changes to current care plans.    2. Pure hypercholesterolemia  Lipids will be ordered    3. Benign essential hypertension  BMP will be ordered.    4. Hypothyroidism, unspecified type  TSH will be ordered.        Above issues stable.  No changes in current medications or care plan.      Total time spent with patient visit was 25 min including patient visit, review of pertinent clinical information, and treatment plan.      Pao Chau MD

## 2018-07-24 ENCOUNTER — TRANSFERRED RECORDS (OUTPATIENT)
Dept: HEALTH INFORMATION MANAGEMENT | Facility: CLINIC | Age: 83
End: 2018-07-24

## 2018-07-24 LAB
CHOLEST SERPL-MCNC: 194 MG/DL (ref 114–200)
CREAT SERPL-MCNC: 0.86 MG/DL (ref 0.4–1)
GLUCOSE SERPL-MCNC: 78 MG/DL (ref 70–100)
HDLC SERPL-MCNC: 44 MG/DL (ref 40–60)
LDLC SERPL CALC-MCNC: 121 MG/DL
POTASSIUM SERPL-SCNC: 4.5 MEQ/L (ref 3.4–5.1)
TRIGL SERPL-MCNC: 144 MG/DL (ref 10–200)
TSH SERPL-ACNC: 2.31 UIU/ML (ref 0.4–3.99)

## 2018-07-26 DIAGNOSIS — M25.50 MULTIPLE JOINT PAIN: Primary | ICD-10-CM

## 2018-07-26 NOTE — TELEPHONE ENCOUNTER
Tramadol- not on current med list is nursing home patient      Last Written Prescription Date:  5/22/18  Last Fill Quantity: 30,   # refills: 0  Last Office Visit: 7/17/18  Future Office visit:       Routing refill request to provider for review/approval because:  Drug not on the [facility], [facility] or [facility] refill protocol or controlled substance  Drug not active on patient's medication list

## 2018-07-27 RX ORDER — TRAMADOL HYDROCHLORIDE 50 MG/1
TABLET ORAL
Qty: 30 TABLET | Refills: 0 | Status: SHIPPED | OUTPATIENT
Start: 2018-07-27 | End: 2019-07-31

## 2018-07-27 NOTE — TELEPHONE ENCOUNTER
Yes patient is taking Tramadol PRN, does need refill and it is on her current med list.   Angie Gracia LPN

## 2018-08-21 ENCOUNTER — NURSING HOME VISIT (OUTPATIENT)
Dept: FAMILY MEDICINE | Facility: OTHER | Age: 83
End: 2018-08-21
Payer: COMMERCIAL

## 2018-08-21 ENCOUNTER — TELEPHONE (OUTPATIENT)
Dept: FAMILY MEDICINE | Facility: OTHER | Age: 83
End: 2018-08-21

## 2018-08-21 DIAGNOSIS — I10 BENIGN ESSENTIAL HYPERTENSION: ICD-10-CM

## 2018-08-21 DIAGNOSIS — I50.22 CHRONIC SYSTOLIC CONGESTIVE HEART FAILURE (H): Primary | ICD-10-CM

## 2018-08-21 DIAGNOSIS — E03.9 HYPOTHYROIDISM, UNSPECIFIED TYPE: ICD-10-CM

## 2018-08-21 DIAGNOSIS — F03.90 DEMENTIA WITHOUT BEHAVIORAL DISTURBANCE, UNSPECIFIED DEMENTIA TYPE: ICD-10-CM

## 2018-08-21 DIAGNOSIS — R35.0 URINARY FREQUENCY: Primary | ICD-10-CM

## 2018-08-21 PROCEDURE — 99308 SBSQ NF CARE LOW MDM 20: CPT | Performed by: NURSE PRACTITIONER

## 2018-08-21 ASSESSMENT — PAIN SCALES - GENERAL: PAINLEVEL: NO PAIN (0)

## 2018-08-21 NOTE — TELEPHONE ENCOUNTER
12:05 PM    Reason for Call: Phone Call    Description: Jo Ann at NH called and stated that pt may have a poss UTI. Would like nurse to call.    Was an appointment offered for this call? No  If yes : Appointment type              Date    Preferred method for responding to this message: Telephone Call  What is your phone number ?[phone]    If we cannot reach you directly, may we leave a detailed response at the number you provided? Yes    Can this message wait until your PCP/provider returns, if available today? Not applicable, provider is in    Deborah Joe

## 2018-08-21 NOTE — MR AVS SNAPSHOT
"              After Visit Summary   2018    Estela Barber    MRN: [id number]           Patient Information     Date Of Birth          10/17/1925        Visit Information        Provider Department      2018 10:13 AM Taylor Barber NP [facility]        Today's Diagnoses     Chronic systolic congestive heart failure (H)    -  1    Benign essential hypertension        Dementia without behavioral disturbance, unspecified dementia type        Hypothyroidism, unspecified type           Follow-ups after your visit        Who to contact     If you have questions or need follow up information about today's clinic visit or your schedule please contact [facility] directly at [phone].  Normal or non-critical lab and imaging results will be communicated to you by MyChart, letter or phone within 4 business days after the clinic has received the results. If you do not hear from us within 7 days, please contact the clinic through MyChart or phone. If you have a critical or abnormal lab result, we will notify you by phone as soon as possible.  Submit refill requests through [email or web address] e[employer] or call your pharmacy and they will forward the refill request to us. Please allow 3 business days for your refill to be completed.          Additional Information About Your Visit        MyChart Information     [email or web address] lets you send messages to your doctor, view your test results, renew your prescriptions, schedule appointments and more. To sign up, go to [email or web address] . Click on \"Log in\" on the left side of the screen, which will take you to the Welcome page. Then click on \"Sign up Now\" on the right side of the page.     You will be asked to enter the access code listed below, as well as some personal information. Please follow the directions to create your username and password.     Your access code is: [email or web address]  Expires: 2018  1:29 PM     Your access code will  in 90 " days. If you need help or a new code, please call your [place] clinic or [phone].        Care EveryWhere ID     This is your Care EveryWhere ID. This could be used by other organizations to access your [place] medical records  [id number]        Your Vitals Were     Pulse Temperature Respirations Pulse Oximetry BMI (Body Mass Index)       67 97.9  F (36.6  C) 16 97% 26.58 kg/m2        Blood Pressure from Last 3 Encounters:   08/21/18 113/58   07/16/18 118/66   06/13/18 106/60    Weight from Last 3 Encounters:   08/21/18 131 lb 9.6 oz (59.7 kg)   07/16/18 137 lb (62.1 kg)   06/13/18 132 lb 3.2 oz (60 kg)              Today, you had the following     No orders found for display       Primary Care Provider Office Phone # Fax #    R Nirmal Wade [phone]       [address] [place] [address]        Equal Access to Services     [facility] : Hadii aad ku hadasho Soomaali, waaxda luqadaha, qaybta kaalmada adeegyada, waxay ayshain haypam robertson . So [facility] [phone].    ATENCIÓN: Si habla español, tiene a norton disposición servicios gratuitos de asistencia lingüística. Llame al [phone].    We comply with applicable federal civil rights laws and [place] laws. We do not discriminate on the basis of race, color, national origin, age, disability, sex, sexual orientation, or gender identity.            Thank you!     Thank you for choosing [facility]  for your care. Our goal is always to provide you with excellent care. Hearing back from our patients is one way we can continue to improve our services. Please take a few minutes to complete the written survey that you may receive in the mail after your visit with us. Thank you!             Your Updated Medication List - Protect others around you: Learn how to safely use, store and throw away your medicines at www.disposemymeds.org.          This list is accurate as of 8/21/18 11:59 PM.  Always use your most  recent med list.                   Brand Name Dispense Instructions for use Diagnosis    ARTIFICIAL TEARS OP      Apply to eye 4 times daily        ASPIRIN PO      Take 81 mg by mouth daily        calcium polycarbophil 625 MG tablet    FIBERCON     Take 1 tablet by mouth daily        hydrocortisone 1 % ointment      Apply topically 2 times daily        Hydrocortisone Acetate 1 % Oint      Externally apply topically 2 times daily        ipratropium 0.03 % spray    ATROVENT    30 mL    SPRAYS 2 SPRAYS INTO BOTH NOSTRILS EVERY 12 HOURS    Seasonal allergic rhinitis       levothyroxine 75 MCG tablet    SYNTHROID/LEVOTHROID    30 tablet    TAKE 1 TABLET BY MOUTH DAILY    Hypothyroidism       LOPRESSOR PO      Take 25 mg by mouth 2 times daily        polyethylene glycol powder    MIRALAX/GLYCOLAX    255 g    TAKE BY MOUTH 17G IN 8OZ WATER/JUICE DAILY    Constipation, unspecified constipation type       SENEXON-S 8.6-50 MG per tablet   Generic drug:  senna-docusate     100 tablet    TAKE 1 TABLET BY MOUTH TWICE A DAY FOR CONSTIPATION    Constipation, unspecified constipation type       traMADol 50 MG tablet    ULTRAM    30 tablet    Take 1/2 to 1 tablet by mouth every 6 hours as needed    Multiple joint pain       TYLENOL PO      Take 325 mg by mouth every 4 hours as needed for mild pain or fever        UNABLE TO FIND      Colostomy wafer change 2 times weekly on Tuesday and Friday        UNABLE TO FIND      MEDICATION NAME: LACRILUBE OPHTHALMIC 1/2 INCH THREAD DAILY        ZETIA 10 MG tablet   Generic drug:  ezetimibe     30 tablet    Take 1 tablet (10 mg) by mouth daily    Pure hypercholesterolemia

## 2018-08-21 NOTE — TELEPHONE ENCOUNTER
Pt is very itchy and moving around a lot.  No fever and has been voiding a lot in the last hour. Pt does not seem to be herself.  No fever.  Ok to run a UA?

## 2018-08-21 NOTE — TELEPHONE ENCOUNTER
Order is ready for signature.  They also gave her some Benadryl for the itching and that seemed to help.  They would like a PRN order for this if you are ok with that?

## 2018-08-21 NOTE — TELEPHONE ENCOUNTER
OK for verbal order (They run labs through [facility]).  Also OK for Benadryl 25-50 mg by mouth every 6 hours as needed for itching.

## 2018-08-23 ENCOUNTER — TRANSFERRED RECORDS (OUTPATIENT)
Dept: HEALTH INFORMATION MANAGEMENT | Facility: CLINIC | Age: 83
End: 2018-08-23

## 2018-08-28 ENCOUNTER — TELEPHONE (OUTPATIENT)
Dept: FAMILY MEDICINE | Facility: OTHER | Age: 83
End: 2018-08-28

## 2018-08-28 NOTE — TELEPHONE ENCOUNTER
2:26 PM    Reason for Call: Phone Call    Description: pt is suffering from intense inching/tried otc benydryl not working.  Would like to talk to Dr Chau or COLLEEN Chamberlain. You can call and talk to nurse  from [facility]     Was an appointment offered for this call?  NO  If yes : Appointment type              Date    Preferred method for responding to this message: telephone  What is your phone number ?[phone]    If we cannot reach you directly, may we leave a detailed response at the number you provided? No     Can this message wait until your PCP/provider returns, if available today? No     Lori Fairchild

## 2018-08-29 NOTE — TELEPHONE ENCOUNTER
Called and spoke with Abi at [facility]. For last week patient has intense itching on her back.Benadryl gives relief.Using 25mg po every 6 hours.Only needing one in the afternoon. No new medications. Had been checked for UTI and was negative.VSS.No redness,no rash present. Since July patient has been using muscle rub on back every AM. The itching starts late morning/afternoon. Last July 2018 kidney function labs normal per nurse.Please advise.

## 2018-08-31 VITALS
SYSTOLIC BLOOD PRESSURE: 113 MMHG | RESPIRATION RATE: 16 BRPM | TEMPERATURE: 97.9 F | BODY MASS INDEX: 26.58 KG/M2 | HEART RATE: 67 BPM | DIASTOLIC BLOOD PRESSURE: 58 MMHG | OXYGEN SATURATION: 97 % | WEIGHT: 131.6 LBS

## 2018-09-04 NOTE — PROGRESS NOTES
HISTORY OF PRESENT ILLNESS:  Estela is a 89 year old female (10/17/1925)  resident of [facility] who is being seen today for a routine 30 day follow up. . Patient offers no other complaint.  Staff notes no other issues.    Current medications, allergies, and interdisciplinary care plan are reviewed.      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 (H) 01/01/2011     Priority: Medium     Kyphosis 01/01/2011     Priority: Medium     Primary osteoarthritis involving multiple joints 01/01/2011     Priority: Medium     Advanced care planning/counseling discussion 07/07/2010     Priority: Medium     Benign essential hypertension 08/14/2006     Priority: Medium     Hypothyroidism 12/11/2000     Priority: Medium          Social History     Social History     Marital status:      Spouse name: N/A     Number of children: N/A     Years of education: N/A     Occupational History      Homemaker     retired     Social History Main Topics     Smoking status: Former Smoker     Smokeless tobacco: Never Used      Comment: tried to quit yes, no passive exposure     Alcohol use No     Drug use: No     Sexual activity: Not on file     Other Topics Concern     Caffeine Concern Yes     one cup daily     Parent/Sibling W/ Cabg, Mi Or Angioplasty Before 65f 55m? No     Social History Narrative    Lives at [facility]        Current Outpatient Prescriptions   Medication Sig     Acetaminophen (TYLENOL PO) Take  325 mg by mouth every 4 hours as needed for mild pain or fever     ASPIRIN PO Take 81 mg by mouth daily     calcium polycarbophil (FIBERCON) 625 MG tablet Take 1 tablet by mouth daily     hydrocortisone 1 % ointment Apply topically 2 times daily     Hydrocortisone Acetate 1 % OINT Externally apply topically 2 times daily     Hypromellose (ARTIFICIAL TEARS OP) Apply to eye 4 times daily     ipratropium (ATROVENT) 0.03 % nasal spray SPRAYS 2 SPRAYS INTO BOTH NOSTRILS EVERY 12 HOURS     levothyroxine (SYNTHROID, LEVOTHROID) 75 MCG tablet TAKE 1 TABLET BY MOUTH DAILY     Metoprolol Tartrate (LOPRESSOR PO) Take 25 mg by mouth 2 times daily      polyethylene glycol (MIRALAX/GLYCOLAX) powder TAKE BY MOUTH 17G IN 8OZ WATER/JUICE DAILY     SENEXON-S 8.6-50 MG per tablet TAKE 1 TABLET BY MOUTH TWICE A DAY FOR CONSTIPATION     traMADol (ULTRAM) 50 MG tablet Take 1/2 to 1 tablet by mouth every 6 hours as needed     UNABLE TO FIND MEDICATION NAME: LACRILUBE OPHTHALMIC 1/2 INCH THREAD DAILY     UNABLE TO FIND Colostomy wafer change 2 times weekly on Tuesday and Friday     ZETIA 10 MG tablet Take 1 tablet (10 mg) by mouth daily     No current facility-administered medications for this visit.      Facility-Administered Medications Ordered in Other Visits   Medication     lidocaine 1% with EPINEPHrine 1:100,000 injection       Allergies   Allergen Reactions     Atorvastatin Calcium Other (See Comments)     Increased liver function test  Lipitor         I have reviewed the care plan and do agree with the plan.      ROS:  No chest pain, shortness of breath, fever, chills, headache, nausea, vomiting, dysuria, or changes in bowel habits.  Appetite is good.  pain is stable.          OBJECTIVE:  /58  Pulse 67  Temp 97.9  F (36.6  C)  Resp 16  Wt 131 lb 9.6 oz (59.7 kg)  SpO2 97%  BMI 26.58 kg/m2    GENERAL:  Chronically ill appearing, alert, and in no acute distress  RESP:  Lungs clear.  No rales, rhonchi, or wheezing  CV:   RRR.  S1 S2 with out murmur. No clicks or rubs.  SKIN:  Age-related changes.  No suspicious lesions or rashes.  PSYCH:  Mentation confused, pleasant affect  EXTREM:   mild edema.           Lab/Diagnostic data:    All are current      ASSESSMENT/PLAN:  1. Chronic systolic congestive heart failure (H)    2. Benign essential hypertension    3. Dementia without behavioral disturbance, unspecified dementia type    4. Hypothyroidism, unspecified type      Above issues stable.  No changes in current medications or care plan.      Total time spent with patient visit was 25 min including patient visit, review of pertinent clinical information, and treatment plan.      Taylor Barber NP

## 2018-09-26 ENCOUNTER — NURSING HOME VISIT (OUTPATIENT)
Dept: FAMILY MEDICINE | Facility: OTHER | Age: 83
End: 2018-09-26
Payer: COMMERCIAL

## 2018-09-26 DIAGNOSIS — E03.9 HYPOTHYROIDISM, UNSPECIFIED TYPE: ICD-10-CM

## 2018-09-26 DIAGNOSIS — I10 BENIGN ESSENTIAL HYPERTENSION: ICD-10-CM

## 2018-09-26 DIAGNOSIS — I50.9 CONGESTIVE HEART FAILURE, UNSPECIFIED HF CHRONICITY, UNSPECIFIED HEART FAILURE TYPE (H): ICD-10-CM

## 2018-09-26 DIAGNOSIS — F03.90 DEMENTIA WITHOUT BEHAVIORAL DISTURBANCE, UNSPECIFIED DEMENTIA TYPE: Primary | ICD-10-CM

## 2018-09-26 DIAGNOSIS — E78.00 PURE HYPERCHOLESTEROLEMIA: ICD-10-CM

## 2018-09-26 PROCEDURE — 99308 SBSQ NF CARE LOW MDM 20: CPT | Performed by: FAMILY MEDICINE

## 2018-09-26 NOTE — MR AVS SNAPSHOT
"              After Visit Summary   9/26/2018    Estela Barber    MRN: [id number]           Patient Information     Date Of Birth          10/17/1925        Visit Information        Provider Department      9/26/2018 10:01 AM Pao Chau MD [facility]        Today's Diagnoses     Dementia without behavioral disturbance, unspecified dementia type    -  1    Pure hypercholesterolemia        Benign essential hypertension        Congestive heart failure, unspecified HF chronicity, unspecified heart failure type (H)        Hypothyroidism, unspecified type           Follow-ups after your visit        Who to contact     If you have questions or need follow up information about today's clinic visit or your schedule please contact [facility] directly at [phone].  Normal or non-critical lab and imaging results will be communicated to you by MyChart, letter or phone within 4 business days after the clinic has received the results. If you do not hear from us within 7 days, please contact the clinic through [employer] or phone. If you have a critical or abnormal lab result, we will notify you by phone as soon as possible.  Submit refill requests through [employer] or call your pharmacy and they will forward the refill request to us. Please allow 3 business days for your refill to be completed.          Additional Information About Your Visit        MyChart Information     [employer] lets you send messages to your doctor, view your test results, renew your prescriptions, schedule appointments and more. To sign up, go to [email or web address] . Click on \"Log in\" on the left side of the screen, which will take you to the Welcome page. Then click on \"Sign up Now\" on the right side of the page.     You will be asked to enter the access code listed below, as well as some personal information. Please follow the directions to create your username and password.     Your access code " is: [email or web address]  Expires: 2018  1:29 PM     Your access code will  in 90 days. If you need help or a new code, please call your [place] clinic or [phone].        Care EveryWhere ID     This is your Care EveryWhere ID. This could be used by other organizations to access your [place] medical records  [id number]        Your Vitals Were     Pulse Temperature Respirations Pulse Oximetry BMI (Body Mass Index)       78 98  F (36.7  C) 18 96% 26.38 kg/m2        Blood Pressure from Last 3 Encounters:   10/02/18 118/72   18 113/58   18 118/66    Weight from Last 3 Encounters:   10/02/18 130 lb 9.6 oz (59.2 kg)   18 131 lb 9.6 oz (59.7 kg)   18 137 lb (62.1 kg)              Today, you had the following     No orders found for display       Primary Care Provider Office Phone # Fax #    R Nirmal Wade [phone]       [address] [place] [address]        Equal Access to Services     [facility]: Hadii gertrude kirk hadjoeo Sodarryn, waaxda luqadaha, qaybta kaalmada adesaskiayalesley, juanita robertsno . So [facility] [phone].    ATENCIÓN: Si habla español, tiene a norton disposición servicios gratuitos de asistencia lingüística. [facility] [phone].    We comply with applicable federal civil rights laws and [place] laws. We do not discriminate on the basis of race, color, national origin, age, disability, sex, sexual orientation, or gender identity.            Thank you!     Thank you for choosing [facility]  for your care. Our goal is always to provide you with excellent care. Hearing back from our patients is one way we can continue to improve our services. Please take a few minutes to complete the written survey that you may receive in the mail after your visit with us. Thank you!             Your Updated Medication List - Protect others around you: Learn how to safely use, store and throw away your medicines at  www.disposemymeds.org.          This list is accurate as of 9/26/18 11:59 PM.  Always use your most recent med list.                   Brand Name Dispense Instructions for use Diagnosis    ARTIFICIAL TEARS OP      Apply to eye 4 times daily        ASPIRIN PO      Take 81 mg by mouth daily        calcium polycarbophil 625 MG tablet    FIBERCON     Take 1 tablet by mouth daily        hydrocortisone 1 % ointment      Apply topically 2 times daily        Hydrocortisone Acetate 1 % Oint      Externally apply topically 2 times daily        ipratropium 0.03 % spray    ATROVENT    30 mL    SPRAYS 2 SPRAYS INTO BOTH NOSTRILS EVERY 12 HOURS    Seasonal allergic rhinitis       levothyroxine 75 MCG tablet    SYNTHROID/LEVOTHROID    30 tablet    TAKE 1 TABLET BY MOUTH DAILY    Hypothyroidism       LOPRESSOR PO      Take 25 mg by mouth 2 times daily        polyethylene glycol powder    MIRALAX/GLYCOLAX    255 g    TAKE BY MOUTH 17G IN 8OZ WATER/JUICE DAILY    Constipation, unspecified constipation type       SENEXON-S 8.6-50 MG per tablet   Generic drug:  senna-docusate     100 tablet    TAKE 1 TABLET BY MOUTH TWICE A DAY FOR CONSTIPATION    Constipation, unspecified constipation type       traMADol 50 MG tablet    ULTRAM    30 tablet    Take 1/2 to 1 tablet by mouth every 6 hours as needed    Multiple joint pain       TYLENOL PO      Take 325 mg by mouth every 4 hours as needed for mild pain or fever        UNABLE TO FIND      Colostomy wafer change 2 times weekly on Tuesday and Friday        UNABLE TO FIND      MEDICATION NAME: LACRILUBE OPHTHALMIC 1/2 INCH THREAD DAILY        ZETIA 10 MG tablet   Generic drug:  ezetimibe     30 tablet    Take 1 tablet (10 mg) by mouth daily    Pure hypercholesterolemia

## 2018-10-02 VITALS
SYSTOLIC BLOOD PRESSURE: 118 MMHG | WEIGHT: 130.6 LBS | RESPIRATION RATE: 18 BRPM | DIASTOLIC BLOOD PRESSURE: 72 MMHG | BODY MASS INDEX: 26.38 KG/M2 | OXYGEN SATURATION: 96 % | TEMPERATURE: 98 F | HEART RATE: 78 BPM

## 2018-10-02 NOTE — PROGRESS NOTES
HISTORY OF PRESENT ILLNESS:  Estela is a 89 year old female (10/17/1925)  resident of [facility] who is being seen today for a routine 30 day follow up. She has had an intermittent cough that is not worsening. Patient offers no other complaint.  Staff notes no other issues, but would like to have a protective powder or ointment for colostomy stoma due to redness.      Current medications, allergies, and interdisciplinary care plan are reviewed.      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 (H) 01/01/2011     Priority: Medium     Kyphosis 01/01/2011     Priority: Medium     Primary osteoarthritis involving multiple joints 01/01/2011     Priority: Medium     Advanced care planning/counseling discussion 07/07/2010     Priority: Medium     Benign essential hypertension 08/14/2006     Priority: Medium     Hypothyroidism 12/11/2000     Priority: Medium          Social History     Social History     Marital status:      Spouse name: N/A     Number of children: N/A     Years of education: N/A     Occupational History      Homemaker     retired     Social History Main Topics     Smoking status: Former Smoker     Smokeless tobacco: Never Used      Comment: tried to quit yes, no passive exposure     Alcohol use No     Drug use: No     Sexual activity: Not on file     Other Topics Concern     Caffeine Concern Yes     one cup daily     Parent/Sibling W/ Cabg, Mi Or Angioplasty  Before 65f 55m? No     Social History Narrative    Lives at [facility]        Current Outpatient Prescriptions   Medication Sig     Acetaminophen (TYLENOL PO) Take 325 mg by mouth every 4 hours as needed for mild pain or fever     ASPIRIN PO Take 81 mg by mouth daily     calcium polycarbophil (FIBERCON) 625 MG tablet Take 1 tablet by mouth daily     hydrocortisone 1 % ointment Apply topically 2 times daily     Hydrocortisone Acetate 1 % OINT Externally apply topically 2 times daily     Hypromellose (ARTIFICIAL TEARS OP) Apply to eye 4 times daily     ipratropium (ATROVENT) 0.03 % nasal spray SPRAYS 2 SPRAYS INTO BOTH NOSTRILS EVERY 12 HOURS     levothyroxine (SYNTHROID, LEVOTHROID) 75 MCG tablet TAKE 1 TABLET BY MOUTH DAILY     Metoprolol Tartrate (LOPRESSOR PO) Take 25 mg by mouth 2 times daily      polyethylene glycol (MIRALAX/GLYCOLAX) powder TAKE BY MOUTH 17G IN 8OZ WATER/JUICE DAILY     SENEXON-S 8.6-50 MG per tablet TAKE 1 TABLET BY MOUTH TWICE A DAY FOR CONSTIPATION     traMADol (ULTRAM) 50 MG tablet Take 1/2 to 1 tablet by mouth every 6 hours as needed     UNABLE TO FIND MEDICATION NAME: LACRILUBE OPHTHALMIC 1/2 INCH THREAD DAILY     UNABLE TO FIND Colostomy wafer change 2 times weekly on Tuesday and Friday     ZETIA 10 MG tablet Take 1 tablet (10 mg) by mouth daily     No current facility-administered medications for this visit.      Facility-Administered Medications Ordered in Other Visits   Medication     lidocaine 1% with EPINEPHrine 1:100,000 injection       Allergies   Allergen Reactions     Atorvastatin Calcium Other (See Comments)     Increased liver function test  Lipitor         I have reviewed the care plan and do agree with the plan.      ROS:  No chest pain, shortness of breath, fever, chills, headache, nausea, vomiting, dysuria, or changes in bowel habits.  Appetite is good.  No pain noted.          OBJECTIVE:  /72  Pulse 78  Temp 98  F (36.7  C)  Resp 18  Wt 130 lb 9.6 oz  (59.2 kg)  SpO2 96%  BMI 26.38 kg/m2    GENERAL:  Chronically ill appearing, alert, and in no acute distress  RESP:  Lungs clear.  No rales, rhonchi, or wheezing  CV:  RRR.  S1 S2 without murmur. No clicks or rubs.  SKIN:  Age-related changes.  No suspicious lesions or rashes.  PSYCH:  Mentation mildly confused, affect pleasant.  EXTREM:  Mild edema.          Lab/Diagnostic data:    Updated in July, will be due again in January      ASSESSMENT/PLAN:  1. Dementia without behavioral disturbance, unspecified dementia type  No changes to current care plans.  Stoma powder added as needed.    2. Pure hypercholesterolemia  Labs due in January    3. Benign essential hypertension  Labs due in January    4. Congestive heart failure, unspecified HF chronicity, unspecified heart failure type (H)  No changes to current plans    5. Hypothyroidism, unspecified type  Labs due in January        Above issues stable.  No changes in current medications or care plan.      Total time spent with patient visit was 25 min including patient visit, review of pertinent clinical information, and treatment plan.      Pao Chau MD

## 2018-10-02 NOTE — NURSING NOTE
"Chief Complaint   Patient presents with     Care Coordination Nursing Home       Initial /72  Pulse 78  Temp 98  F (36.7  C)  Resp 18  Wt 130 lb 9.6 oz (59.2 kg)  SpO2 96%  BMI 26.38 kg/m2 Estimated body mass index is 26.38 kg/(m^2) as calculated from the following:    Height as of 12/13/16: 4' 11\" (1.499 m).    Weight as of this encounter: 130 lb 9.6 oz (59.2 kg).  Medication Reconciliation: complete    Jordyn Pedroza MA  "

## 2018-10-23 ENCOUNTER — NURSING HOME VISIT (OUTPATIENT)
Dept: FAMILY MEDICINE | Facility: OTHER | Age: 83
End: 2018-10-23
Payer: COMMERCIAL

## 2018-10-23 DIAGNOSIS — F03.90 DEMENTIA WITHOUT BEHAVIORAL DISTURBANCE, UNSPECIFIED DEMENTIA TYPE: Primary | ICD-10-CM

## 2018-10-23 DIAGNOSIS — E03.9 HYPOTHYROIDISM, UNSPECIFIED TYPE: ICD-10-CM

## 2018-10-23 DIAGNOSIS — I10 BENIGN ESSENTIAL HYPERTENSION: ICD-10-CM

## 2018-10-23 DIAGNOSIS — I50.9 CONGESTIVE HEART FAILURE, UNSPECIFIED HF CHRONICITY, UNSPECIFIED HEART FAILURE TYPE (H): ICD-10-CM

## 2018-10-23 DIAGNOSIS — I25.119 ATHEROSCLEROSIS OF NATIVE CORONARY ARTERY OF NATIVE HEART WITH ANGINA PECTORIS (H): ICD-10-CM

## 2018-10-23 DIAGNOSIS — E78.00 PURE HYPERCHOLESTEROLEMIA: ICD-10-CM

## 2018-10-23 PROCEDURE — 99308 SBSQ NF CARE LOW MDM 20: CPT | Performed by: NURSE PRACTITIONER

## 2018-10-23 NOTE — MR AVS SNAPSHOT
"              After Visit Summary   10/23/2018    Estela Barber    MRN: [id number]           Patient Information     Date Of Birth          10/17/1925        Visit Information        Provider Department      10/23/2018 3:18 PM Taylor Barber NP [facility]        Today's Diagnoses     Dementia without behavioral disturbance, unspecified dementia type    -  1    Atherosclerosis of native coronary artery of native heart with angina pectoris (H)        Hypothyroidism, unspecified type        Pure hypercholesterolemia        Benign essential hypertension        Congestive heart failure, unspecified HF chronicity, unspecified heart failure type (H)           Follow-ups after your visit        Follow-up notes from your care team     Return if symptoms worsen or fail to improve.      Who to contact     If you have questions or need follow up information about today's clinic visit or your schedule please contact [facility] directly at [phone].  Normal or non-critical lab and imaging results will be communicated to you by MyChart, letter or phone within 4 business days after the clinic has received the results. If you do not hear from us within 7 days, please contact the clinic through MyChart or phone. If you have a critical or abnormal lab result, we will notify you by phone as soon as possible.  Submit refill requests through [employer] or call your pharmacy and they will forward the refill request to us. Please allow 3 business days for your refill to be completed.          Additional Information About Your Visit        MyChart Information     [employer] lets you send messages to your doctor, view your test results, renew your prescriptions, schedule appointments and more. To sign up, go to [email or web address] . Click on \"Log in\" on the left side of the screen, which will take you to the Welcome page. Then click on \"Sign up Now\" on the right side of the page. "     You will be asked to enter the access code listed below, as well as some personal information. Please follow the directions to create your username and password.     Your access code is: [email or web address]  Expires: 2018 12:29 PM     Your access code will  in 90 days. If you need help or a new code, please call your [facility] or [phone].        Care EveryWhere ID     This is your Care EveryWhere ID. This could be used by other organizations to access your [place] medical records  [id number]        Your Vitals Were     Pulse Temperature Respirations Pulse Oximetry BMI (Body Mass Index)       64 98.2  F (36.8  C) 16 92% 27.02 kg/m2        Blood Pressure from Last 3 Encounters:   10/23/18 116/60   10/02/18 118/72   18 113/58    Weight from Last 3 Encounters:   10/23/18 133 lb 12.8 oz (60.7 kg)   10/02/18 130 lb 9.6 oz (59.2 kg)   18 131 lb 9.6 oz (59.7 kg)              Today, you had the following     No orders found for display       Primary Care Provider Office Phone # Fax #    R Nirmal Wade [phone]       [address] [place] [address]        Equal Access to Services     [facility]: Hadii gertrude kirk hadasho Sodarryn, waaxda luqadaha, qaybta kaalmada adeegyada, juanita robertson . So [facility] [phone].    ATENCIÓN: Si habla español, tiene a norton disposición servicios gratuitos de asistencia lingüística. Llame al [phone].    We comply with applicable federal civil rights laws and [place] laws. We do not discriminate on the basis of race, color, national origin, age, disability, sex, sexual orientation, or gender identity.            Thank you!     Thank you for choosing [facility]  for your care. Our goal is always to provide you with excellent care. Hearing back from our patients is one way we can continue to improve our services. Please take a few minutes to complete the written survey that you may  receive in the mail after your visit with us. Thank you!             Your Updated Medication List - Protect others around you: Learn how to safely use, store and throw away your medicines at www.disposemymeds.org.          This list is accurate as of 10/23/18 11:59 PM.  Always use your most recent med list.                   Brand Name Dispense Instructions for use Diagnosis    ARTIFICIAL TEARS OP      Apply to eye 4 times daily        ASPIRIN PO      Take 81 mg by mouth daily        calcium polycarbophil 625 MG tablet    FIBERCON     Take 1 tablet by mouth daily        hydrocortisone 1 % ointment      Apply topically 2 times daily        Hydrocortisone Acetate 1 % Oint      Externally apply topically 2 times daily        ipratropium 0.03 % spray    ATROVENT    30 mL    SPRAYS 2 SPRAYS INTO BOTH NOSTRILS EVERY 12 HOURS    Seasonal allergic rhinitis       levothyroxine 75 MCG tablet    SYNTHROID/LEVOTHROID    30 tablet    TAKE 1 TABLET BY MOUTH DAILY    Hypothyroidism       LOPRESSOR PO      Take 25 mg by mouth 2 times daily        polyethylene glycol powder    MIRALAX/GLYCOLAX    255 g    TAKE BY MOUTH 17G IN 8OZ WATER/JUICE DAILY    Constipation, unspecified constipation type       SENEXON-S 8.6-50 MG per tablet   Generic drug:  senna-docusate     100 tablet    TAKE 1 TABLET BY MOUTH TWICE A DAY FOR CONSTIPATION    Constipation, unspecified constipation type       traMADol 50 MG tablet    ULTRAM    30 tablet    Take 1/2 to 1 tablet by mouth every 6 hours as needed    Multiple joint pain       TYLENOL PO      Take 325 mg by mouth every 4 hours as needed for mild pain or fever        UNABLE TO FIND      Colostomy wafer change 2 times weekly on Tuesday and Friday        UNABLE TO FIND      MEDICATION NAME: LACRILUBE OPHTHALMIC 1/2 INCH THREAD DAILY        ZETIA 10 MG tablet   Generic drug:  ezetimibe     30 tablet    Take 1 tablet (10 mg) by mouth daily    Pure hypercholesterolemia

## 2018-10-29 ENCOUNTER — NURSING HOME VISIT (OUTPATIENT)
Dept: FAMILY MEDICINE | Facility: OTHER | Age: 83
End: 2018-10-29
Payer: COMMERCIAL

## 2018-10-29 VITALS
RESPIRATION RATE: 16 BRPM | DIASTOLIC BLOOD PRESSURE: 60 MMHG | TEMPERATURE: 98.2 F | HEART RATE: 64 BPM | BODY MASS INDEX: 27.02 KG/M2 | SYSTOLIC BLOOD PRESSURE: 116 MMHG | WEIGHT: 133.8 LBS | OXYGEN SATURATION: 92 %

## 2018-10-29 DIAGNOSIS — Z53.9 ERRONEOUS ENCOUNTER--DISREGARD: Primary | ICD-10-CM

## 2018-10-29 NOTE — PROGRESS NOTES
A user error has taken place: encounter opened in error, closed for administrative reasons  Pamela M Lechevalier LPN  .      This encounter was opened in error. Please disregard.

## 2018-10-29 NOTE — MR AVS SNAPSHOT
"              After Visit Summary   10/29/2018    Estela Barber    MRN: [id number]           Patient Information     Date Of Birth          10/17/1925        Visit Information        Provider Department      10/29/2018 3:14 PM Taylor Barber NP [facility]        Today's Diagnoses     ERRONEOUS ENCOUNTER--DISREGARD    -  1       Follow-ups after your visit        Who to contact     If you have questions or need follow up information about today's clinic visit or your schedule please contact [facility] directly at [phone].  Normal or non-critical lab and imaging results will be communicated to you by Curbed.comhart, letter or phone within 4 business days after the clinic has received the results. If you do not hear from us within 7 days, please contact the clinic through Curbed.comhart or phone. If you have a critical or abnormal lab result, we will notify you by phone as soon as possible.  Submit refill requests through Roller or call your pharmacy and they will forward the refill request to us. Please allow 3 business days for your refill to be completed.          Additional Information About Your Visit        MyChart Information     Roller lets you send messages to your doctor, view your test results, renew your prescriptions, schedule appointments and more. To sign up, go to [email or web address] . Click on \"Log in\" on the left side of the screen, which will take you to the Welcome page. Then click on \"Sign up Now\" on the right side of the page.     You will be asked to enter the access code listed below, as well as some personal information. Please follow the directions to create your username and password.     Your access code is: [email or web address]  Expires: 2018 12:29 PM     Your access code will  in 90 days. If you need help or a new code, please call your [place] clinic or [phone].        Care EveryWhere ID     This is your Care EveryWhere ID. " This could be used by other organizations to access your [place] medical records  [id number]         Blood Pressure from Last 3 Encounters:   10/23/18 116/60   10/02/18 118/72   08/21/18 113/58    Weight from Last 3 Encounters:   10/23/18 133 lb 12.8 oz (60.7 kg)   10/02/18 130 lb 9.6 oz (59.2 kg)   08/21/18 131 lb 9.6 oz (59.7 kg)              Today, you had the following     No orders found for display       Primary Care Provider Office Phone # Fax #    R Nirmal Wade [phone]       [address] [place] [address]        Equal Access to Services     SHIRLEY LAGUNA : Hadii gertrude lathamo Markie, waaxda luqadaha, qaybta kaalmada madhuyalesley, juanita alcantara. So [facility] [phone].    ATENCIÓN: Si habla español, tiene a norton disposición servicios gratuitos de asistencia lingüística. [facility] [phone].    We comply with applicable federal civil rights laws and [place] laws. We do not discriminate on the basis of race, color, national origin, age, disability, sex, sexual orientation, or gender identity.            Thank you!     Thank you for choosing [facility]  for your care. Our goal is always to provide you with excellent care. Hearing back from our patients is one way we can continue to improve our services. Please take a few minutes to complete the written survey that you may receive in the mail after your visit with us. Thank you!             Your Updated Medication List - Protect others around you: Learn how to safely use, store and throw away your medicines at www.disposemymeds.org.          This list is accurate as of 10/29/18 11:59 PM.  Always use your most recent med list.                   Brand Name Dispense Instructions for use Diagnosis    ARTIFICIAL TEARS OP      Apply to eye 4 times daily        ASPIRIN PO      Take 81 mg by mouth daily        calcium polycarbophil 625 MG tablet    FIBERCON     Take 1 tablet by mouth  daily        hydrocortisone 1 % ointment      Apply topically 2 times daily        Hydrocortisone Acetate 1 % Oint      Externally apply topically 2 times daily        ipratropium 0.03 % spray    ATROVENT    30 mL    SPRAYS 2 SPRAYS INTO BOTH NOSTRILS EVERY 12 HOURS    Seasonal allergic rhinitis       levothyroxine 75 MCG tablet    SYNTHROID/LEVOTHROID    30 tablet    TAKE 1 TABLET BY MOUTH DAILY    Hypothyroidism       LOPRESSOR PO      Take 25 mg by mouth 2 times daily        polyethylene glycol powder    MIRALAX/GLYCOLAX    255 g    TAKE BY MOUTH 17G IN 8OZ WATER/JUICE DAILY    Constipation, unspecified constipation type       SENEXON-S 8.6-50 MG per tablet   Generic drug:  senna-docusate     100 tablet    TAKE 1 TABLET BY MOUTH TWICE A DAY FOR CONSTIPATION    Constipation, unspecified constipation type       traMADol 50 MG tablet    ULTRAM    30 tablet    Take 1/2 to 1 tablet by mouth every 6 hours as needed    Multiple joint pain       TYLENOL PO      Take 325 mg by mouth every 4 hours as needed for mild pain or fever        UNABLE TO FIND      Colostomy wafer change 2 times weekly on Tuesday and Friday        UNABLE TO FIND      MEDICATION NAME: LACRILUBE OPHTHALMIC 1/2 INCH THREAD DAILY        ZETIA 10 MG tablet   Generic drug:  ezetimibe     30 tablet    Take 1 tablet (10 mg) by mouth daily    Pure hypercholesterolemia

## 2018-11-05 NOTE — PROGRESS NOTES
HISTORY OF PRESENT ILLNESS:  Estela is a 89 year old female (10/17/1925)  resident of [facility] who is being seen today for a routine 30 day follow up.  Patient offers no other complaint.  Staff notes no other issues, but are requesting order for stoma paste and powder to help her appliance stay intact.     Current medications, allergies, and interdisciplinary care plan are reviewed.      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 (H) 01/01/2011     Priority: Medium     Kyphosis 01/01/2011     Priority: Medium     Primary osteoarthritis involving multiple joints 01/01/2011     Priority: Medium     Advanced care planning/counseling discussion 07/07/2010     Priority: Medium     Benign essential hypertension 08/14/2006     Priority: Medium     Hypothyroidism 12/11/2000     Priority: Medium          Social History     Social History     Marital status:      Spouse name: N/A     Number of children: N/A     Years of education: N/A     Occupational History      Homemaker     retired     Social History Main Topics     Smoking status: Former Smoker     Smokeless tobacco: Never Used      Comment: tried to quit yes, no passive exposure     Alcohol use No     Drug use: No     Sexual activity: Not on file     Other Topics Concern     Caffeine Concern Yes     one cup daily     Parent/Sibling W/ Cabg, Mi Or Angioplasty Before 65f 55m? No     Social History Narrative    Lives at [facility]         Current Outpatient Prescriptions   Medication Sig     Acetaminophen (TYLENOL PO) Take 325 mg by mouth every 4 hours as needed for mild pain or fever     ASPIRIN PO Take 81 mg by mouth daily     calcium polycarbophil (FIBERCON) 625 MG tablet Take 1 tablet by mouth daily     Hydrocortisone Acetate 1 % OINT Externally apply topically 2 times daily     Hypromellose (ARTIFICIAL TEARS OP) Apply to eye 4 times daily     ipratropium (ATROVENT) 0.03 % nasal spray SPRAYS 2 SPRAYS INTO BOTH NOSTRILS EVERY 12 HOURS     levothyroxine (SYNTHROID, LEVOTHROID) 75 MCG tablet TAKE 1 TABLET BY MOUTH DAILY     Metoprolol Tartrate (LOPRESSOR PO) Take 25 mg by mouth 2 times daily      polyethylene glycol (MIRALAX/GLYCOLAX) powder TAKE BY MOUTH 17G IN 8OZ WATER/JUICE DAILY     SENEXON-S 8.6-50 MG per tablet TAKE 1 TABLET BY MOUTH TWICE A DAY FOR CONSTIPATION     traMADol (ULTRAM) 50 MG tablet Take 1/2 to 1 tablet by mouth every 6 hours as needed     UNABLE TO FIND MEDICATION NAME: LACRILUBE OPHTHALMIC 1/2 INCH THREAD DAILY     UNABLE TO FIND Colostomy wafer change 2 times weekly on Tuesday and Friday     ZETIA 10 MG tablet Take 1 tablet (10 mg) by mouth daily     hydrocortisone 1 % ointment Apply topically 2 times daily     No current facility-administered medications for this visit.      Facility-Administered Medications Ordered in Other Visits   Medication     lidocaine 1% with EPINEPHrine 1:100,000 injection       Allergies   Allergen Reactions     Atorvastatin Calcium Other (See Comments)     Increased liver function test  Lipitor         I have reviewed the care plan and do agree with the plan.      ROS:  No chest pain, shortness of breath, fever, chills, headache, nausea, vomiting, dysuria, or changes in bowel habits.  Appetite is good.  no pain noted.          OBJECTIVE:  /60  Pulse 64  Temp 98.2  F (36.8  C)  Resp 16  Wt 133 lb 12.8 oz (60.7 kg)  SpO2 92%  BMI 27.02 kg/m2    GENERAL:  Chronically ill appearing,  alert, and in no acute distress  RESP:  Lungs clear.  No rales, rhonchi, or wheezing  CV:  RRR.  S1 S2 with out murmur. No clicks or rubs.  SKIN:  Age-related changes.  No suspicious lesions or rashes.  PSYCH:  Mentation stable, affect pleasant  EXTREM:  trace edema.  Pulses intact.          Lab/Diagnostic data:        ASSESSMENT/PLAN:  1. Dementia without behavioral disturbance, unspecified dementia type      2. Atherosclerosis of native coronary artery of native heart with angina pectoris (H)      3. Hypothyroidism, unspecified type      4. Pure hypercholesterolemia      5. Benign essential hypertension      6. Congestive heart failure, unspecified HF chronicity, unspecified heart failure type (H)      Order placed for stoma powder and paste.         Above issues stable.  No changes in current medications or care plan.      Total time spent with patient visit was 25 min including patient visit, review of pertinent clinical information, and treatment plan.      Taylor Barber NP

## 2018-11-21 ENCOUNTER — NURSING HOME VISIT (OUTPATIENT)
Dept: FAMILY MEDICINE | Facility: OTHER | Age: 83
End: 2018-11-21
Payer: COMMERCIAL

## 2018-11-21 DIAGNOSIS — F03.90 DEMENTIA WITHOUT BEHAVIORAL DISTURBANCE, UNSPECIFIED DEMENTIA TYPE: Primary | ICD-10-CM

## 2018-11-21 DIAGNOSIS — I50.9 CONGESTIVE HEART FAILURE, UNSPECIFIED HF CHRONICITY, UNSPECIFIED HEART FAILURE TYPE (H): ICD-10-CM

## 2018-11-21 PROCEDURE — 99308 SBSQ NF CARE LOW MDM 20: CPT | Performed by: FAMILY MEDICINE

## 2018-11-21 NOTE — MR AVS SNAPSHOT
"              After Visit Summary   2018    Estela Barber    MRN: [id number]           Patient Information     Date Of Birth          10/17/1925        Visit Information        Provider Department      2018 3:55 PM Pao Chau MD [facility]        Today's Diagnoses     Dementia without behavioral disturbance, unspecified dementia type    -  1    Congestive heart failure, unspecified HF chronicity, unspecified heart failure type (H)           Follow-ups after your visit        Who to contact     If you have questions or need follow up information about today's clinic visit or your schedule please contact [facility] directly at [phone].  Normal or non-critical lab and imaging results will be communicated to you by MyChart, letter or phone within 4 business days after the clinic has received the results. If you do not hear from us within 7 days, please contact the clinic through MyChart or phone. If you have a critical or abnormal lab result, we will notify you by phone as soon as possible.  Submit refill requests through [employer] Zero or call your pharmacy and they will forward the refill request to us. Please allow 3 business days for your refill to be completed.          Additional Information About Your Visit        MyChart Information     Goal Zero lets you send messages to your doctor, view your test results, renew your prescriptions, schedule appointments and more. To sign up, go to [email or web address] . Click on \"Log in\" on the left side of the screen, which will take you to the Welcome page. Then click on \"Sign up Now\" on the right side of the page.     You will be asked to enter the access code listed below, as well as some personal information. Please follow the directions to create your username and password.     Your access code is: [email or web address]  Expires: 2018 12:29 PM     Your access code will  in 90 days. If you need help or " a new code, please call your [place] clinic or [phone].        Care EveryWhere ID     This is your Care EveryWhere ID. This could be used by other organizations to access your [place] medical records  [id number]        Your Vitals Were     Pulse Temperature Respirations Pulse Oximetry BMI (Body Mass Index)       60 97.9  F (36.6  C) 16 92% 26.58 kg/m2        Blood Pressure from Last 3 Encounters:   11/23/18 110/60   11/23/18 110/60   10/23/18 116/60    Weight from Last 3 Encounters:   11/23/18 131 lb 9.6 oz (59.7 kg)   11/23/18 131 lb 9.6 oz (59.7 kg)   10/23/18 133 lb 12.8 oz (60.7 kg)              Today, you had the following     No orders found for display       Primary Care Provider Office Phone # Fax #    R Nirmal Wade [phone]       [address] [place] [address]        Equal Access to Services     [facility] : Hadii gertrude ku hadasho Soomaali, waaxda luqadaha, qaybta kaalmada adeegyada, waxay ny robertson . So [facility] [phone].    ATENCIÓN: Si habla español, tiene a norton disposición servicios gratuitos de asistencia lingüística. [facility] [phone].    We comply with applicable federal civil rights laws and [place] laws. We do not discriminate on the basis of race, color, national origin, age, disability, sex, sexual orientation, or gender identity.            Thank you!     Thank you for choosing [facility]  for your care. Our goal is always to provide you with excellent care. Hearing back from our patients is one way we can continue to improve our services. Please take a few minutes to complete the written survey that you may receive in the mail after your visit with us. Thank you!             Your Updated Medication List - Protect others around you: Learn how to safely use, store and throw away your medicines at www.disposemymeds.org.          This list is accurate as of 11/21/18 11:59 PM.  Always use your most recent med  list.                   Brand Name Dispense Instructions for use Diagnosis    ARTIFICIAL TEARS OP      Apply to eye 4 times daily        ASPIRIN PO      Take 81 mg by mouth daily        calcium polycarbophil 625 MG tablet    FIBERCON     Take 1 tablet by mouth daily        hydrocortisone 1 % ointment      Apply topically 2 times daily        Hydrocortisone Acetate 1 % Oint      Externally apply topically 2 times daily        ipratropium 0.03 % nasal spray    ATROVENT    30 mL    SPRAYS 2 SPRAYS INTO BOTH NOSTRILS EVERY 12 HOURS    Seasonal allergic rhinitis       levothyroxine 75 MCG tablet    SYNTHROID/LEVOTHROID    30 tablet    TAKE 1 TABLET BY MOUTH DAILY    Hypothyroidism       LOPRESSOR PO      Take 25 mg by mouth 2 times daily        polyethylene glycol powder    MIRALAX/GLYCOLAX    255 g    TAKE BY MOUTH 17G IN 8OZ WATER/JUICE DAILY    Constipation, unspecified constipation type       SENEXON-S 8.6-50 MG per tablet   Generic drug:  senna-docusate     100 tablet    TAKE 1 TABLET BY MOUTH TWICE A DAY FOR CONSTIPATION    Constipation, unspecified constipation type       traMADol 50 MG tablet    ULTRAM    30 tablet    Take 1/2 to 1 tablet by mouth every 6 hours as needed    Multiple joint pain       TYLENOL PO      Take 325 mg by mouth every 4 hours as needed for mild pain or fever        UNABLE TO FIND      Colostomy wafer change 2 times weekly on Tuesday and Friday        UNABLE TO FIND      MEDICATION NAME: LACRILUBE OPHTHALMIC 1/2 INCH THREAD DAILY        ZETIA 10 MG tablet   Generic drug:  ezetimibe     30 tablet    Take 1 tablet (10 mg) by mouth daily    Pure hypercholesterolemia

## 2018-11-23 ENCOUNTER — NURSING HOME VISIT (OUTPATIENT)
Dept: FAMILY MEDICINE | Facility: OTHER | Age: 83
End: 2018-11-23
Payer: COMMERCIAL

## 2018-11-23 VITALS
RESPIRATION RATE: 16 BRPM | WEIGHT: 131.6 LBS | HEART RATE: 60 BPM | SYSTOLIC BLOOD PRESSURE: 110 MMHG | DIASTOLIC BLOOD PRESSURE: 60 MMHG | OXYGEN SATURATION: 92 % | TEMPERATURE: 97.9 F | BODY MASS INDEX: 26.58 KG/M2

## 2018-11-23 VITALS
TEMPERATURE: 97.9 F | RESPIRATION RATE: 16 BRPM | BODY MASS INDEX: 26.58 KG/M2 | DIASTOLIC BLOOD PRESSURE: 60 MMHG | OXYGEN SATURATION: 92 % | HEART RATE: 60 BPM | WEIGHT: 131.6 LBS | SYSTOLIC BLOOD PRESSURE: 110 MMHG

## 2018-11-23 DIAGNOSIS — Z53.9 ERRONEOUS ENCOUNTER--DISREGARD: Primary | ICD-10-CM

## 2018-11-23 NOTE — MR AVS SNAPSHOT
"              After Visit Summary   2018    Estela Barber    MRN: [id number]           Patient Information     Date Of Birth          10/17/1925        Visit Information        Provider Department      2018 3:25 PM Pao Chau MD [facility]        Today's Diagnoses     ERRONEOUS ENCOUNTER--DISREGARD    -  1       Follow-ups after your visit        Who to contact     If you have questions or need follow up information about today's clinic visit or your schedule please contact [facility] directly at [phone].  Normal or non-critical lab and imaging results will be communicated to you by The Paper Storehart, letter or phone within 4 business days after the clinic has received the results. If you do not hear from us within 7 days, please contact the clinic through The Paper Storehart or phone. If you have a critical or abnormal lab result, we will notify you by phone as soon as possible.  Submit refill requests through [employer] or call your pharmacy and they will forward the refill request to us. Please allow 3 business days for your refill to be completed.          Additional Information About Your Visit        MyChart Information     [employer] lets you send messages to your doctor, view your test results, renew your prescriptions, schedule appointments and more. To sign up, go to [email or web address] . Click on \"Log in\" on the left side of the screen, which will take you to the Welcome page. Then click on \"Sign up Now\" on the right side of the page.     You will be asked to enter the access code listed below, as well as some personal information. Please follow the directions to create your username and password.     Your access code is: [email or web address]  Expires: 2018 12:29 PM     Your access code will  in 90 days. If you need help or a new code, please call your [facility] or [phone].        Care EveryWhere ID     This is your Care EveryWhere ID. This " could be used by other organizations to access your [place] medical records  [id number]        Your Vitals Were     Pulse Temperature Respirations Pulse Oximetry BMI (Body Mass Index)       60 97.9  F (36.6  C) 16 92% 26.58 kg/m2        Blood Pressure from Last 3 Encounters:   11/23/18 110/60   10/23/18 116/60   10/02/18 118/72    Weight from Last 3 Encounters:   11/23/18 131 lb 9.6 oz (59.7 kg)   10/23/18 133 lb 12.8 oz (60.7 kg)   10/02/18 130 lb 9.6 oz (59.2 kg)              Today, you had the following     No orders found for display       Primary Care Provider Office Phone # Fax #    R Nirmal Wade [phone]       [facility] [place] [address]        Equal Access to Services     SKYLER LAGUNA : Hadii aad ku hadasho Sodarryn, waaxda luqadaha, qaybta kaalmada adesaskiayada, juanita robertson . So [facility] [phone].    ATENCIÓN: Si habla español, tiene a norton disposición servicios gratuitos de asistencia lingüística. Llame al [phone].    We comply with applicable federal civil rights laws and [place] laws. We do not discriminate on the basis of race, color, national origin, age, disability, sex, sexual orientation, or gender identity.            Thank you!     Thank you for choosing [facility]  for your care. Our goal is always to provide you with excellent care. Hearing back from our patients is one way we can continue to improve our services. Please take a few minutes to complete the written survey that you may receive in the mail after your visit with us. Thank you!             Your Updated Medication List - Protect others around you: Learn how to safely use, store and throw away your medicines at www.disposemymeds.org.          This list is accurate as of 11/23/18  3:55 PM.  Always use your most recent med list.                   Brand Name Dispense Instructions for use Diagnosis    ARTIFICIAL TEARS OP      Apply to eye 4 times daily         ASPIRIN PO      Take 81 mg by mouth daily        calcium polycarbophil 625 MG tablet    FIBERCON     Take 1 tablet by mouth daily        hydrocortisone 1 % ointment      Apply topically 2 times daily        Hydrocortisone Acetate 1 % Oint      Externally apply topically 2 times daily        ipratropium 0.03 % spray    ATROVENT    30 mL    SPRAYS 2 SPRAYS INTO BOTH NOSTRILS EVERY 12 HOURS    Seasonal allergic rhinitis       levothyroxine 75 MCG tablet    SYNTHROID/LEVOTHROID    30 tablet    TAKE 1 TABLET BY MOUTH DAILY    Hypothyroidism       LOPRESSOR PO      Take 25 mg by mouth 2 times daily        polyethylene glycol powder    MIRALAX/GLYCOLAX    255 g    TAKE BY MOUTH 17G IN 8OZ WATER/JUICE DAILY    Constipation, unspecified constipation type       SENEXON-S 8.6-50 MG per tablet   Generic drug:  senna-docusate     100 tablet    TAKE 1 TABLET BY MOUTH TWICE A DAY FOR CONSTIPATION    Constipation, unspecified constipation type       traMADol 50 MG tablet    ULTRAM    30 tablet    Take 1/2 to 1 tablet by mouth every 6 hours as needed    Multiple joint pain       TYLENOL PO      Take 325 mg by mouth every 4 hours as needed for mild pain or fever        UNABLE TO FIND      Colostomy wafer change 2 times weekly on Tuesday and Friday        UNABLE TO FIND      MEDICATION NAME: LACRILUBE OPHTHALMIC 1/2 INCH THREAD DAILY        ZETIA 10 MG tablet   Generic drug:  ezetimibe     30 tablet    Take 1 tablet (10 mg) by mouth daily    Pure hypercholesterolemia

## 2018-11-26 NOTE — PROGRESS NOTES
HISTORY OF PRESENT ILLNESS:  Estela is a 89 year old female (10/17/1925)  resident of [facility] who is being seen today for a routine 30 day follow up. She has overall been doing well, no complaints of back pain or cough. Patient offers no other complaint.  Staff notes no other issues.    Current medications, allergies, and interdisciplinary care plan are reviewed.      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 (H) 01/01/2011     Priority: Medium     Kyphosis 01/01/2011     Priority: Medium     Primary osteoarthritis involving multiple joints 01/01/2011     Priority: Medium     Advanced care planning/counseling discussion 07/07/2010     Priority: Medium     Benign essential hypertension 08/14/2006     Priority: Medium     Hypothyroidism 12/11/2000     Priority: Medium          Social History     Social History     Marital status:      Spouse name: N/A     Number of children: N/A     Years of education: N/A     Occupational History      Homemaker     retired     Social History Main Topics     Smoking status: Former Smoker     Smokeless tobacco: Never Used      Comment: tried to quit yes, no passive exposure     Alcohol use No     Drug use: No     Sexual activity: Not on file     Other Topics Concern     Caffeine Concern Yes     one cup daily     Parent/Sibling W/ Cabg, Mi Or Angioplasty Before 65f 55m? No     Social History Narrative    Lives at [facility]         Current Outpatient Prescriptions   Medication Sig     Acetaminophen (TYLENOL PO) Take 325 mg by mouth every 4 hours as needed for mild pain or fever     ASPIRIN PO Take 81 mg by mouth daily     calcium polycarbophil (FIBERCON) 625 MG tablet Take 1 tablet by mouth daily     hydrocortisone 1 % ointment Apply topically 2 times daily     Hydrocortisone Acetate 1 % OINT Externally apply topically 2 times daily     Hypromellose (ARTIFICIAL TEARS OP) Apply to eye 4 times daily     ipratropium (ATROVENT) 0.03 % nasal spray SPRAYS 2 SPRAYS INTO BOTH NOSTRILS EVERY 12 HOURS     levothyroxine (SYNTHROID, LEVOTHROID) 75 MCG tablet TAKE 1 TABLET BY MOUTH DAILY     Metoprolol Tartrate (LOPRESSOR PO) Take 25 mg by mouth 2 times daily      polyethylene glycol (MIRALAX/GLYCOLAX) powder TAKE BY MOUTH 17G IN 8OZ WATER/JUICE DAILY     SENEXON-S 8.6-50 MG per tablet TAKE 1 TABLET BY MOUTH TWICE A DAY FOR CONSTIPATION     traMADol (ULTRAM) 50 MG tablet Take 1/2 to 1 tablet by mouth every 6 hours as needed     UNABLE TO FIND MEDICATION NAME: LACRILUBE OPHTHALMIC 1/2 INCH THREAD DAILY     UNABLE TO FIND Colostomy wafer change 2 times weekly on Tuesday and Friday     ZETIA 10 MG tablet Take 1 tablet (10 mg) by mouth daily     No current facility-administered medications for this visit.      Facility-Administered Medications Ordered in Other Visits   Medication     lidocaine 1% with EPINEPHrine 1:100,000 injection       Allergies   Allergen Reactions     Atorvastatin Calcium Other (See Comments)     Increased liver function test  Lipitor         I have reviewed the care plan and do agree with the plan.      ROS:  No chest pain, shortness of breath, fever, chills, headache, nausea, vomiting, dysuria, or changes in bowel habits.  Appetite is good.  No pain noted.          OBJECTIVE:  /60  Pulse 60  Temp 97.9  F (36.6  C)  Resp 16  Wt 131 lb 9.6 oz (59.7 kg)  SpO2 92%  BMI 26.58 kg/m2    GENERAL:  Chronically ill appearing,  alert, and in no acute distress  RESP:  Lungs clear.  No rales, rhonchi, or wheezing  CV:  RRR.  S1 S2 without murmur. No clicks or rubs.  SKIN:  Age-related changes.  No suspicious lesions or rashes.  PSYCH:  Mentation mildly confused, affect pleasant.  EXTREM:  Mild edema.          Lab/Diagnostic data:    Due in January      ASSESSMENT/PLAN:  1. Dementia without behavioral disturbance, unspecified dementia type  No changes to current care plan.    2. Congestive heart failure, unspecified HF chronicity, unspecified heart failure type (H)  As above.        Above issues stable.  No changes in current medications or care plan.      Total time spent with patient visit was 25 min including patient visit, review of pertinent clinical information, and treatment plan.      Pao Chau MD

## 2018-12-18 ENCOUNTER — NURSING HOME VISIT (OUTPATIENT)
Dept: FAMILY MEDICINE | Facility: OTHER | Age: 83
End: 2018-12-18
Payer: COMMERCIAL

## 2018-12-18 DIAGNOSIS — I10 BENIGN ESSENTIAL HYPERTENSION: ICD-10-CM

## 2018-12-18 DIAGNOSIS — M15.0 PRIMARY OSTEOARTHRITIS INVOLVING MULTIPLE JOINTS: ICD-10-CM

## 2018-12-18 DIAGNOSIS — E03.9 HYPOTHYROIDISM, UNSPECIFIED TYPE: ICD-10-CM

## 2018-12-18 DIAGNOSIS — I50.9 CONGESTIVE HEART FAILURE, UNSPECIFIED HF CHRONICITY, UNSPECIFIED HEART FAILURE TYPE (H): Primary | ICD-10-CM

## 2018-12-18 DIAGNOSIS — E78.00 PURE HYPERCHOLESTEROLEMIA: ICD-10-CM

## 2018-12-18 PROCEDURE — 99308 SBSQ NF CARE LOW MDM 20: CPT | Performed by: NURSE PRACTITIONER

## 2018-12-24 VITALS
HEART RATE: 70 BPM | SYSTOLIC BLOOD PRESSURE: 108 MMHG | DIASTOLIC BLOOD PRESSURE: 62 MMHG | RESPIRATION RATE: 18 BRPM | OXYGEN SATURATION: 93 % | TEMPERATURE: 98.1 F | WEIGHT: 132 LBS | BODY MASS INDEX: 26.66 KG/M2

## 2018-12-27 NOTE — PROGRESS NOTES
HISTORY OF PRESENT ILLNESS:  Estela is a 89 year old female (10/17/1925)  resident of [facility] who is being seen today for a routine 30 day follow up. Patient offers no other complaint.  Staff notes no other issues.    Current medications, allergies, and interdisciplinary care plan are reviewed.      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 (H) 01/01/2011     Priority: Medium     Kyphosis 01/01/2011     Priority: Medium     Primary osteoarthritis involving multiple joints 01/01/2011     Priority: Medium     Advanced care planning/counseling discussion 07/07/2010     Priority: Medium     Benign essential hypertension 08/14/2006     Priority: Medium     Hypothyroidism 12/11/2000     Priority: Medium          Social History     Socioeconomic History     Marital status:      Spouse name: Not on file     Number of children: Not on file     Years of education: Not on file     Highest education level: Not on file   Social Needs     Financial resource strain: Not on file     Food insecurity - worry: Not on file     Food insecurity - inability: Not on file     Transportation needs - medical: Not on file     Transportation needs - non-medical: Not on file   Occupational History     Employer: HOMEMAKER     Comment: retired   Tobacco Use     Smoking status: Former Smoker     Smokeless tobacco: Never Used     Tobacco comment: tried to quit yes, no passive exposure   Substance and Sexual Activity      Alcohol use: No     Drug use: No     Sexual activity: Not on file   Other Topics Concern      Service Not Asked     Blood Transfusions Not Asked     Caffeine Concern Yes     Comment: one cup daily     Occupational Exposure Not Asked     Hobby Hazards Not Asked     Sleep Concern Not Asked     Stress Concern Not Asked     Weight Concern Not Asked     Special Diet Not Asked     Back Care Not Asked     Exercise Not Asked     Bike Helmet Not Asked     Seat Belt Not Asked     Self-Exams Not Asked     Parent/sibling w/ CABG, MI or angioplasty before 65F 55M? No   Social History Narrative    Lives at [facility]        Current Outpatient Medications   Medication Sig     Acetaminophen (TYLENOL PO) Take 325 mg by mouth every 4 hours as needed for mild pain or fever     ASPIRIN PO Take 81 mg by mouth daily     calcium polycarbophil (FIBERCON) 625 MG tablet Take 1 tablet by mouth daily     Hydrocortisone Acetate 1 % OINT Externally apply topically 2 times daily     Hypromellose (ARTIFICIAL TEARS OP) Apply to eye 4 times daily     ipratropium (ATROVENT) 0.03 % nasal spray SPRAYS 2 SPRAYS INTO BOTH NOSTRILS EVERY 12 HOURS     levothyroxine (SYNTHROID, LEVOTHROID) 75 MCG tablet TAKE 1 TABLET BY MOUTH DAILY     Metoprolol Tartrate (LOPRESSOR PO) Take 25 mg by mouth 2 times daily      polyethylene glycol (MIRALAX/GLYCOLAX) powder TAKE BY MOUTH 17G IN 8OZ WATER/JUICE DAILY     SENEXON-S 8.6-50 MG per tablet TAKE 1 TABLET BY MOUTH TWICE A DAY FOR CONSTIPATION     traMADol (ULTRAM) 50 MG tablet Take 1/2 to 1 tablet by mouth every 6 hours as needed     UNABLE TO FIND MEDICATION NAME: LACRILUBE OPHTHALMIC 1/2 INCH THREAD DAILY     UNABLE TO FIND Colostomy wafer change 2 times weekly on Tuesday and Friday     ZETIA 10 MG tablet Take 1 tablet (10 mg) by mouth daily     No current facility-administered medications for this visit.      Facility-Administered Medications Ordered in Other Visits   Medication     lidocaine 1% with  EPINEPHrine 1:100,000 injection       Allergies   Allergen Reactions     Atorvastatin Calcium Other (See Comments)     Increased liver function test  Lipitor         I have reviewed the care plan and do agree with the plan.      ROS:  No chest pain, shortness of breath, fever, chills, headache, nausea, vomiting, dysuria, or changes in bowel habits.  Appetite is stable.  no pain noted.          OBJECTIVE:  /62   Pulse 70   Temp 98.1  F (36.7  C)   Resp 18   Wt 59.9 kg (132 lb)   SpO2 93%   BMI 26.66 kg/m      GENERAL:  Chronically ill appearing, alert, and in no acute distress  RESP:  Lungs clear.  No rales, rhonchi, or wheezing  CV:  RRR.  S1 S2 with out murmur. No clicks or rubs.  SKIN:  Age-related changes.  No suspicious lesions or rashes.  PSYCH:  Mentation mildly confused, affect pleasant  EXTREM:  mild edema.           Lab/Diagnostic data:    Due in January      ASSESSMENT/PLAN:  1. Congestive heart failure, unspecified HF chronicity, unspecified heart failure type (H)    2. Benign essential hypertension    3. Primary osteoarthritis involving multiple joints    4. Pure hypercholesterolemia    5. Hypothyroidism, unspecified type        Above issues stable.  No changes in current medications or care plan.      Total time spent with patient visit was 25 min including patient visit, review of pertinent clinical information, and treatment plan.      Taylor Barber NP

## 2019-01-01 ENCOUNTER — NURSING HOME VISIT (OUTPATIENT)
Dept: FAMILY MEDICINE | Facility: OTHER | Age: 84
End: 2019-01-01
Payer: COMMERCIAL

## 2019-01-01 ENCOUNTER — NURSING HOME VISIT (OUTPATIENT)
Dept: FAMILY MEDICINE | Facility: OTHER | Age: 84
End: 2019-01-01
Attending: NURSE PRACTITIONER
Payer: COMMERCIAL

## 2019-01-01 VITALS
BODY MASS INDEX: 27.35 KG/M2 | TEMPERATURE: 97.2 F | RESPIRATION RATE: 16 BRPM | WEIGHT: 135.4 LBS | DIASTOLIC BLOOD PRESSURE: 64 MMHG | HEART RATE: 64 BPM | SYSTOLIC BLOOD PRESSURE: 136 MMHG | OXYGEN SATURATION: 93 %

## 2019-01-01 VITALS
OXYGEN SATURATION: 94 % | BODY MASS INDEX: 26.58 KG/M2 | TEMPERATURE: 97 F | RESPIRATION RATE: 18 BRPM | DIASTOLIC BLOOD PRESSURE: 70 MMHG | HEART RATE: 66 BPM | SYSTOLIC BLOOD PRESSURE: 128 MMHG | WEIGHT: 131.6 LBS

## 2019-01-01 VITALS
RESPIRATION RATE: 20 BRPM | BODY MASS INDEX: 26.94 KG/M2 | OXYGEN SATURATION: 91 % | SYSTOLIC BLOOD PRESSURE: 98 MMHG | WEIGHT: 133.4 LBS | HEART RATE: 60 BPM | DIASTOLIC BLOOD PRESSURE: 53 MMHG | TEMPERATURE: 97.8 F

## 2019-01-01 DIAGNOSIS — E03.9 HYPOTHYROIDISM, UNSPECIFIED TYPE: ICD-10-CM

## 2019-01-01 DIAGNOSIS — F03.90 DEMENTIA WITHOUT BEHAVIORAL DISTURBANCE, UNSPECIFIED DEMENTIA TYPE: Primary | ICD-10-CM

## 2019-01-01 DIAGNOSIS — E78.00 PURE HYPERCHOLESTEROLEMIA: ICD-10-CM

## 2019-01-01 DIAGNOSIS — I10 BENIGN ESSENTIAL HYPERTENSION: ICD-10-CM

## 2019-01-01 DIAGNOSIS — I50.9 CONGESTIVE HEART FAILURE, UNSPECIFIED HF CHRONICITY, UNSPECIFIED HEART FAILURE TYPE (H): ICD-10-CM

## 2019-01-01 DIAGNOSIS — I25.119 ATHEROSCLEROSIS OF NATIVE CORONARY ARTERY OF NATIVE HEART WITH ANGINA PECTORIS (H): ICD-10-CM

## 2019-01-01 PROCEDURE — 99308 SBSQ NF CARE LOW MDM 20: CPT | Performed by: NURSE PRACTITIONER

## 2019-01-01 PROCEDURE — 99308 SBSQ NF CARE LOW MDM 20: CPT | Performed by: FAMILY MEDICINE

## 2019-01-01 ASSESSMENT — PAIN SCALES - GENERAL: PAINLEVEL: NO PAIN (0)

## 2019-01-16 ENCOUNTER — TRANSFERRED RECORDS (OUTPATIENT)
Dept: HEALTH INFORMATION MANAGEMENT | Facility: CLINIC | Age: 84
End: 2019-01-16

## 2019-01-17 ENCOUNTER — TELEPHONE (OUTPATIENT)
Dept: FAMILY MEDICINE | Facility: OTHER | Age: 84
End: 2019-01-17

## 2019-01-17 NOTE — TELEPHONE ENCOUNTER
Left message at [facility] negative urine and faxed of labs   Pamela M Lechevalier LPN

## 2019-01-23 ENCOUNTER — NURSING HOME VISIT (OUTPATIENT)
Dept: FAMILY MEDICINE | Facility: OTHER | Age: 84
End: 2019-01-23
Payer: COMMERCIAL

## 2019-01-23 DIAGNOSIS — E78.00 PURE HYPERCHOLESTEROLEMIA: ICD-10-CM

## 2019-01-23 DIAGNOSIS — I50.9 CONGESTIVE HEART FAILURE, UNSPECIFIED HF CHRONICITY, UNSPECIFIED HEART FAILURE TYPE (H): ICD-10-CM

## 2019-01-23 DIAGNOSIS — F03.90 DEMENTIA WITHOUT BEHAVIORAL DISTURBANCE, UNSPECIFIED DEMENTIA TYPE: Primary | ICD-10-CM

## 2019-01-23 DIAGNOSIS — E03.9 HYPOTHYROIDISM, UNSPECIFIED TYPE: ICD-10-CM

## 2019-01-23 DIAGNOSIS — I10 BENIGN ESSENTIAL HYPERTENSION: ICD-10-CM

## 2019-01-23 PROCEDURE — 99308 SBSQ NF CARE LOW MDM 20: CPT | Performed by: FAMILY MEDICINE

## 2019-01-23 NOTE — Clinical Note
[facility] patientPlease [facility] labs [facility] for TSH (hypothyroidism), CMP (HTN and hyperlipidemia) and lipid profile (hyperlipidemia)

## 2019-01-25 VITALS
OXYGEN SATURATION: 96 % | TEMPERATURE: 97.9 F | SYSTOLIC BLOOD PRESSURE: 160 MMHG | RESPIRATION RATE: 18 BRPM | DIASTOLIC BLOOD PRESSURE: 70 MMHG | HEART RATE: 69 BPM

## 2019-01-31 NOTE — PROGRESS NOTES
HISTORY OF PRESENT ILLNESS:  Estela is a 89 year old female (10/17/1925)  resident of [facility] who is being seen today for a routine 30 day follow up. She has overall been doing well. Patient offers no other complaint.  Staff notes no other issues.    Current medications, allergies, and interdisciplinary care plan are reviewed.      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 (H) 01/01/2011     Priority: Medium     Kyphosis 01/01/2011     Priority: Medium     Primary osteoarthritis involving multiple joints 01/01/2011     Priority: Medium     Advanced care planning/counseling discussion 07/07/2010     Priority: Medium     Benign essential hypertension 08/14/2006     Priority: Medium     Hypothyroidism 12/11/2000     Priority: Medium          Social History     Socioeconomic History     Marital status:      Spouse name: Not on file     Number of children: Not on file     Years of education: Not on file     Highest education level: Not on file   Social Needs     Financial resource strain: Not on file     Food insecurity - worry: Not on file     Food insecurity - inability: Not on file     Transportation needs - medical: Not on file     Transportation needs - non-medical: Not on file   Occupational History     Employer: HOMEMAKER     Comment: retired   Tobacco Use     Smoking status: Former Smoker     Smokeless tobacco: Never Used     Tobacco comment: tried to quit yes, no  passive exposure   Substance and Sexual Activity     Alcohol use: No     Drug use: No     Sexual activity: Not on file   Other Topics Concern      Service Not Asked     Blood Transfusions Not Asked     Caffeine Concern Yes     Comment: one cup daily     Occupational Exposure Not Asked     Hobby Hazards Not Asked     Sleep Concern Not Asked     Stress Concern Not Asked     Weight Concern Not Asked     Special Diet Not Asked     Back Care Not Asked     Exercise Not Asked     Bike Helmet Not Asked     Seat Belt Not Asked     Self-Exams Not Asked     Parent/sibling w/ CABG, MI or angioplasty before 65F 55M? No   Social History Narrative    Lives at [facility]        Current Outpatient Medications   Medication Sig     Acetaminophen (TYLENOL PO) Take 325 mg by mouth every 4 hours as needed for mild pain or fever     ASPIRIN PO Take 81 mg by mouth daily     calcium polycarbophil (FIBERCON) 625 MG tablet Take 1 tablet by mouth daily     Hydrocortisone Acetate 1 % OINT Externally apply topically 2 times daily     Hypromellose (ARTIFICIAL TEARS OP) Apply to eye 4 times daily     ipratropium (ATROVENT) 0.03 % nasal spray SPRAYS 2 SPRAYS INTO BOTH NOSTRILS EVERY 12 HOURS     levothyroxine (SYNTHROID, LEVOTHROID) 75 MCG tablet TAKE 1 TABLET BY MOUTH DAILY     Metoprolol Tartrate (LOPRESSOR PO) Take 25 mg by mouth 2 times daily      polyethylene glycol (MIRALAX/GLYCOLAX) powder TAKE BY MOUTH 17G IN 8OZ WATER/JUICE DAILY     SENEXON-S 8.6-50 MG per tablet TAKE 1 TABLET BY MOUTH TWICE A DAY FOR CONSTIPATION     traMADol (ULTRAM) 50 MG tablet Take 1/2 to 1 tablet by mouth every 6 hours as needed     UNABLE TO FIND MEDICATION NAME: LACRILUBE OPHTHALMIC 1/2 INCH THREAD DAILY     UNABLE TO FIND Colostomy wafer change 2 times weekly on Tuesday and Friday     ZETIA 10 MG tablet Take 1 tablet (10 mg) by mouth daily     No current facility-administered medications for this visit.      Facility-Administered Medications  Ordered in Other Visits   Medication     lidocaine 1% with EPINEPHrine 1:100,000 injection       Allergies   Allergen Reactions     Atorvastatin Calcium Other (See Comments)     Increased liver function test  Lipitor         I have reviewed the care plan and do agree with the plan.      ROS:  No chest pain, shortness of breath, fever, chills, headache, nausea, vomiting, dysuria, or changes in bowel habits.  Appetite is good.  No pain noted.          OBJECTIVE:  /70   Pulse 69   Temp 97.9  F (36.6  C)   Resp 18   SpO2 96%     GENERAL:  Chronically ill appearing, alert, and in no acute distress  RESP:  Lungs clear.  No rales, rhonchi, or wheezing  CV:  RRR.  S1 S2 without murmur. No clicks or rubs.  SKIN:  Age-related changes.  No suspicious lesions or rashes.  PSYCH:  Mentation mildly confused, affect pleasant.  EXTREM:  Mild chronic edema.          Lab/Diagnostic data:    Due for TSH, lipid, CMP - orders will be sent      ASSESSMENT/PLAN:  1. Dementia without behavioral disturbance, unspecified dementia type  No changes to current care plan    2. Benign essential hypertension  Labs will be ordered    3. Pure hypercholesterolemia  Labs will be ordered    4. Hypothyroidism, unspecified type  Labs will be ordered    5. Congestive heart failure, unspecified HF chronicity, unspecified heart failure type (H)  No changes to current care plan        Above issues stable.  No changes in current medications or care plan.      Total time spent with patient visit was 25 min including patient visit, review of pertinent clinical information, and treatment plan.      Pao Chau MD

## 2019-02-07 ENCOUNTER — TELEPHONE (OUTPATIENT)
Dept: FAMILY MEDICINE | Facility: OTHER | Age: 84
End: 2019-02-07

## 2019-02-07 ENCOUNTER — TRANSFERRED RECORDS (OUTPATIENT)
Dept: HEALTH INFORMATION MANAGEMENT | Facility: CLINIC | Age: 84
End: 2019-02-07

## 2019-02-07 NOTE — TELEPHONE ENCOUNTER
Saline from the nursing home ([phone]) called and pt has had a cough for the last few weeks with a low grade temp off and on.  Pts lungs now do not sound clear, cough is still persistant.  Would like a chest x-ray.

## 2019-02-27 ENCOUNTER — NURSING HOME VISIT (OUTPATIENT)
Dept: FAMILY MEDICINE | Facility: OTHER | Age: 84
End: 2019-02-27
Payer: COMMERCIAL

## 2019-02-27 DIAGNOSIS — J40 BRONCHITIS: ICD-10-CM

## 2019-02-27 DIAGNOSIS — I10 BENIGN ESSENTIAL HYPERTENSION: ICD-10-CM

## 2019-02-27 DIAGNOSIS — F03.90 DEMENTIA WITHOUT BEHAVIORAL DISTURBANCE, UNSPECIFIED DEMENTIA TYPE: ICD-10-CM

## 2019-02-27 DIAGNOSIS — E03.9 HYPOTHYROIDISM, UNSPECIFIED TYPE: ICD-10-CM

## 2019-02-27 DIAGNOSIS — I50.9 CONGESTIVE HEART FAILURE, UNSPECIFIED HF CHRONICITY, UNSPECIFIED HEART FAILURE TYPE (H): Primary | ICD-10-CM

## 2019-02-27 PROCEDURE — 99308 SBSQ NF CARE LOW MDM 20: CPT | Performed by: NURSE PRACTITIONER

## 2019-03-01 VITALS
WEIGHT: 129.4 LBS | RESPIRATION RATE: 18 BRPM | HEART RATE: 68 BPM | TEMPERATURE: 97.2 F | SYSTOLIC BLOOD PRESSURE: 120 MMHG | BODY MASS INDEX: 26.14 KG/M2 | DIASTOLIC BLOOD PRESSURE: 62 MMHG | OXYGEN SATURATION: 92 %

## 2019-03-01 RX ORDER — DIPHENHYDRAMINE HCL 25 MG
25 CAPSULE ORAL EVERY 6 HOURS PRN
COMMUNITY

## 2019-03-01 RX ORDER — GUAIFENESIN 200 MG/10ML
100 LIQUID ORAL EVERY 6 HOURS PRN
COMMUNITY

## 2019-03-04 NOTE — PROGRESS NOTES
HISTORY OF PRESENT ILLNESS:  Estela is a 89 year old female (10/17/1925)  resident of [facility] who is being seen today for a routine 30 day follow up. Patient offers no other complaint.  Staff notes she was started on azithromycin for bronchitis - doing much better..  Requesting to stop ipratropium.      Current medications, allergies, and interdisciplinary care plan are reviewed.      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 (H) 01/01/2011     Priority: Medium     Kyphosis 01/01/2011     Priority: Medium     Primary osteoarthritis involving multiple joints 01/01/2011     Priority: Medium     Advanced care planning/counseling discussion 07/07/2010     Priority: Medium     Benign essential hypertension 08/14/2006     Priority: Medium     Hypothyroidism 12/11/2000     Priority: Medium          Social History     Socioeconomic History     Marital status:      Spouse name: Not on file     Number of children: Not on file     Years of education: Not on file     Highest education level: Not on file   Occupational History     Employer: HOMEMAKER     Comment: retired   Social Needs     Financial resource strain: Not on file     Food insecurity:     Worry: Not on file     Inability: Not on file     Transportation needs:     Medical: Not on file     Non-medical: Not on file   Tobacco Use     Smoking status: Former Smoker     Smokeless tobacco: Never Used     Tobacco comment: tried to quit yes, no  passive exposure   Substance and Sexual Activity     Alcohol use: No     Drug use: No     Sexual activity: Not on file   Lifestyle     Physical activity:     Days per week: Not on file     Minutes per session: Not on file     Stress: Not on file   Relationships     Social connections:     Talks on phone: Not on file     Gets together: Not on file     Attends Yarsani service: Not on file     Active member of club or organization: Not on file     Attends meetings of clubs or organizations: Not on file     Relationship status: Not on file     Intimate partner violence:     Fear of current or ex partner: Not on file     Emotionally abused: Not on file     Physically abused: Not on file     Forced sexual activity: Not on file   Other Topics Concern      Service Not Asked     Blood Transfusions Not Asked     Caffeine Concern Yes     Comment: one cup daily     Occupational Exposure Not Asked     Hobby Hazards Not Asked     Sleep Concern Not Asked     Stress Concern Not Asked     Weight Concern Not Asked     Special Diet Not Asked     Back Care Not Asked     Exercise Not Asked     Bike Helmet Not Asked     Seat Belt Not Asked     Self-Exams Not Asked     Parent/sibling w/ CABG, MI or angioplasty before 65F 55M? No   Social History Narrative    Lives at [facility]        Current Outpatient Medications   Medication Sig     Acetaminophen (TYLENOL PO) Take 325 mg by mouth every 4 hours as needed for mild pain or fever     ASPIRIN PO Take 81 mg by mouth daily     calcium polycarbophil (FIBERCON) 625 MG tablet Take 1 tablet by mouth daily     diphenhydrAMINE (BENADRYL) 25 MG capsule Take 25 mg by mouth every 6 hours as needed for itching or allergies     guaiFENesin (ROBITUSSIN) 100 MG/5ML liquid Take 200 mg by mouth every 4 hours as needed for cough     Hydrocortisone Acetate 1 % OINT Externally apply topically 2 times daily     levothyroxine (SYNTHROID, LEVOTHROID) 75 MCG tablet TAKE 1 TABLET BY MOUTH DAILY      Metoprolol Tartrate (LOPRESSOR PO) Take 25 mg by mouth 2 times daily      polyethylene glycol (MIRALAX/GLYCOLAX) powder TAKE BY MOUTH 17G IN 8OZ WATER/JUICE DAILY     polyethylene glycol 0.4%- propylene glycol 0.3% (SYSTANE ULTRA) 0.4-0.3 % SOLN ophthalmic solution Place 1 drop into both eyes 4 times daily as needed for dry eyes     SENEXON-S 8.6-50 MG per tablet TAKE 1 TABLET BY MOUTH TWICE A DAY FOR CONSTIPATION     traMADol (ULTRAM) 50 MG tablet Take 1/2 to 1 tablet by mouth every 6 hours as needed     UNABLE TO FIND MEDICATION NAME: LACRILUBE OPHTHALMIC 1/2 INCH THREAD DAILY     UNABLE TO FIND Colostomy wafer change 2 times weekly on Tuesday and Friday     ZETIA 10 MG tablet Take 1 tablet (10 mg) by mouth daily     No current facility-administered medications for this visit.      Facility-Administered Medications Ordered in Other Visits   Medication     lidocaine 1% with EPINEPHrine 1:100,000 injection       Allergies   Allergen Reactions     Atorvastatin Calcium Other (See Comments)     Increased liver function test  Lipitor         I have reviewed the care plan and do agree with the plan.      ROS:  No chest pain, shortness of breath, fever, chills, headache, nausea, vomiting, dysuria, or changes in bowel habits.  Appetite is good.  no pain noted.          OBJECTIVE:  /62   Pulse 68   Temp 97.2  F (36.2  C)   Resp 18   Wt 58.7 kg (129 lb 6.4 oz)   SpO2 92%   BMI 26.14 kg/m      GENERAL:  Chronically ill appearing, alert, and in no acute distress  RESP:  Lungs clear.  No rales, rhonchi, or wheezing  CV:  RRR.  S1 S2 with out murmur. No clicks or rubs.  SKIN:  Age-related changes.  No suspicious lesions or rashes.  PSYCH:  Pleasant affect  EXTREM:  trace edema.  Pulses intact.          Lab/Diagnostic data:        ASSESSMENT/PLAN:  1. Congestive heart failure, unspecified HF chronicity, unspecified heart failure type (H)      2. Benign essential hypertension      3. Hypothyroidism, unspecified  type      4. Dementia without behavioral disturbance, unspecified dementia type      5. Bronchitis          Above issues stable.  No changes in current medications or care plan.      Total time spent with patient visit was 25 min including patient visit, review of pertinent clinical information, and treatment plan.      Taylor Barber NP

## 2019-03-27 ENCOUNTER — TELEPHONE (OUTPATIENT)
Dept: FAMILY MEDICINE | Facility: OTHER | Age: 84
End: 2019-03-27

## 2019-03-27 ENCOUNTER — NURSING HOME VISIT (OUTPATIENT)
Dept: FAMILY MEDICINE | Facility: OTHER | Age: 84
End: 2019-03-27
Payer: COMMERCIAL

## 2019-03-27 VITALS
HEART RATE: 58 BPM | DIASTOLIC BLOOD PRESSURE: 52 MMHG | BODY MASS INDEX: 25.81 KG/M2 | TEMPERATURE: 97.3 F | SYSTOLIC BLOOD PRESSURE: 102 MMHG | WEIGHT: 127.8 LBS | RESPIRATION RATE: 22 BRPM | OXYGEN SATURATION: 95 %

## 2019-03-27 DIAGNOSIS — I50.9 CONGESTIVE HEART FAILURE, UNSPECIFIED HF CHRONICITY, UNSPECIFIED HEART FAILURE TYPE (H): ICD-10-CM

## 2019-03-27 DIAGNOSIS — E03.9 HYPOTHYROIDISM, UNSPECIFIED TYPE: ICD-10-CM

## 2019-03-27 DIAGNOSIS — F03.90 DEMENTIA WITHOUT BEHAVIORAL DISTURBANCE, UNSPECIFIED DEMENTIA TYPE: Primary | ICD-10-CM

## 2019-03-27 DIAGNOSIS — I10 BENIGN ESSENTIAL HYPERTENSION: ICD-10-CM

## 2019-03-27 DIAGNOSIS — E78.00 PURE HYPERCHOLESTEROLEMIA: ICD-10-CM

## 2019-03-27 PROCEDURE — 99308 SBSQ NF CARE LOW MDM 20: CPT | Performed by: FAMILY MEDICINE

## 2019-03-27 NOTE — TELEPHONE ENCOUNTER
Deedee called back and patient needs a bigger wafer for her ostomy bag.Upddated Deedee we will need to know current size she is using at this time. She will call back.

## 2019-03-27 NOTE — TELEPHONE ENCOUNTER
2:05 PM    Reason for Call: Phone Call    Description:Deedee from [facility] called and she is wanting Nery to give her a phone call back in regards to patient.   Please contact at [phone]     Was an appointment offered for this call? No  If yes : Appointment type              Date    Preferred method for responding to this message: Telephone Call  What is your phone number ?[phone]  If we cannot reach you directly, may we leave a detailed response at the number you provided? Yes    Can this message wait until your PCP/provider returns, if available today? Not applicable    Rasheeda Hall MA

## 2019-03-28 ENCOUNTER — TELEPHONE (OUTPATIENT)
Dept: WOUND CARE | Facility: HOSPITAL | Age: 84
End: 2019-03-28

## 2019-03-28 DIAGNOSIS — Z93.3 COLOSTOMY IN PLACE (H): Primary | ICD-10-CM

## 2019-03-28 NOTE — TELEPHONE ENCOUNTER
Deedee called today to tell me the same information as in the note below.  The size wafer she has now is the biggest one of that make.  She is waiting for [facility] to help her with information to a company with bigger sizing.  She did go to wound care.  Since she is not a [facility] patient she will probably end up going to the wound care in [facility].  If any questions call her, Alejandrina at [phone]  Vida, the nurse at [facility] is aware of the situation.

## 2019-03-28 NOTE — TELEPHONE ENCOUNTER
Deedee from [facility] called to get advice on ordered new ostomy products for this patient.  Apparently the NH is using Convatech [id number] and [id number] ostomy products but her stoma has gotten so big that they are cutting to the edge of the barrier.  I did check the [employer] product guide and there is not a bigger option in that style.  Since we are not active with this patient I cannot make any recommendations.  I gave Deedee the phone number for [employer] and encouraged the NH to contact them for advice.

## 2019-03-28 NOTE — TELEPHONE ENCOUNTER
Called Jenny at [facility]. Wafer is too small and does leak. OK for [facility] referral for colostomy supplies/fitting? Thank you.

## 2019-03-28 NOTE — PROGRESS NOTES
"    HISTORY OF PRESENT ILLNESS:  Estela is a 89 year old female (10/17/1925)  resident of [facility] who is being seen today for a routine 30 day follow up. Staff do have a few concerns.  Patient does need a larger wafer for her colostomy due to her unusual ostomy site.  She has had an intermittent \"wet cough\" but has not had fevers or other symptoms.  Insurance no longer covers eye ointment, patient does use artificial tears.  There were also questions regarding Miralax and Tylenol. Patient offers no other complaint.    Current medications, allergies, and interdisciplinary care plan are reviewed.      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 (H) 01/01/2011     Priority: Medium     Kyphosis 01/01/2011     Priority: Medium     Primary osteoarthritis involving multiple joints 01/01/2011     Priority: Medium     Advanced care planning/counseling discussion 07/07/2010     Priority: Medium     Benign essential hypertension 08/14/2006     Priority: Medium     Hypothyroidism 12/11/2000     Priority: Medium          Social History     Socioeconomic History     Marital status:      Spouse name: Not on file     Number of children: Not on file     Years of education: Not on file     Highest education level: Not on file   Occupational History     Employer: [employer]OMEM[employer]ER     Comment: retired   Social Needs     Financial resource strain: Not on file     Food insecurity:     Worry: Not on " file     Inability: Not on file     Transportation needs:     Medical: Not on file     Non-medical: Not on file   Tobacco Use     Smoking status: Former Smoker     Smokeless tobacco: Never Used     Tobacco comment: tried to quit yes, no passive exposure   Substance and Sexual Activity     Alcohol use: No     Drug use: No     Sexual activity: Not on file   Lifestyle     Physical activity:     Days per week: Not on file     Minutes per session: Not on file     Stress: Not on file   Relationships     Social connections:     Talks on phone: Not on file     Gets together: Not on file     Attends Oriental orthodox service: Not on file     Active member of club or organization: Not on file     Attends meetings of clubs or organizations: Not on file     Relationship status: Not on file     Intimate partner violence:     Fear of current or ex partner: Not on file     Emotionally abused: Not on file     Physically abused: Not on file     Forced sexual activity: Not on file   Other Topics Concern      Service Not Asked     Blood Transfusions Not Asked     Caffeine Concern Yes     Comment: one cup daily     Occupational Exposure Not Asked     Hobby Hazards Not Asked     Sleep Concern Not Asked     Stress Concern Not Asked     Weight Concern Not Asked     Special Diet Not Asked     Back Care Not Asked     Exercise Not Asked     Bike Helmet Not Asked     Seat Belt Not Asked     Self-Exams Not Asked     Parent/sibling w/ CABG, MI or angioplasty before 65F 55M? No   Social History Narrative    Lives at [facility]        Current Outpatient Medications   Medication Sig     Acetaminophen (TYLENOL PO) Take 325 mg by mouth every 4 hours as needed for mild pain or fever     ASPIRIN PO Take 81 mg by mouth daily     calcium polycarbophil (FIBERCON) 625 MG tablet Take 1 tablet by mouth daily     diphenhydrAMINE (BENADRYL) 25 MG capsule Take 25 mg by mouth every 6 hours as needed for itching or allergies     guaiFENesin (ROBITUSSIN)  100 MG/5ML liquid Take 200 mg by mouth every 4 hours as needed for cough     Hydrocortisone Acetate 1 % OINT Externally apply topically 2 times daily     levothyroxine (SYNTHROID, LEVOTHROID) 75 MCG tablet TAKE 1 TABLET BY MOUTH DAILY     Metoprolol Tartrate (LOPRESSOR PO) Take 25 mg by mouth 2 times daily      polyethylene glycol (MIRALAX/GLYCOLAX) powder TAKE BY MOUTH 17G IN 8OZ WATER/JUICE DAILY     polyethylene glycol 0.4%- propylene glycol 0.3% (SYSTANE ULTRA) 0.4-0.3 % SOLN ophthalmic solution Place 1 drop into both eyes 4 times daily as needed for dry eyes     SENEXON-S 8.6-50 MG per tablet TAKE 1 TABLET BY MOUTH TWICE A DAY FOR CONSTIPATION     traMADol (ULTRAM) 50 MG tablet Take 1/2 to 1 tablet by mouth every 6 hours as needed     UNABLE TO FIND MEDICATION NAME: LACRILUBE OPHTHALMIC 1/2 INCH THREAD DAILY     UNABLE TO FIND Colostomy wafer change 2 times weekly on Tuesday and Friday     ZETIA 10 MG tablet Take 1 tablet (10 mg) by mouth daily     No current facility-administered medications for this visit.      Facility-Administered Medications Ordered in Other Visits   Medication     lidocaine 1% with EPINEPHrine 1:100,000 injection       Allergies   Allergen Reactions     Atorvastatin Calcium Other (See Comments)     Increased liver function test  Lipitor         I have reviewed the care plan and do agree with the plan.      ROS:  No chest pain, shortness of breath, fever, chills, headache, nausea, vomiting, dysuria, or changes in bowel habits.  Appetite is good.  No pain noted.          OBJECTIVE:  /52   Pulse 58   Temp 97.3  F (36.3  C) (Tympanic)   Resp 22   Wt 58 kg (127 lb 12.8 oz)   SpO2 95%   BMI 25.81 kg/m      GENERAL:  Chronically ill appearing, alert, and in no acute distress  RESP:  Lungs clear.  No rales, rhonchi, or wheezing  CV:  RRR.  S1 S2 without murmur. No clicks or rubs.  SKIN:  Age-related changes.  No suspicious lesions or rashes.  PSYCH:  Mentation mildly confused, affect  pleasant.  EXTREM:  Mild chronic edema.          Lab/Diagnostic data:    CMP and CBC completed in February.  Patient due for TSH      ASSESSMENT/PLAN:  1. Dementia without behavioral disturbance, unspecified dementia type  No changes to current care plans    2. Congestive heart failure, unspecified HF chronicity, unspecified heart failure type (H)  No changes    3. Benign essential hypertension  No changes    4. Pure hypercholesterolemia  No changes    5. Hypothyroidism, unspecified type  TSH ordered.        Above issues stable.  No changes in current medications or care plan.      Total time spent with patient visit was 25 min including patient visit, review of pertinent clinical information, and treatment plan.      Pao Chau MD

## 2019-03-29 ENCOUNTER — MEDICAL CORRESPONDENCE (OUTPATIENT)
Dept: HEALTH INFORMATION MANAGEMENT | Facility: CLINIC | Age: 84
End: 2019-03-29

## 2019-04-02 ENCOUNTER — TRANSFERRED RECORDS (OUTPATIENT)
Dept: HEALTH INFORMATION MANAGEMENT | Facility: CLINIC | Age: 84
End: 2019-04-02

## 2019-04-02 DIAGNOSIS — Z43.3 COLOSTOMY CARE (H): Primary | ICD-10-CM

## 2019-04-02 LAB — TSH SERPL-ACNC: 0.46 UIU/ML (ref 0.4–3.99)

## 2019-04-03 ENCOUNTER — TELEPHONE (OUTPATIENT)
Dept: WOUND CARE | Facility: HOSPITAL | Age: 84
End: 2019-04-03

## 2019-04-03 NOTE — TELEPHONE ENCOUNTER
"Estela's daughter Baldomero called wondering why Estela has to come to wound/ostomy clinic. I explained previous messages that her stoma has increased in size and the pouch and wafer they are using are too small and that product doesn't come in larger sizes. I explained that they are limited with options at nursing home and in the clinic we have specially trained nurses who can order different products that fit her better. Baldomero said \"if they just use the barrier cream like they are suppose to she wouldn't be leaking.\" Baldomero said she is in [place] for another month and her mother might just have to wait another month. I again emphasized the importance of getting her into the clinic where the nurses can assess and fit her to the correct product. Baldomero will call the nursing home tomorrow to talk with the nurses there.   "

## 2019-04-15 ENCOUNTER — TELEPHONE (OUTPATIENT)
Dept: FAMILY MEDICINE | Facility: OTHER | Age: 84
End: 2019-04-15

## 2019-04-15 DIAGNOSIS — Z43.3 COLOSTOMY CARE (H): Primary | ICD-10-CM

## 2019-04-15 NOTE — TELEPHONE ENCOUNTER
Lavinia from wound care called this writer requesting referral for patient for colostomy cares as patient is scheduled for next week with wound care.     Referral pended and routed provider.     Katy Arroyo LPN on 4/15/2019 at 4:02 PM

## 2019-04-18 ENCOUNTER — OFFICE VISIT (OUTPATIENT)
Dept: WOUND CARE | Facility: OTHER | Age: 84
End: 2019-04-18
Attending: NURSE PRACTITIONER
Payer: COMMERCIAL

## 2019-04-18 VITALS
TEMPERATURE: 96.8 F | HEART RATE: 59 BPM | RESPIRATION RATE: 16 BRPM | SYSTOLIC BLOOD PRESSURE: 125 MMHG | DIASTOLIC BLOOD PRESSURE: 66 MMHG

## 2019-04-18 DIAGNOSIS — Z43.3 COLOSTOMY CARE (H): ICD-10-CM

## 2019-04-18 PROCEDURE — G0463 HOSPITAL OUTPT CLINIC VISIT: HCPCS

## 2019-04-18 PROCEDURE — 99214 OFFICE O/P EST MOD 30 MIN: CPT | Performed by: NURSE PRACTITIONER

## 2019-04-18 PROCEDURE — G0463 HOSPITAL OUTPT CLINIC VISIT: HCPCS | Mod: 25

## 2019-04-18 ASSESSMENT — PAIN SCALES - GENERAL: PAINLEVEL: NO PAIN (0)

## 2019-04-18 NOTE — NURSING NOTE
"Chief Complaint   Patient presents with     WOUND CARE       Initial /66 (BP Location: Left arm, Patient Position: Sitting, Cuff Size: Adult Regular)   Pulse 59   Temp 96.8  F (36  C) (Tympanic)   Resp 16  Estimated body mass index is 25.81 kg/m  as calculated from the following:    Height as of 12/13/16: 1.499 m (4' 11\").    Weight as of 3/27/19: 58 kg (127 lb 12.8 oz).  Medication Reconciliation: complete    Zelda Shah LPN  "

## 2019-04-18 NOTE — PROGRESS NOTES
"SUBJECTIVE:  Estela Barber, 89 year old, female presents with the following Chief Complaint(s) with HPI to follow:   Chief Complaint   Patient presents with     WOUND CARE      HPI:  Patient is here for evaluation and treatment of her colostomy.  She is accompanied by one of the nurses at her facility ([facility]).  The nurse answers most questions for her,due to memory issues, but she can tell me if she is in pain, or having trouble breathing, etc....  Staff at her facility have noticed over the past months that her pouching system is more difficult to maintain.  Sometimes it will remain on for a week, and sometimes it has to be changed daily.  They report an \"abnormal\" stoma.  They are concerned about how large the stoma is, and they are cutting to the far edge of the current wafer to get a hole large enough for her stoma.  They also note some peristomal skin irritation.    This colostomy was created in 2011, I do not have information available regarding the reason for the stoma creation.        Patient Active Problem List   Diagnosis     Advanced care planning/counseling discussion     Facial skin lesion     Benign essential hypertension     Atherosclerosis of native coronary artery of native heart with angina pectoris (H)     CHF (congestive heart failure) (H)     Kyphosis     Primary osteoarthritis involving multiple joints     Hypothyroidism     Advanced directives, counseling/discussion     Femur fracture, left (H)     Pure hypercholesterolemia     Dementia without behavioral disturbance, unspecified dementia type       Past Medical History:   Diagnosis Date     Arthritis      Backache, unspecified 03/11/2002     CAD (coronary artery disease) 01/01/2011    NSTEMI 12/21/2005, stent to D1, no other obstructive lesions, EF reported as normal     CHF (congestive heart failure) (H)      Hypercholesterolemia 12/30/1999     Hypertension 08/14/2006     Hypothyroidism 12/11/2000     Kyphosis 01/01/2011     Lumbar " compression fracture 01/01/2011     Lung nodules 01/01/2012     Osteoarthritis 01/01/2011     Osteoporosis 10/07/2011     Thyroid disease        Past Surgical History:   Procedure Laterality Date     ANGIOGRAM  1/2006     BLADDER SURGERY       cataract  2001    RT     cataract  2000    LT     COLONOSCOPY       EXCISE LESION FACE WITH FLAP PEDICLE  12/17/2013    Procedure: EXCISE LESION FACE WITH FLAP PEDICLE;  EXCISION LEFT FACIAL SKIN CARCINOMA WITH FROZEN SECTION AND FLAP REPAIR AND GRAFT;  Surgeon: Fang Partida MD;  Location: [facility] OR     explor lap with sigmoid resection and colostomy  2011     open cholecystectomy       OPEN REDUCTION INTERNAL FIXATION HIP NAILING Left 10/13/2016    Procedure: OPEN REDUCTION INTERNAL FIXATION HIP NAILING;  Surgeon: Leandro Parisi MD;  Location: [facility] OR     partial hysterectomy and 1 ovary       right breast beign lesion       Skin Cancer Removal         Family History   Problem Relation Age of Onset     Diabetes Mother 87        cause of death     Cancer Father 80        lung, worked in mines, cause of death     C.A.D. Other         cabg     Cancer Sister         bladder       Social History     Tobacco Use     Smoking status: Former Smoker     Smokeless tobacco: Never Used     Tobacco comment: tried to quit yes, no passive exposure   Substance Use Topics     Alcohol use: No       Current Outpatient Medications   Medication Sig Dispense Refill     Acetaminophen (TYLENOL PO) Take 325 mg by mouth every 4 hours as needed for mild pain or fever       ASPIRIN PO Take 81 mg by mouth daily       calcium polycarbophil (FIBERCON) 625 MG tablet Take 1 tablet by mouth daily       diphenhydrAMINE (BENADRYL) 25 MG capsule Take 25 mg by mouth every 6 hours as needed for itching or allergies       guaiFENesin (ROBITUSSIN) 100 MG/5ML liquid Take 200 mg by mouth every 4 hours as needed for cough       Hydrocortisone Acetate 1 % OINT Externally apply topically 2 times daily        levothyroxine (SYNTHROID, LEVOTHROID) 75 MCG tablet TAKE 1 TABLET BY MOUTH DAILY 30 tablet 9     Metoprolol Tartrate (LOPRESSOR PO) Take 25 mg by mouth 2 times daily        polyethylene glycol (MIRALAX/GLYCOLAX) powder TAKE BY MOUTH 17G IN 8OZ WATER/JUICE DAILY 255 g 11     polyethylene glycol 0.4%- propylene glycol 0.3% (SYSTANE ULTRA) 0.4-0.3 % SOLN ophthalmic solution Place 1 drop into both eyes 4 times daily as needed for dry eyes       SENEXON-S 8.6-50 MG per tablet TAKE 1 TABLET BY MOUTH TWICE A DAY FOR CONSTIPATION 100 tablet 11     traMADol (ULTRAM) 50 MG tablet Take 1/2 to 1 tablet by mouth every 6 hours as needed 30 tablet 0     UNABLE TO FIND MEDICATION NAME: LACRILUBE OPHTHALMIC 1/2 INCH THREAD DAILY       UNABLE TO FIND Colostomy wafer change 2 times weekly on Tuesday and Friday       ZETIA 10 MG tablet Take 1 tablet (10 mg) by mouth daily 30 tablet 8       Allergies   Allergen Reactions     Atorvastatin Calcium Other (See Comments)     Increased liver function test  Lipitor         REVIEW OF SYSTEMS  Skin: peristomal skin irritation  Eyes: glasses  Ears/Nose/Throat: hearing loss  Respiratory: No shortness of breath, dyspnea on exertion, cough, or hemoptysis  Cardiovascular: negative  Gastrointestinal: negative  Genitourinary: incontinence  Musculoskeletal: negative  Neurologic: memory problems  Psychiatric: negative  Hematologic/Lymphatic/Immunologic: negative  Endocrine: thyroid disorder    OBJECTIVE:    B/P: 125/66, T: 96.8, P: 59, R: 16, W: 0 lbs 0 oz, BMI: There is no height or weight on file to calculate BMI.  Constitutional: healthy, alert, no distress and elderly female.  Head: Normocephalic. No masses, lesions, tenderness or abnormalities  Cardiovascular: RRR. No murmurs, clicks gallops or rub  Respiratory:  Good diaphragmatic excursion. Lungs clear  Gastrointestinal: Abdomen soft, non-tender. BS normal. No masses, organomegaly.  Colostomy present on LLQ abd.    : Deferred  Musculoskeletal:  extremities normal- no gross deformities noted and normal muscle tone  Skin: Dry, warm skin.  No visible lesions.  Ostomy documentation:  Type of ostomy- colostomy  Location- [facility] abd  Drainage- none noted, staff report normal stools  Stoma presentation- Stoma is oval shaped and flush to the skin.  Stoma is pink and moist.  Os is at approx 8:00.  Stoma size (measurements for pouching)- 66 mm X 30 mm  Peristomal skin condition- mostly intact.  She does have some areas that bleed easily right next to the stoma  Pouching system on arrival- Convatec two piece pouch and wafer #'s [id number] & [id number]  Discharge pouching system- Sisters one piece cut to fit oval pouch #[id number], with adapt slim ring #[id number]    Neurologic: Sensation grossly WNL.  Psychiatric: affect normal/bright and poor memory, cooperative    THERAPY GOAL: Pouch will stay on for 3-4 days on a regular basis.    ASSESSMENT / PLAN:  (Z43.3) Colostomy care (H)  Comment: Patient would benefit from a flexible pouch.  Plan: New pouches ordered, and we will try a barrier ring as well.  Follow-up as needed for acute concerns    Summer Pena  CNP, CWOCN  Wound Care

## 2019-04-24 ENCOUNTER — TRANSFERRED RECORDS (OUTPATIENT)
Dept: HEALTH INFORMATION MANAGEMENT | Facility: CLINIC | Age: 84
End: 2019-04-24

## 2019-04-30 ENCOUNTER — ALLIED HEALTH/NURSE VISIT (OUTPATIENT)
Dept: FAMILY MEDICINE | Facility: OTHER | Age: 84
End: 2019-04-30
Payer: COMMERCIAL

## 2019-04-30 DIAGNOSIS — F03.90 DEMENTIA WITHOUT BEHAVIORAL DISTURBANCE, UNSPECIFIED DEMENTIA TYPE: Primary | ICD-10-CM

## 2019-04-30 DIAGNOSIS — I50.9 CONGESTIVE HEART FAILURE, UNSPECIFIED HF CHRONICITY, UNSPECIFIED HEART FAILURE TYPE (H): ICD-10-CM

## 2019-04-30 DIAGNOSIS — I10 BENIGN ESSENTIAL HYPERTENSION: ICD-10-CM

## 2019-04-30 PROCEDURE — 99308 SBSQ NF CARE LOW MDM 20: CPT | Performed by: NURSE PRACTITIONER

## 2019-05-02 VITALS
SYSTOLIC BLOOD PRESSURE: 138 MMHG | DIASTOLIC BLOOD PRESSURE: 69 MMHG | WEIGHT: 129.5 LBS | TEMPERATURE: 97.5 F | OXYGEN SATURATION: 91 % | HEART RATE: 63 BPM | BODY MASS INDEX: 26.16 KG/M2 | RESPIRATION RATE: 20 BRPM

## 2019-05-02 RX ORDER — IPRATROPIUM BROMIDE 21 UG/1
2 SPRAY, METERED NASAL 2 TIMES DAILY
Refills: 99 | COMMUNITY
Start: 2019-04-08 | End: 2020-01-01

## 2019-05-09 NOTE — PROGRESS NOTES
HISTORY OF PRESENT ILLNESS:  Estela is a 89 year old female (10/17/1925)  resident of [facility] who is being seen today for a routine 30 day follow up. Patient offers no other complaint.  Staff notes no other issues.    Current medications, allergies, and interdisciplinary care plan are reviewed.      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 (H) 01/01/2011     Priority: Medium     Kyphosis 01/01/2011     Priority: Medium     Primary osteoarthritis involving multiple joints 01/01/2011     Priority: Medium     Advanced care planning/counseling discussion 07/07/2010     Priority: Medium     Benign essential hypertension 08/14/2006     Priority: Medium     Hypothyroidism 12/11/2000     Priority: Medium          Social History     Socioeconomic History     Marital status:      Spouse name: Not on file     Number of children: Not on file     Years of education: Not on file     Highest education level: Not on file   Occupational History     Employer: HOMEMAKER     Comment: retired   Social Needs     Financial resource strain: Not on file     Food insecurity:     Worry: Not on file     Inability: Not on file     Transportation needs:     Medical: Not on file     Non-medical: Not on file   Tobacco Use     Smoking status: Former Smoker     Smokeless tobacco: Never Used     Tobacco comment: tried to quit yes, no passive exposure   Substance and Sexual Activity     Alcohol use: No     Drug use: No      Sexual activity: Not on file   Lifestyle     Physical activity:     Days per week: Not on file     Minutes per session: Not on file     Stress: Not on file   Relationships     Social connections:     Talks on phone: Not on file     Gets together: Not on file     Attends Uatsdin service: Not on file     Active member of club or organization: Not on file     Attends meetings of clubs or organizations: Not on file     Relationship status: Not on file     Intimate partner violence:     Fear of current or ex partner: Not on file     Emotionally abused: Not on file     Physically abused: Not on file     Forced sexual activity: Not on file   Other Topics Concern      Service Not Asked     Blood Transfusions Not Asked     Caffeine Concern Yes     Comment: one cup daily     Occupational Exposure Not Asked     Hobby Hazards Not Asked     Sleep Concern Not Asked     Stress Concern Not Asked     Weight Concern Not Asked     Special Diet Not Asked     Back Care Not Asked     Exercise Not Asked     Bike Helmet Not Asked     Seat Belt Not Asked     Self-Exams Not Asked     Parent/sibling w/ CABG, MI or angioplasty before 65F 55M? No   Social History Narrative    Lives at [facility]        Current Outpatient Medications   Medication Sig     Acetaminophen (TYLENOL PO) Take 325 mg by mouth every 4 hours as needed for mild pain or fever     ASPIRIN PO Take 81 mg by mouth daily     calcium polycarbophil (FIBERCON) 625 MG tablet Take 1 tablet by mouth daily     diphenhydrAMINE (BENADRYL) 25 MG capsule Take 25 mg by mouth every 6 hours as needed for itching or allergies     guaiFENesin (ROBITUSSIN) 100 MG/5ML liquid Take 200 mg by mouth every 4 hours as needed for cough     Hydrocortisone Acetate 1 % OINT Externally apply topically 2 times daily     levothyroxine (SYNTHROID, LEVOTHROID) 75 MCG tablet TAKE 1 TABLET BY MOUTH DAILY     Metoprolol Tartrate (LOPRESSOR PO) Take 25 mg by mouth 2 times daily      order for  DME Equipment being ordered: colostomy supplies:  1) [employer] one piece oval pouch #[id number]- fill #20, may refill X11  2) Sandra Adapt slim barrier ring #[id number], fill #20, may refill X11     polyethylene glycol (MIRALAX/GLYCOLAX) powder TAKE BY MOUTH 17G IN 8OZ WATER/JUICE DAILY     polyethylene glycol 0.4%- propylene glycol 0.3% (SYSTANE ULTRA) 0.4-0.3 % SOLN ophthalmic solution Place 1 drop into both eyes 4 times daily as needed for dry eyes     SENEXON-S 8.6-50 MG per tablet TAKE 1 TABLET BY MOUTH TWICE A DAY FOR CONSTIPATION     traMADol (ULTRAM) 50 MG tablet Take 1/2 to 1 tablet by mouth every 6 hours as needed     UNABLE TO FIND MEDICATION NAME: LACRILUBE OPHTHALMIC 1/2 INCH THREAD DAILY     UNABLE TO FIND Colostomy wafer change 2 times weekly on Tuesday and Friday     ZETIA 10 MG tablet Take 1 tablet (10 mg) by mouth daily     ipratropium (ATROVENT) 0.03 % nasal spray 2 SPRAYS INTO EACH NOSTRIL TWICE A DAY     No current facility-administered medications for this visit.      Facility-Administered Medications Ordered in Other Visits   Medication     lidocaine 1% with EPINEPHrine 1:100,000 injection       Allergies   Allergen Reactions     Atorvastatin Calcium Other (See Comments)     Increased liver function test  Lipitor         I have reviewed the care plan and do agree with the plan.      ROS:  No chest pain, shortness of breath, fever, chills, headache, nausea, vomiting, dysuria, or changes in bowel habits.  Appetite is stable.  no pain noted.          OBJECTIVE:  /69   Pulse 63   Temp 97.5  F (36.4  C)   Resp 20   Wt 58.7 kg (129 lb 8 oz)   SpO2 91%   BMI 26.16 kg/m      GENERAL:  Chronically ill appearing, alert, and in no acute distress  RESP:  Lungs clear.  No rales, rhonchi, or wheezing  CV:  RRR.  S1 S2 with out murmur. No clicks or rubs.  SKIN:  Age-related changes.  No suspicious lesions or rashes.  PSYCH:  Mentation mildly confused, affect pleasant  EXTREM:  trace edema.             Lab/Diagnostic data:        ASSESSMENT/PLAN:  1. Dementia without behavioral disturbance, unspecified dementia type      2. Congestive heart failure, unspecified HF chronicity, unspecified heart failure type (H)      3. Benign essential hypertension          Above issues stable.  No changes in current medications or care plan.      Total time spent with patient visit was 25 min including patient visit, review of pertinent clinical information, and treatment plan.      Taylor Barber NP

## 2019-05-29 ENCOUNTER — NURSING HOME VISIT (OUTPATIENT)
Dept: FAMILY MEDICINE | Facility: OTHER | Age: 84
End: 2019-05-29
Payer: COMMERCIAL

## 2019-05-29 DIAGNOSIS — I10 BENIGN ESSENTIAL HYPERTENSION: ICD-10-CM

## 2019-05-29 DIAGNOSIS — F03.90 DEMENTIA WITHOUT BEHAVIORAL DISTURBANCE, UNSPECIFIED DEMENTIA TYPE: Primary | ICD-10-CM

## 2019-05-29 DIAGNOSIS — E03.9 HYPOTHYROIDISM, UNSPECIFIED TYPE: ICD-10-CM

## 2019-05-29 DIAGNOSIS — I50.9 CONGESTIVE HEART FAILURE, UNSPECIFIED HF CHRONICITY, UNSPECIFIED HEART FAILURE TYPE (H): ICD-10-CM

## 2019-05-29 PROCEDURE — 99308 SBSQ NF CARE LOW MDM 20: CPT | Performed by: FAMILY MEDICINE

## 2019-06-04 VITALS
BODY MASS INDEX: 26.34 KG/M2 | SYSTOLIC BLOOD PRESSURE: 159 MMHG | RESPIRATION RATE: 22 BRPM | HEART RATE: 59 BPM | DIASTOLIC BLOOD PRESSURE: 75 MMHG | OXYGEN SATURATION: 94 % | WEIGHT: 130.4 LBS | TEMPERATURE: 96.9 F

## 2019-06-06 NOTE — PROGRESS NOTES
HISTORY OF PRESENT ILLNESS:  Estela is a 89 year old female (10/17/1925)  resident of [facility] who is being seen today for a routine 30 day follow up. Patient offers no other complaint.  Staff notes no other issues.    Current medications, allergies, and interdisciplinary care plan are reviewed.      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 (H) 01/01/2011     Priority: Medium     Kyphosis 01/01/2011     Priority: Medium     Primary osteoarthritis involving multiple joints 01/01/2011     Priority: Medium     Advanced care planning/counseling discussion 07/07/2010     Priority: Medium     Benign essential hypertension 08/14/2006     Priority: Medium     Hypothyroidism 12/11/2000     Priority: Medium          Social History     Socioeconomic History     Marital status:      Spouse name: Not on file     Number of children: Not on file     Years of education: Not on file     Highest education level: Not on file   Occupational History     Employer: [employer]     Comment: retired   Social Needs     Financial resource strain: Not on file     Food insecurity:     Worry: Not on file     Inability: Not on file     Transportation needs:     Medical: Not on file     Non-medical: Not on file   Tobacco Use     Smoking status: Former Smoker     Smokeless tobacco: Never Used     Tobacco comment: tried to quit yes, no passive exposure   Substance and Sexual Activity     Alcohol use:  No     Drug use: No     Sexual activity: Not on file   Lifestyle     Physical activity:     Days per week: Not on file     Minutes per session: Not on file     Stress: Not on file   Relationships     Social connections:     Talks on phone: Not on file     Gets together: Not on file     Attends Buddhist service: Not on file     Active member of club or organization: Not on file     Attends meetings of clubs or organizations: Not on file     Relationship status: Not on file     Intimate partner violence:     Fear of current or ex partner: Not on file     Emotionally abused: Not on file     Physically abused: Not on file     Forced sexual activity: Not on file   Other Topics Concern      Service Not Asked     Blood Transfusions Not Asked     Caffeine Concern Yes     Comment: one cup daily     Occupational Exposure Not Asked     Hobby Hazards Not Asked     Sleep Concern Not Asked     Stress Concern Not Asked     Weight Concern Not Asked     Special Diet Not Asked     Back Care Not Asked     Exercise Not Asked     Bike Helmet Not Asked     Seat Belt Not Asked     Self-Exams Not Asked     Parent/sibling w/ CABG, MI or angioplasty before 65F 55M? No   Social History Narrative    Lives at [facility]        Current Outpatient Medications   Medication Sig     Acetaminophen (TYLENOL PO) Take 325 mg by mouth every 4 hours as needed for mild pain or fever     ASPIRIN PO Take 81 mg by mouth daily     calcium polycarbophil (FIBERCON) 625 MG tablet Take 1 tablet by mouth daily     diphenhydrAMINE (BENADRYL) 25 MG capsule Take 25 mg by mouth every 6 hours as needed for itching or allergies     guaiFENesin (ROBITUSSIN) 100 MG/5ML liquid Take 200 mg by mouth every 4 hours as needed for cough     Hydrocortisone Acetate 1 % OINT Externally apply topically 2 times daily     ipratropium (ATROVENT) 0.03 % nasal spray 2 SPRAYS INTO EACH NOSTRIL TWICE A DAY     levothyroxine (SYNTHROID, LEVOTHROID) 75 MCG tablet TAKE 1 TABLET  BY MOUTH DAILY     Metoprolol Tartrate (LOPRESSOR PO) Take 25 mg by mouth 2 times daily      order for DME Equipment being ordered: colostomy supplies:  1) [place] one piece oval pouch #[id number]- fill #20, may refill X11  2) [place] [employer] slim barrier ring #[id number], fill #20, may refill X11     polyethylene glycol (MIRALAX/GLYCOLAX) powder TAKE BY MOUTH 17G IN 8OZ WATER/JUICE DAILY     polyethylene glycol 0.4%- propylene glycol 0.3% (SYSTANE ULTRA) 0.4-0.3 % SOLN ophthalmic solution Place 1 drop into both eyes 4 times daily as needed for dry eyes     SENEXON-S 8.6-50 MG per tablet TAKE 1 TABLET BY MOUTH TWICE A DAY FOR CONSTIPATION     traMADol (ULTRAM) 50 MG tablet Take 1/2 to 1 tablet by mouth every 6 hours as needed     UNABLE TO FIND MEDICATION NAME: LACRILUBE OPHTHALMIC 1/2 INCH THREAD DAILY     UNABLE TO FIND Colostomy wafer change 2 times weekly on Tuesday and Friday     ZETIA 10 MG tablet Take 1 tablet (10 mg) by mouth daily     No current facility-administered medications for this visit.      Facility-Administered Medications Ordered in Other Visits   Medication     lidocaine 1% with EPINEPHrine 1:100,000 injection       Allergies   Allergen Reactions     Atorvastatin Calcium Other (See Comments)     Increased liver function test  Lipitor         I have reviewed the care plan and do agree with the plan.      ROS:  No chest pain, shortness of breath, fever, chills, headache, nausea, vomiting, dysuria, or changes in bowel habits.  Appetite is good.  No pain noted.          OBJECTIVE:  /75   Pulse 59   Temp 96.9  F (36.1  C)   Resp 22   Wt 59.1 kg (130 lb 6.4 oz)   SpO2 94%   BMI 26.34 kg/m      GENERAL:  Chronically ill appearing, alert, and in no acute distress  RESP:  Lungs clear.  No rales, rhonchi, or wheezing  CV:  RRR.  S1 S2 without murmur. No clicks or rubs.  SKIN:  Age-related changes.  No suspicious lesions or rashes.  PSYCH:  Mentation mildly confused, affect pleasant.  EXTREM:  Mild  chronic edema.          Lab/Diagnostic data:    TSH completed in April      ASSESSMENT/PLAN:  1. Dementia without behavioral disturbance, unspecified dementia type  No changes to current care plan.    2. Benign essential hypertension  As above.    3. Congestive heart failure, unspecified HF chronicity, unspecified heart failure type (H)  As above.    4. Hypothyroidism, unspecified type  As above.        Above issues stable.  No changes in current medications or care plan.      Total time spent with patient visit was 25 min including patient visit, review of pertinent clinical information, and treatment plan.      Pao Chau MD

## 2019-06-25 ENCOUNTER — NURSING HOME VISIT (OUTPATIENT)
Dept: FAMILY MEDICINE | Facility: OTHER | Age: 84
End: 2019-06-25
Payer: COMMERCIAL

## 2019-06-25 DIAGNOSIS — I25.119 ATHEROSCLEROSIS OF NATIVE CORONARY ARTERY OF NATIVE HEART WITH ANGINA PECTORIS (H): ICD-10-CM

## 2019-06-25 DIAGNOSIS — I50.9 CONGESTIVE HEART FAILURE, UNSPECIFIED HF CHRONICITY, UNSPECIFIED HEART FAILURE TYPE (H): ICD-10-CM

## 2019-06-25 DIAGNOSIS — F03.90 DEMENTIA WITHOUT BEHAVIORAL DISTURBANCE, UNSPECIFIED DEMENTIA TYPE: Primary | ICD-10-CM

## 2019-06-25 DIAGNOSIS — E03.9 HYPOTHYROIDISM, UNSPECIFIED TYPE: ICD-10-CM

## 2019-06-25 DIAGNOSIS — E78.00 PURE HYPERCHOLESTEROLEMIA: ICD-10-CM

## 2019-06-25 DIAGNOSIS — I10 BENIGN ESSENTIAL HYPERTENSION: ICD-10-CM

## 2019-06-25 PROCEDURE — 99308 SBSQ NF CARE LOW MDM 20: CPT | Performed by: NURSE PRACTITIONER

## 2019-07-11 VITALS
RESPIRATION RATE: 20 BRPM | TEMPERATURE: 97.2 F | BODY MASS INDEX: 26.66 KG/M2 | WEIGHT: 132 LBS | HEART RATE: 64 BPM | DIASTOLIC BLOOD PRESSURE: 70 MMHG | OXYGEN SATURATION: 93 % | SYSTOLIC BLOOD PRESSURE: 112 MMHG

## 2019-07-24 ENCOUNTER — NURSING HOME VISIT (OUTPATIENT)
Dept: FAMILY MEDICINE | Facility: OTHER | Age: 84
End: 2019-07-24
Payer: COMMERCIAL

## 2019-07-24 DIAGNOSIS — I10 BENIGN ESSENTIAL HYPERTENSION: ICD-10-CM

## 2019-07-24 DIAGNOSIS — E03.9 HYPOTHYROIDISM, UNSPECIFIED TYPE: ICD-10-CM

## 2019-07-24 DIAGNOSIS — E78.00 PURE HYPERCHOLESTEROLEMIA: ICD-10-CM

## 2019-07-24 DIAGNOSIS — F03.90 DEMENTIA WITHOUT BEHAVIORAL DISTURBANCE, UNSPECIFIED DEMENTIA TYPE: Primary | ICD-10-CM

## 2019-07-24 PROCEDURE — 99308 SBSQ NF CARE LOW MDM 20: CPT | Performed by: FAMILY MEDICINE

## 2019-07-31 VITALS
DIASTOLIC BLOOD PRESSURE: 57 MMHG | SYSTOLIC BLOOD PRESSURE: 104 MMHG | WEIGHT: 131.8 LBS | BODY MASS INDEX: 26.62 KG/M2 | HEART RATE: 59 BPM | RESPIRATION RATE: 16 BRPM | TEMPERATURE: 97.5 F

## 2019-07-31 DIAGNOSIS — M25.50 MULTIPLE JOINT PAIN: ICD-10-CM

## 2019-07-31 NOTE — TELEPHONE ENCOUNTER
traMADol (ULTRAM) 50 MG tablet      Last Written Prescription Date:  7-27-18  Last Fill Quantity: 30,   # refills: 0  Last Office Visit: 7-24-19 NH visit  Future Office visit:       Routing refill request to provider for review/approval because:  Drug not on the [facility], [facility] or [facility] refill protocol or controlled substance

## 2019-08-01 RX ORDER — TRAMADOL HYDROCHLORIDE 50 MG/1
TABLET ORAL
Qty: 30 TABLET | Refills: 0 | Status: SHIPPED | OUTPATIENT
Start: 2019-08-01 | End: 2020-01-01

## 2019-08-06 NOTE — PROGRESS NOTES
HISTORY OF PRESENT ILLNESS:  Estela is a 89 year old female (10/17/1925)  resident of [facility] who is being seen today for a routine 30 day follow up. Patient offers no complaints.  Staff notes no issues.    Current medications, allergies, and interdisciplinary care plan are reviewed.      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 (H) 01/01/2011     Priority: Medium     Kyphosis 01/01/2011     Priority: Medium     Primary osteoarthritis involving multiple joints 01/01/2011     Priority: Medium     Advanced care planning/counseling discussion 07/07/2010     Priority: Medium     Benign essential hypertension 08/14/2006     Priority: Medium     Hypothyroidism 12/11/2000     Priority: Medium          Social History     Socioeconomic History     Marital status:      Spouse name: Not on file     Number of children: Not on file     Years of education: Not on file     Highest education level: Not on file   Occupational History     Employer: [employer]     Comment: retired   Social Needs     Financial resource strain: Not on file     Food insecurity:     Worry: Not on file     Inability: Not on file     Transportation needs:     Medical: Not on file     Non-medical: Not on file   Tobacco Use     Smoking status: Former Smoker     Smokeless tobacco: Never Used     Tobacco comment: tried to quit yes, no passive exposure   Substance and Sexual Activity     Alcohol use: No     Drug use: No      Sexual activity: Not on file   Lifestyle     Physical activity:     Days per week: Not on file     Minutes per session: Not on file     Stress: Not on file   Relationships     Social connections:     Talks on phone: Not on file     Gets together: Not on file     Attends Yazidi service: Not on file     Active member of club or organization: Not on file     Attends meetings of clubs or organizations: Not on file     Relationship status: Not on file     Intimate partner violence:     Fear of current or ex partner: Not on file     Emotionally abused: Not on file     Physically abused: Not on file     Forced sexual activity: Not on file   Other Topics Concern      Service Not Asked     Blood Transfusions Not Asked     Caffeine Concern Yes     Comment: one cup daily     Occupational Exposure Not Asked     Hobby Hazards Not Asked     Sleep Concern Not Asked     Stress Concern Not Asked     Weight Concern Not Asked     Special Diet Not Asked     Back Care Not Asked     Exercise Not Asked     Bike Helmet Not Asked     Seat Belt Not Asked     Self-Exams Not Asked     Parent/sibling w/ CABG, MI or angioplasty before 65F 55M? No   Social History Narrative    Lives at [facility]        Current Outpatient Medications   Medication Sig     Acetaminophen (TYLENOL PO) Take 325 mg by mouth every 4 hours as needed for mild pain or fever     ASPIRIN PO Take 81 mg by mouth daily     calcium polycarbophil (FIBERCON) 625 MG tablet Take 1 tablet by mouth daily     diphenhydrAMINE (BENADRYL) 25 MG capsule Take 25 mg by mouth every 6 hours as needed for itching or allergies     guaiFENesin (ROBITUSSIN) 100 MG/5ML liquid Take 200 mg by mouth every 4 hours as needed for cough     Hydrocortisone Acetate 1 % OINT Externally apply topically 2 times daily     ipratropium (ATROVENT) 0.03 % nasal spray 2 SPRAYS INTO EACH NOSTRIL TWICE A DAY     levothyroxine (SYNTHROID, LEVOTHROID) 75 MCG tablet TAKE 1 TABLET BY MOUTH DAILY      Metoprolol Tartrate (LOPRESSOR PO) Take 25 mg by mouth 2 times daily      order for DME Equipment being ordered: colostomy supplies:  1) [place] one piece oval pouch #[id number]- fill #20, may refill X11  2) [place] [employer] slim barrier ring #[id number], fill #20, may refill X11     polyethylene glycol (MIRALAX/GLYCOLAX) powder TAKE BY MOUTH 17G IN 8OZ WATER/JUICE DAILY     polyethylene glycol 0.4%- propylene glycol 0.3% (SYSTANE ULTRA) 0.4-0.3 % SOLN ophthalmic solution Place 1 drop into both eyes 4 times daily as needed for dry eyes     SENEXON-S 8.6-50 MG per tablet TAKE 1 TABLET BY MOUTH TWICE A DAY FOR CONSTIPATION     UNABLE TO FIND MEDICATION NAME: LACRILUBE OPHTHALMIC 1/2 INCH THREAD DAILY     UNABLE TO FIND Colostomy wafer change 2 times weekly on Tuesday and Friday     ZETIA 10 MG tablet Take 1 tablet (10 mg) by mouth daily     traMADol (ULTRAM) 50 MG tablet Take 1/2 to 1 tablet by mouth every 6 hours as needed     No current facility-administered medications for this visit.      Facility-Administered Medications Ordered in Other Visits   Medication     lidocaine 1% with EPINEPHrine 1:100,000 injection       Allergies   Allergen Reactions     Atorvastatin Calcium Other (See Comments)     Increased liver function test  Lipitor         I have reviewed the care plan and do agree with the plan.      ROS:  No chest pain, shortness of breath, fever, chills, headache, nausea, vomiting, dysuria, or changes in bowel habits.  Appetite is good.  No pain noted.          OBJECTIVE:  /57   Pulse 59   Temp 97.5  F (36.4  C)   Resp 16   Wt 59.8 kg (131 lb 12.8 oz)   BMI 26.62 kg/m      GENERAL:  Chronically ill appearing, alert, and in no acute distress  RESP:  Lungs clear.  No rales, rhonchi, or wheezing  CV:  RRR.  S1 S2 without murmur. No clicks or rubs.  SKIN:  Age-related changes.  No suspicious lesions or rashes.  PSYCH:  Mentation mildly confused, affect pleasant.  EXTREM:  Mild chronic  edema.          Lab/Diagnostic data:    TSH and other labs due in October      ASSESSMENT/PLAN:  1. Dementia without behavioral disturbance, unspecified dementia type  No changes to current care plan.    2. Pure hypercholesterolemia  Will update labs in October    3. Benign essential hypertension  As above    4. Hypothyroidism, unspecified type  As above       Above issues stable.  No changes in current medications or care plan.      Total time spent with patient visit was 25 min including patient visit, review of pertinent clinical information, and treatment plan.      Pao Chau MD

## 2019-08-20 ENCOUNTER — NURSING HOME VISIT (OUTPATIENT)
Dept: FAMILY MEDICINE | Facility: OTHER | Age: 84
End: 2019-08-20
Payer: COMMERCIAL

## 2019-08-20 DIAGNOSIS — E03.9 HYPOTHYROIDISM, UNSPECIFIED TYPE: ICD-10-CM

## 2019-08-20 DIAGNOSIS — F03.90 DEMENTIA WITHOUT BEHAVIORAL DISTURBANCE, UNSPECIFIED DEMENTIA TYPE: Primary | ICD-10-CM

## 2019-08-20 DIAGNOSIS — E78.00 PURE HYPERCHOLESTEROLEMIA: ICD-10-CM

## 2019-08-20 PROCEDURE — 99307 SBSQ NF CARE SF MDM 10: CPT | Performed by: NURSE PRACTITIONER

## 2019-08-28 VITALS
HEART RATE: 55 BPM | DIASTOLIC BLOOD PRESSURE: 55 MMHG | RESPIRATION RATE: 16 BRPM | BODY MASS INDEX: 26.7 KG/M2 | SYSTOLIC BLOOD PRESSURE: 122 MMHG | WEIGHT: 132.2 LBS | TEMPERATURE: 97.6 F | OXYGEN SATURATION: 93 %

## 2019-09-05 NOTE — PROGRESS NOTES
HISTORY OF PRESENT ILLNESS:  Estela is a 89 year old female (10/17/1925)  resident of [facility] who is being seen today for a routine 30 day follow up. Patient offers no other complaint.  Staff notes no other issues.    Current medications, allergies, and interdisciplinary care plan are reviewed.      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 (H) 01/01/2011     Priority: Medium     Kyphosis 01/01/2011     Priority: Medium     Primary osteoarthritis involving multiple joints 01/01/2011     Priority: Medium     Advanced care planning/counseling discussion 07/07/2010     Priority: Medium     Benign essential hypertension 08/14/2006     Priority: Medium     Hypothyroidism 12/11/2000     Priority: Medium          Social History     Socioeconomic History     Marital status:      Spouse name: Not on file     Number of children: Not on file     Years of education: Not on file     Highest education level: Not on file   Occupational History     Employer: [employer]ER     Comment: retired   Social Needs     Financial resource strain: Not on file     Food insecurity:     Worry: Not on file     Inability: Not on file     Transportation needs:     Medical: Not on file     Non-medical: Not on file   Tobacco Use     Smoking status: Former Smoker     Smokeless tobacco: Never Used     Tobacco comment: tried to quit yes, no passive exposure   Substance and Sexual Activity     Alcohol use: No     Drug use:  No     Sexual activity: Not on file   Lifestyle     Physical activity:     Days per week: Not on file     Minutes per session: Not on file     Stress: Not on file   Relationships     Social connections:     Talks on phone: Not on file     Gets together: Not on file     Attends Voodoo service: Not on file     Active member of club or organization: Not on file     Attends meetings of clubs or organizations: Not on file     Relationship status: Not on file     Intimate partner violence:     Fear of current or ex partner: Not on file     Emotionally abused: Not on file     Physically abused: Not on file     Forced sexual activity: Not on file   Other Topics Concern      Service Not Asked     Blood Transfusions Not Asked     Caffeine Concern Yes     Comment: one cup daily     Occupational Exposure Not Asked     Hobby Hazards Not Asked     Sleep Concern Not Asked     Stress Concern Not Asked     Weight Concern Not Asked     Special Diet Not Asked     Back Care Not Asked     Exercise Not Asked     Bike Helmet Not Asked     Seat Belt Not Asked     Self-Exams Not Asked     Parent/sibling w/ CABG, MI or angioplasty before 65F 55M? No   Social History Narrative    Lives at [facility]        Current Outpatient Medications   Medication Sig     Acetaminophen (TYLENOL PO) Take 325 mg by mouth every 4 hours as needed for mild pain or fever     ASPIRIN PO Take 81 mg by mouth daily     calcium polycarbophil (FIBERCON) 625 MG tablet Take 1 tablet by mouth daily     diphenhydrAMINE (BENADRYL) 25 MG capsule Take 25 mg by mouth every 6 hours as needed for itching or allergies     guaiFENesin (ROBITUSSIN) 100 MG/5ML liquid Take 200 mg by mouth every 4 hours as needed for cough     Hydrocortisone Acetate 1 % OINT Externally apply topically 2 times daily     ipratropium (ATROVENT) 0.03 % nasal spray 2 SPRAYS INTO EACH NOSTRIL TWICE A DAY     levothyroxine (SYNTHROID, LEVOTHROID) 75 MCG tablet TAKE 1 TABLET BY MOUTH DAILY      Metoprolol Tartrate (LOPRESSOR PO) Take 25 mg by mouth 2 times daily      order for DME Equipment being ordered: colostomy supplies:  1) [place] one piece oval pouch #[id number]- fill #20, may refill X11  2) [place] [employer] slim barrier ring #[id number], fill #20, may refill X11     polyethylene glycol (MIRALAX/GLYCOLAX) powder TAKE BY MOUTH 17G IN 8OZ WATER/JUICE DAILY     polyethylene glycol 0.4%- propylene glycol 0.3% (SYSTANE ULTRA) 0.4-0.3 % SOLN ophthalmic solution Place 1 drop into both eyes 4 times daily as needed for dry eyes     SENEXON-S 8.6-50 MG per tablet TAKE 1 TABLET BY MOUTH TWICE A DAY FOR CONSTIPATION     traMADol (ULTRAM) 50 MG tablet Take 1/2 to 1 tablet by mouth every 6 hours as needed     UNABLE TO FIND MEDICATION NAME: LACRILUBE OPHTHALMIC 1/2 INCH THREAD DAILY     UNABLE TO FIND Colostomy wafer change 2 times weekly on Tuesday and Friday     ZETIA 10 MG tablet Take 1 tablet (10 mg) by mouth daily     No current facility-administered medications for this visit.      Facility-Administered Medications Ordered in Other Visits   Medication     lidocaine 1% with EPINEPHrine 1:100,000 injection       Allergies   Allergen Reactions     Atorvastatin Calcium Other (See Comments)     Increased liver function test  Lipitor         I have reviewed the care plan and do agree with the plan.      ROS:  No chest pain, shortness of breath, fever, chills, headache, nausea, vomiting, dysuria, or changes in bowel habits.  Appetite is stable.          OBJECTIVE:  /55   Pulse 55   Temp 97.6  F (36.4  C)   Resp 16   Wt 60 kg (132 lb 3.2 oz)   SpO2 93%   BMI 26.70 kg/m      GENERAL:  Chronically ill appearing, alert, and in no acute distress  RESP:  Lungs clear.  No rales, rhonchi, or wheezing  CV:  RRR.  S1 S2 with out murmur. No clicks or rubs.  SKIN:  Age-related changes.  No suspicious lesions or rashes.  PSYCH:  Mentation confused, affect pleasant.  EXTREM:  trace edema.           Lab/Diagnostic data:           ASSESSMENT/PLAN:  1. Dementia without behavioral disturbance, unspecified dementia type      2. Pure hypercholesterolemia      3. Hypothyroidism, unspecified type          Above issues stable.  No changes in current medications or care plan.      Total time spent with patient visit was 25 min including patient visit, review of pertinent clinical information, and treatment plan.      Taylor Barber NP

## 2019-09-24 ENCOUNTER — NURSING HOME VISIT (OUTPATIENT)
Dept: FAMILY MEDICINE | Facility: OTHER | Age: 84
End: 2019-09-24
Payer: COMMERCIAL

## 2019-09-24 DIAGNOSIS — F03.90 DEMENTIA WITHOUT BEHAVIORAL DISTURBANCE, UNSPECIFIED DEMENTIA TYPE: Primary | ICD-10-CM

## 2019-09-24 DIAGNOSIS — I10 BENIGN ESSENTIAL HYPERTENSION: ICD-10-CM

## 2019-09-24 DIAGNOSIS — E03.9 HYPOTHYROIDISM, UNSPECIFIED TYPE: ICD-10-CM

## 2019-09-24 DIAGNOSIS — E78.00 PURE HYPERCHOLESTEROLEMIA: ICD-10-CM

## 2019-09-24 PROCEDURE — 99307 SBSQ NF CARE SF MDM 10: CPT | Performed by: FAMILY MEDICINE

## 2019-09-27 VITALS
WEIGHT: 132.4 LBS | DIASTOLIC BLOOD PRESSURE: 77 MMHG | TEMPERATURE: 99.7 F | BODY MASS INDEX: 26.74 KG/M2 | OXYGEN SATURATION: 92 % | HEART RATE: 81 BPM | RESPIRATION RATE: 18 BRPM | SYSTOLIC BLOOD PRESSURE: 162 MMHG

## 2019-10-01 NOTE — PROGRESS NOTES
Date of visit - 9/25/19    HISTORY OF PRESENT ILLNESS:  Estela is a 89 year old female (10/17/1925)  resident of [facility] who is being seen today for a routine 30 day follow up. Patient offers no other complaint.  Staff notes no other issues.    Current medications, allergies, and interdisciplinary care plan are reviewed.      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 pectoris (H) 01/01/2011     Priority: Medium     Kyphosis 01/01/2011     Priority: Medium     Primary osteoarthritis involving multiple joints 01/01/2011     Priority: Medium     Advanced care planning/counseling discussion 07/07/2010     Priority: Medium     Benign essential hypertension 08/14/2006     Priority: Medium     Hypothyroidism 12/11/2000     Priority: Medium          Social History     Socioeconomic History     Marital status:      Spouse name: Not on file     Number of children: Not on file     Years of education: Not on file     Highest education level: Not on file   Occupational History     Employer: [employer]     Comment: retired   Social Needs     Financial resource strain: Not on file     Food insecurity:     Worry: Not on file     Inability: Not on file     Transportation needs:     Medical: Not on file     Non-medical: Not on file   Tobacco Use     Smoking status: Former Smoker     Smokeless tobacco: Never Used     Tobacco comment: tried to quit yes, no passive exposure   Substance and Sexual Activity      Alcohol use: No     Drug use: No     Sexual activity: Not on file   Lifestyle     Physical activity:     Days per week: Not on file     Minutes per session: Not on file     Stress: Not on file   Relationships     Social connections:     Talks on phone: Not on file     Gets together: Not on file     Attends Hinduism service: Not on file     Active member of club or organization: Not on file     Attends meetings of clubs or organizations: Not on file     Relationship status: Not on file     Intimate partner violence:     Fear of current or ex partner: Not on file     Emotionally abused: Not on file     Physically abused: Not on file     Forced sexual activity: Not on file   Other Topics Concern      Service Not Asked     Blood Transfusions Not Asked     Caffeine Concern Yes     Comment: one cup daily     Occupational Exposure Not Asked     Hobby Hazards Not Asked     Sleep Concern Not Asked     Stress Concern Not Asked     Weight Concern Not Asked     Special Diet Not Asked     Back Care Not Asked     Exercise Not Asked     Bike Helmet Not Asked     Seat Belt Not Asked     Self-Exams Not Asked     Parent/sibling w/ CABG, MI or angioplasty before 65F 55M? No   Social History Narrative    Lives at [facility]        Current Outpatient Medications   Medication Sig     Acetaminophen (TYLENOL PO) Take 325 mg by mouth every 4 hours as needed for mild pain or fever     ASPIRIN PO Take 81 mg by mouth daily     calcium polycarbophil (FIBERCON) 625 MG tablet Take 1 tablet by mouth daily     diphenhydrAMINE (BENADRYL) 25 MG capsule Take 25 mg by mouth every 6 hours as needed for itching or allergies     guaiFENesin (ROBITUSSIN) 100 MG/5ML liquid Take 200 mg by mouth every 4 hours as needed for cough     Hydrocortisone Acetate 1 % OINT Externally apply topically 2 times daily     ipratropium (ATROVENT) 0.03 % nasal spray 2 SPRAYS INTO EACH NOSTRIL TWICE A DAY     levothyroxine (SYNTHROID, LEVOTHROID) 75 MCG tablet  TAKE 1 TABLET BY MOUTH DAILY     Metoprolol Tartrate (LOPRESSOR PO) Take 25 mg by mouth 2 times daily      order for DME Equipment being ordered: colostomy supplies:  1) Sandra one piece oval pouch #[id number]- fill #20, may refill X11  2) [employer] slim barrier ring #[id number], fill #20, may refill X11     polyethylene glycol (MIRALAX/GLYCOLAX) powder TAKE BY MOUTH 17G IN 8OZ WATER/JUICE DAILY     polyethylene glycol 0.4%- propylene glycol 0.3% (SYSTANE ULTRA) 0.4-0.3 % SOLN ophthalmic solution Place 1 drop into both eyes 4 times daily as needed for dry eyes     SENEXON-S 8.6-50 MG per tablet TAKE 1 TABLET BY MOUTH TWICE A DAY FOR CONSTIPATION     traMADol (ULTRAM) 50 MG tablet Take 1/2 to 1 tablet by mouth every 6 hours as needed     UNABLE TO FIND MEDICATION NAME: LACRILUBE OPHTHALMIC 1/2 INCH THREAD DAILY     UNABLE TO FIND Colostomy wafer change 2 times weekly on Tuesday and Friday     ZETIA 10 MG tablet Take 1 tablet (10 mg) by mouth daily     No current facility-administered medications for this visit.      Facility-Administered Medications Ordered in Other Visits   Medication     lidocaine 1% with EPINEPHrine 1:100,000 injection       Allergies   Allergen Reactions     Atorvastatin Calcium Other (See Comments)     Increased liver function test  Lipitor         I have reviewed the care plan and do agree with the plan.      ROS:  No chest pain, shortness of breath, fever, chills, headache, nausea, vomiting, dysuria, or changes in bowel habits.  Appetite is good.  No pain noted.          OBJECTIVE:  BP (!) 162/77   Pulse 81   Temp 99.7  F (37.6  C)   Resp 18   Wt 60.1 kg (132 lb 6.4 oz)   SpO2 92%   BMI 26.74 kg/m      GENERAL:  Chronically ill appearing, alert, and in no acute distress  RESP:  Lungs clear.  No rales, rhonchi, or wheezing  CV:  RRR.  S1 S2 without murmur. No clicks or rubs.  SKIN:  Age-related changes.  No suspicious lesions or rashes.  PSYCH:  Mentation mildly confused, affect  pleasant.  EXTREM:  Mild chronic edema.          Lab/Diagnostic data:    Will send orders for TSH, BMP, and lipids      ASSESSMENT/PLAN:  1. Dementia without behavioral disturbance, unspecified dementia type (H)  No changes to current care plans    2. Pure hypercholesterolemia  Will send orders for labs    3. Benign essential hypertension  As above    4. Hypothyroidism, unspecified type  As above        Above issues stable.  No changes in current medications or care plan.      Total time spent with patient visit was 25 min including patient visit, review of pertinent clinical information, and treatment plan.      Pao Chau MD

## 2019-10-02 ENCOUNTER — MEDICAL CORRESPONDENCE (OUTPATIENT)
Dept: HEALTH INFORMATION MANAGEMENT | Facility: CLINIC | Age: 84
End: 2019-10-02

## 2019-11-07 NOTE — PROGRESS NOTES
HISTORY OF PRESENT ILLNESS:  Estela is a 89 year old female (10/17/1925)  resident of [facility] who is being seen today for a routine 30 day follow up. Patient offers no other complaint.  Staff notes no other issues.    Overall she is stable and no new concerns today.     Current medications, allergies, and interdisciplinary care plan are reviewed.      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 pectoris (H) 01/01/2011     Priority: Medium     Kyphosis 01/01/2011     Priority: Medium     Primary osteoarthritis involving multiple joints 01/01/2011     Priority: Medium     Advanced care planning/counseling discussion 07/07/2010     Priority: Medium     Benign essential hypertension 08/14/2006     Priority: Medium     Hypothyroidism 12/11/2000     Priority: Medium          Social History     Socioeconomic History     Marital status:      Spouse name: Not on file     Number of children: Not on file     Years of education: Not on file     Highest education level: Not on file   Occupational History     Employer: [employer]ER     Comment: retired   Social Needs     Financial resource strain: Not on file     Food insecurity:     Worry: Not on file     Inability: Not on file     Transportation needs:     Medical: Not on file     Non-medical: Not on file   Tobacco Use     Smoking status: Former Smoker     Smokeless tobacco: Never Used     Tobacco comment: tried to quit yes, no passive exposure    Substance and Sexual Activity     Alcohol use: No     Drug use: No     Sexual activity: Not on file   Lifestyle     Physical activity:     Days per week: Not on file     Minutes per session: Not on file     Stress: Not on file   Relationships     Social connections:     Talks on phone: Not on file     Gets together: Not on file     Attends Jain service: Not on file     Active member of club or organization: Not on file     Attends meetings of clubs or organizations: Not on file     Relationship status: Not on file     Intimate partner violence:     Fear of current or ex partner: Not on file     Emotionally abused: Not on file     Physically abused: Not on file     Forced sexual activity: Not on file   Other Topics Concern      Service Not Asked     Blood Transfusions Not Asked     Caffeine Concern Yes     Comment: one cup daily     Occupational Exposure Not Asked     Hobby Hazards Not Asked     Sleep Concern Not Asked     Stress Concern Not Asked     Weight Concern Not Asked     Special Diet Not Asked     Back Care Not Asked     Exercise Not Asked     Bike Helmet Not Asked     Seat Belt Not Asked     Self-Exams Not Asked     Parent/sibling w/ CABG, MI or angioplasty before 65F 55M? No   Social History Narrative    Lives at [facility]        Current Outpatient Medications   Medication Sig     Acetaminophen (TYLENOL PO) Take 325 mg by mouth every 4 hours as needed for mild pain or fever     ASPIRIN PO Take 81 mg by mouth daily     calcium polycarbophil (FIBERCON) 625 MG tablet Take 1 tablet by mouth daily     diphenhydrAMINE (BENADRYL) 25 MG capsule Take 25 mg by mouth every 6 hours as needed for itching or allergies     guaiFENesin (ROBITUSSIN) 100 MG/5ML liquid Take 200 mg by mouth every 4 hours as needed for cough     Hydrocortisone Acetate 1 % OINT Externally apply topically 2 times daily     ipratropium (ATROVENT) 0.03 % nasal spray 2 SPRAYS INTO EACH NOSTRIL TWICE A DAY     levothyroxine  (SYNTHROID, LEVOTHROID) 75 MCG tablet TAKE 1 TABLET BY MOUTH DAILY     Metoprolol Tartrate (LOPRESSOR PO) Take 25 mg by mouth 2 times daily      order for DME Equipment being ordered: colostomy supplies:  1) Sandra one piece oval pouch #[id number]- fill #20, may refill X11  2) Sandra [employer] slim barrier ring #[id number], fill #20, may refill X11     polyethylene glycol (MIRALAX/GLYCOLAX) powder TAKE BY MOUTH 17G IN 8OZ WATER/JUICE DAILY     polyethylene glycol 0.4%- propylene glycol 0.3% (SYSTANE ULTRA) 0.4-0.3 % SOLN ophthalmic solution Place 1 drop into both eyes 4 times daily as needed for dry eyes     SENEXON-S 8.6-50 MG per tablet TAKE 1 TABLET BY MOUTH TWICE A DAY FOR CONSTIPATION     traMADol (ULTRAM) 50 MG tablet Take 1/2 to 1 tablet by mouth every 6 hours as needed     UNABLE TO FIND MEDICATION NAME: LACRILUBE OPHTHALMIC 1/2 INCH THREAD DAILY     UNABLE TO FIND Colostomy wafer change 2 times weekly on Tuesday and Friday     ZETIA 10 MG tablet Take 1 tablet (10 mg) by mouth daily     No current facility-administered medications for this visit.      Facility-Administered Medications Ordered in Other Visits   Medication     lidocaine 1% with EPINEPHrine 1:100,000 injection       Allergies   Allergen Reactions     Atorvastatin Calcium Other (See Comments)     Increased liver function test  Lipitor         I have reviewed the care plan and do agree with the plan.      ROS:  No chest pain, shortness of breath, fever, chills, headache, nausea, vomiting, dysuria, or changes in bowel habits.  Appetite is stable.  no pain noted.          OBJECTIVE:  BP 98/53   Pulse 60   Temp 97.8  F (36.6  C)   Resp 20   Wt 60.5 kg (133 lb 6.4 oz)   SpO2 91%   BMI 26.94 kg/m      GENERAL:  Chronically ill appearing, alert, and in no acute distress  RESP:  Lungs clear.  No rales, rhonchi, or wheezing  CV:  RRR.  S1 S2 with out murmur. No clicks or rubs.  SKIN:  Age-related changes.  No suspicious lesions or rashes.  PSYCH:  Mentation  confused, affect pleasant  EXTREM:  trace edema.           Lab/Diagnostic data:          ASSESSMENT/PLAN:  1. Dementia without behavioral disturbance, unspecified dementia type (H)      2. Pure hypercholesterolemia      3. Hypothyroidism, unspecified type      4. Congestive heart failure, unspecified HF chronicity, unspecified heart failure type (H)      5. Benign essential hypertension          Above issues stable.  No changes in current medications or care plan.      Total time spent with patient visit was 25 min including patient visit, review of pertinent clinical information, and treatment plan.      Taylor Barber NP

## 2019-11-25 NOTE — PROGRESS NOTES
HISTORY OF PRESENT ILLNESS:  Estela is a 89 year old female (10/17/1925)  resident of [facility] who is being seen today for a routine 30 day follow up.  Patient offers no other complaint.  Staff notes no other issues.    Current medications, allergies, and interdisciplinary care plan are reviewed.      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 pectoris (H) 01/01/2011     Priority: Medium     Kyphosis 01/01/2011     Priority: Medium     Primary osteoarthritis involving multiple joints 01/01/2011     Priority: Medium     Advanced care planning/counseling discussion 07/07/2010     Priority: Medium     Benign essential hypertension 08/14/2006     Priority: Medium     Hypothyroidism 12/11/2000     Priority: Medium          Social History     Socioeconomic History     Marital status:      Spouse name: Not on file     Number of children: Not on file     Years of education: Not on file     Highest education level: Not on file   Occupational History     Employer: [employer]ER     Comment: retired   Social Needs     Financial resource strain: Not on file     Food insecurity:     Worry: Not on file     Inability: Not on file     Transportation needs:     Medical: Not on file     Non-medical: Not on file   Tobacco Use     Smoking status: Former Smoker     Smokeless tobacco: Never Used     Tobacco comment: tried to quit yes, no passive exposure   Substance and Sexual Activity     Alcohol use: No      Drug use: No     Sexual activity: Not on file   Lifestyle     Physical activity:     Days per week: Not on file     Minutes per session: Not on file     Stress: Not on file   Relationships     Social connections:     Talks on phone: Not on file     Gets together: Not on file     Attends Mu-ism service: Not on file     Active member of club or organization: Not on file     Attends meetings of clubs or organizations: Not on file     Relationship status: Not on file     Intimate partner violence:     Fear of current or ex partner: Not on file     Emotionally abused: Not on file     Physically abused: Not on file     Forced sexual activity: Not on file   Other Topics Concern      Service Not Asked     Blood Transfusions Not Asked     Caffeine Concern Yes     Comment: one cup daily     Occupational Exposure Not Asked     Hobby Hazards Not Asked     Sleep Concern Not Asked     Stress Concern Not Asked     Weight Concern Not Asked     Special Diet Not Asked     Back Care Not Asked     Exercise Not Asked     Bike Helmet Not Asked     Seat Belt Not Asked     Self-Exams Not Asked     Parent/sibling w/ CABG, MI or angioplasty before 65F 55M? No   Social History Narrative    Lives at [facility]        Current Outpatient Medications   Medication Sig     Acetaminophen (TYLENOL PO) Take 325 mg by mouth every 4 hours as needed for mild pain or fever     ASPIRIN PO Take 81 mg by mouth daily     calcium polycarbophil (FIBERCON) 625 MG tablet Take 1 tablet by mouth daily     diphenhydrAMINE (BENADRYL) 25 MG capsule Take 25 mg by mouth every 6 hours as needed for itching or allergies     guaiFENesin (ROBITUSSIN) 100 MG/5ML liquid Take 200 mg by mouth every 4 hours as needed for cough     Hydrocortisone Acetate 1 % OINT Externally apply topically 2 times daily     ipratropium (ATROVENT) 0.03 % nasal spray 2 SPRAYS INTO EACH NOSTRIL TWICE A DAY     levothyroxine (SYNTHROID, LEVOTHROID) 75 MCG tablet TAKE 1 TABLET BY  MOUTH DAILY     Metoprolol Tartrate (LOPRESSOR PO) Take 25 mg by mouth 2 times daily      order for DME Equipment being ordered: colostomy supplies:  1) [place] one piece oval pouch #[id number]- fill #20, may refill X11  2) [place] [employer] slim barrier ring #[id number], fill #20, may refill X11     polyethylene glycol (MIRALAX/GLYCOLAX) powder TAKE BY MOUTH 17G IN 8OZ WATER/JUICE DAILY     polyethylene glycol 0.4%- propylene glycol 0.3% (SYSTANE ULTRA) 0.4-0.3 % SOLN ophthalmic solution Place 1 drop into both eyes 4 times daily as needed for dry eyes     SENEXON-S 8.6-50 MG per tablet TAKE 1 TABLET BY MOUTH TWICE A DAY FOR CONSTIPATION     traMADol (ULTRAM) 50 MG tablet Take 1/2 to 1 tablet by mouth every 6 hours as needed     UNABLE TO FIND MEDICATION NAME: LACRILUBE OPHTHALMIC 1/2 INCH THREAD DAILY     UNABLE TO FIND Colostomy wafer change 2 times weekly on Tuesday and Friday     ZETIA 10 MG tablet Take 1 tablet (10 mg) by mouth daily     No current facility-administered medications for this visit.      Facility-Administered Medications Ordered in Other Visits   Medication     lidocaine 1% with EPINEPHrine 1:100,000 injection       Allergies   Allergen Reactions     Atorvastatin Calcium Other (See Comments)     Increased liver function test  Lipitor         I have reviewed the care plan and do agree with the plan.      ROS:  No chest pain, shortness of breath, fever, chills, headache, nausea, vomiting, dysuria, or changes in bowel habits.  Appetite is good.  No pain noted.          OBJECTIVE:  /70   Pulse 66   Temp 97  F (36.1  C)   Resp 18   Wt 59.7 kg (131 lb 9.6 oz)   SpO2 94%   BMI 26.58 kg/m      GENERAL:  Chronically ill appearing, alert, and in no acute distress  RESP:  Lungs clear.  No rales, rhonchi, or wheezing  CV:  RRR.  S1 S2 without murmur. No clicks or rubs.  SKIN:  Age-related changes.  No suspicious lesions or rashes.  PSYCH:  Mentation mildly confused, affect pleasant.  EXTREM:  Mild  edema.          Lab/Diagnostic data:    Will send orders for TSH, BMP and lipids, looks like they weren't ordered previously      ASSESSMENT/PLAN:  1. Dementia without behavioral disturbance, unspecified dementia type (H)  No changes to current care plan    2. Pure hypercholesterolemia  Lab orders sent    3. Benign essential hypertension  As above    4. Hypothyroidism, unspecified type  As above    5. Congestive heart failure, unspecified HF chronicity, unspecified heart failure type (H)  No changes        Above issues stable.  No changes in current medications or care plan.      Total time spent with patient visit was 25 min including patient visit, review of pertinent clinical information, and treatment plan.      Pao Chau MD

## 2019-12-18 NOTE — NURSING NOTE
"Chief Complaint   Patient presents with     [facility] Regulatory       Initial /64   Pulse 64   Temp 97.2  F (36.2  C) (Tympanic)   Resp 16   Wt 61.4 kg (135 lb 6.4 oz)   SpO2 93%   BMI 27.35 kg/m   Estimated body mass index is 27.35 kg/m  as calculated from the following:    Height as of 12/13/16: 1.499 m (4' 11\").    Weight as of this encounter: 61.4 kg (135 lb 6.4 oz).  Medication Reconciliation: complete  Katy Arroyo, DIANA  "

## 2020-01-01 ENCOUNTER — VIRTUAL VISIT (OUTPATIENT)
Dept: FAMILY MEDICINE | Facility: OTHER | Age: 85
End: 2020-01-01
Attending: NURSE PRACTITIONER
Payer: COMMERCIAL

## 2020-01-01 ENCOUNTER — VIRTUAL VISIT (OUTPATIENT)
Dept: FAMILY MEDICINE | Facility: OTHER | Age: 85
End: 2020-01-01
Attending: FAMILY MEDICINE
Payer: COMMERCIAL

## 2020-01-01 ENCOUNTER — TELEPHONE (OUTPATIENT)
Dept: FAMILY MEDICINE | Facility: OTHER | Age: 85
End: 2020-01-01

## 2020-01-01 ENCOUNTER — MEDICAL CORRESPONDENCE (OUTPATIENT)
Dept: HEALTH INFORMATION MANAGEMENT | Facility: CLINIC | Age: 85
End: 2020-01-01

## 2020-01-01 ENCOUNTER — TRANSFERRED RECORDS (OUTPATIENT)
Dept: HEALTH INFORMATION MANAGEMENT | Facility: CLINIC | Age: 85
End: 2020-01-01

## 2020-01-01 ENCOUNTER — DOCUMENTATION ONLY (OUTPATIENT)
Dept: OTHER | Facility: CLINIC | Age: 85
End: 2020-01-01

## 2020-01-01 VITALS
RESPIRATION RATE: 16 BRPM | DIASTOLIC BLOOD PRESSURE: 75 MMHG | SYSTOLIC BLOOD PRESSURE: 122 MMHG | OXYGEN SATURATION: 93 % | WEIGHT: 123.4 LBS | TEMPERATURE: 97.3 F | HEART RATE: 54 BPM | BODY MASS INDEX: 27.67 KG/M2

## 2020-01-01 VITALS
RESPIRATION RATE: 16 BRPM | SYSTOLIC BLOOD PRESSURE: 90 MMHG | WEIGHT: 122.6 LBS | DIASTOLIC BLOOD PRESSURE: 55 MMHG | TEMPERATURE: 97.4 F | OXYGEN SATURATION: 95 % | HEART RATE: 66 BPM | BODY MASS INDEX: 27.49 KG/M2

## 2020-01-01 VITALS
OXYGEN SATURATION: 93 % | DIASTOLIC BLOOD PRESSURE: 66 MMHG | SYSTOLIC BLOOD PRESSURE: 116 MMHG | TEMPERATURE: 97.7 F | WEIGHT: 118.13 LBS | RESPIRATION RATE: 16 BRPM | BODY MASS INDEX: 26.48 KG/M2 | HEART RATE: 56 BPM

## 2020-01-01 VITALS
SYSTOLIC BLOOD PRESSURE: 116 MMHG | HEART RATE: 86 BPM | DIASTOLIC BLOOD PRESSURE: 58 MMHG | TEMPERATURE: 98.1 F | BODY MASS INDEX: 27.58 KG/M2 | RESPIRATION RATE: 16 BRPM | OXYGEN SATURATION: 93 % | WEIGHT: 123 LBS

## 2020-01-01 DIAGNOSIS — F03.90 DEMENTIA WITHOUT BEHAVIORAL DISTURBANCE, UNSPECIFIED DEMENTIA TYPE: Primary | ICD-10-CM

## 2020-01-01 DIAGNOSIS — I10 BENIGN ESSENTIAL HYPERTENSION: ICD-10-CM

## 2020-01-01 DIAGNOSIS — E03.9 HYPOTHYROIDISM, UNSPECIFIED TYPE: ICD-10-CM

## 2020-01-01 DIAGNOSIS — L53.9 ERYTHEMA OF TOE: ICD-10-CM

## 2020-01-01 DIAGNOSIS — I50.9 CONGESTIVE HEART FAILURE, UNSPECIFIED HF CHRONICITY, UNSPECIFIED HEART FAILURE TYPE (H): ICD-10-CM

## 2020-01-01 DIAGNOSIS — E78.00 PURE HYPERCHOLESTEROLEMIA: ICD-10-CM

## 2020-01-01 DIAGNOSIS — J31.0 CHRONIC RHINITIS: Primary | ICD-10-CM

## 2020-01-01 LAB
CREAT SERPL-MCNC: 1.33 MG/DL (ref 0.4–1)
GFR SERPL CREATININE-BSD FRML MDRD: 37 ML/MIN/1.73M2
GLUCOSE SERPL-MCNC: 230 MG/DL (ref 70–99)
HBA1C MFR BLD: 5.3 % (ref 4–5.6)
INR PPP: 1 (ref 0.9–1.1)
POTASSIUM SERPL-SCNC: 3.8 MEQ/L (ref 3.4–5.1)
TSH SERPL-ACNC: 0.2 UIU/ML (ref 0.4–3.99)

## 2020-01-01 PROCEDURE — 99214 OFFICE O/P EST MOD 30 MIN: CPT | Mod: GT | Performed by: NURSE PRACTITIONER

## 2020-01-01 PROCEDURE — 99308 SBSQ NF CARE LOW MDM 20: CPT | Mod: GT | Performed by: NURSE PRACTITIONER

## 2020-01-01 PROCEDURE — 99308 SBSQ NF CARE LOW MDM 20: CPT | Mod: GT | Performed by: FAMILY MEDICINE

## 2020-01-01 RX ORDER — METOPROLOL TARTRATE 25 MG/1
25 TABLET, FILM COATED ORAL 2 TIMES DAILY
COMMUNITY

## 2020-01-01 RX ORDER — IPRATROPIUM BROMIDE 21 UG/1
SPRAY, METERED NASAL
Qty: 30 ML | Refills: 0 | Status: SHIPPED | OUTPATIENT
Start: 2020-01-01

## 2020-01-01 ASSESSMENT — PAIN SCALES - GENERAL
PAINLEVEL: NO PAIN (0)

## 2020-01-03 NOTE — PROGRESS NOTES
HISTORY OF PRESENT ILLNESS:  Estela is a 89 year old female (10/17/1925)  resident of [facility] who is being seen today for a routine 30 day follow up. Patient offers no other complaint.  Staff notes no other issues.    Current medications, allergies, and interdisciplinary care plan are reviewed.      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 pectoris (H) 01/01/2011     Priority: Medium     Kyphosis 01/01/2011     Priority: Medium     Primary osteoarthritis involving multiple joints 01/01/2011     Priority: Medium     Advanced care planning/counseling discussion 07/07/2010     Priority: Medium     Benign essential hypertension 08/14/2006     Priority: Medium     Hypothyroidism 12/11/2000     Priority: Medium          Social History     Socioeconomic History     Marital status:      Spouse name: Not on file     Number of children: Not on file     Years of education: Not on file     Highest education level: Not on file   Occupational History     Employer: [employer]     Comment: retired   Social Needs     Financial resource strain: Not on file     Food insecurity:     Worry: Not on file     Inability: Not on file     Transportation needs:     Medical: Not on file     Non-medical: Not on file   Tobacco Use     Smoking status: Former Smoker     Smokeless tobacco: Never Used     Tobacco comment: tried to quit yes, no passive exposure   Substance and Sexual Activity     Alcohol use: No     Drug  use: No     Sexual activity: Not on file   Lifestyle     Physical activity:     Days per week: Not on file     Minutes per session: Not on file     Stress: Not on file   Relationships     Social connections:     Talks on phone: Not on file     Gets together: Not on file     Attends Latter day service: Not on file     Active member of club or organization: Not on file     Attends meetings of clubs or organizations: Not on file     Relationship status: Not on file     Intimate partner violence:     Fear of current or ex partner: Not on file     Emotionally abused: Not on file     Physically abused: Not on file     Forced sexual activity: Not on file   Other Topics Concern      Service Not Asked     Blood Transfusions Not Asked     Caffeine Concern Yes     Comment: one cup daily     Occupational Exposure Not Asked     Hobby Hazards Not Asked     Sleep Concern Not Asked     Stress Concern Not Asked     Weight Concern Not Asked     Special Diet Not Asked     Back Care Not Asked     Exercise Not Asked     Bike Helmet Not Asked     Seat Belt Not Asked     Self-Exams Not Asked     Parent/sibling w/ CABG, MI or angioplasty before 65F 55M? No   Social History Narrative    Lives at [facility]        Current Outpatient Medications   Medication Sig     Acetaminophen (TYLENOL PO) Take 325 mg by mouth every 4 hours as needed for mild pain or fever     ASPIRIN PO Take 81 mg by mouth daily     calcium polycarbophil (FIBERCON) 625 MG tablet Take 1 tablet by mouth daily     diphenhydrAMINE (BENADRYL) 25 MG capsule Take 25 mg by mouth every 6 hours as needed for itching or allergies     guaiFENesin (ROBITUSSIN) 100 MG/5ML liquid Take 200 mg by mouth every 4 hours as needed for cough     Hydrocortisone Acetate 1 % OINT Externally apply topically 2 times daily     ipratropium (ATROVENT) 0.03 % nasal spray 2 SPRAYS INTO EACH NOSTRIL TWICE A DAY     levothyroxine (SYNTHROID, LEVOTHROID) 75 MCG tablet TAKE 1 TABLET BY MOUTH  DAILY     Metoprolol Tartrate (LOPRESSOR PO) Take 25 mg by mouth 2 times daily      order for DME Equipment being ordered: colostomy supplies:  1) Sandra one piece oval pouch #[id number]- fill #20, may refill X11  2) [employer] slim barrier ring #[id number], fill #20, may refill X11     polyethylene glycol (MIRALAX/GLYCOLAX) powder TAKE BY MOUTH 17G IN 8OZ WATER/JUICE DAILY     polyethylene glycol 0.4%- propylene glycol 0.3% (SYSTANE ULTRA) 0.4-0.3 % SOLN ophthalmic solution Place 1 drop into both eyes 4 times daily as needed for dry eyes     SENEXON-S 8.6-50 MG per tablet TAKE 1 TABLET BY MOUTH TWICE A DAY FOR CONSTIPATION     traMADol (ULTRAM) 50 MG tablet Take 1/2 to 1 tablet by mouth every 6 hours as needed     UNABLE TO FIND MEDICATION NAME: LACRILUBE OPHTHALMIC 1/2 INCH THREAD DAILY     UNABLE TO FIND Colostomy wafer change 2 times weekly on Tuesday and Friday     ZETIA 10 MG tablet Take 1 tablet (10 mg) by mouth daily     No current facility-administered medications for this visit.      Facility-Administered Medications Ordered in Other Visits   Medication     lidocaine 1% with EPINEPHrine 1:100,000 injection       Allergies   Allergen Reactions     Atorvastatin Calcium Other (See Comments)     Increased liver function test  Lipitor         I have reviewed the care plan and do agree with the plan.      ROS:  No chest pain, shortness of breath, fever, chills, headache, nausea, vomiting, dysuria, or changes in bowel habits.  Appetite is stable.  no pain noted.          OBJECTIVE:  /64   Pulse 64   Temp 97.2  F (36.2  C) (Tympanic)   Resp 16   Wt 61.4 kg (135 lb 6.4 oz)   SpO2 93%   BMI 27.35 kg/m      GENERAL:  Chronically ill appearing, alert, and in no acute distress  RESP:  Lungs clear.  No rales, rhonchi, or wheezing  CV:  RRR.  S1 S2 with no murmur. No clicks or rubs.  SKIN:  Age-related changes.  No suspicious lesions or rashes.  PSYCH:  Mentation confused, affect pleasant  EXTREM:  no edema.            Lab/Diagnostic data:          ASSESSMENT/PLAN:  1. Dementia without behavioral disturbance, unspecified dementia type (H)      2. Atherosclerosis of native coronary artery of native heart with angina pectoris (H)      3. Pure hypercholesterolemia      4. Hypothyroidism, unspecified type      5. Congestive heart failure, unspecified HF chronicity, unspecified heart failure type (H)      6. Benign essential hypertension          Above issues stable.  No changes in current medications or care plan.      Total time spent with patient visit was 25 min including patient visit, review of pertinent clinical information, and treatment plan.      Taylor Barber NP

## 2020-03-19 NOTE — TELEPHONE ENCOUNTER
IPRATROPIUM NASAL SP 0.03%   Last Written Prescription Date:  4/08/2019 historical  Last Fill Quantity: na,   # refills: na  Last Office Visit: 3/18/2020 [facility]  Future Office visit:

## 2020-04-17 NOTE — NURSING NOTE
"Chief Complaint   Patient presents with     [facility] Regulatory       Initial /75   Pulse 54   Temp 97.3  F (36.3  C)   Resp 16   Wt 56 kg (123 lb 6.4 oz)   SpO2 93%   BMI 27.67 kg/m   Estimated body mass index is 27.67 kg/m  as calculated from the following:    Height as of 2/11/20: 1.422 m (4' 8\").    Weight as of this encounter: 56 kg (123 lb 6.4 oz).  Medication Reconciliation: complete  Ashley A. Lechevalier, LPN  "

## 2020-05-11 NOTE — PROGRESS NOTES
"Estela Barber is a 89 year old female who is being evaluated via a billable video visit.      The patient has been notified of following:     \"This video visit will be conducted via a call between you and your physician/provider. We have found that certain health care needs can be provided without the need for an in-person physical exam.  This service lets us provide the care you need with a video conversation.  If a prescription is necessary we can send it directly to your pharmacy.  If lab work is needed we can place an order for that and you can then stop by our lab to have the test done at a later time.    Video visits are billed at different rates depending on your insurance coverage.  Please reach out to your insurance provider with any questions.    If during the course of the call the physician/provider feels a video visit is not appropriate, you will not be charged for this service.\"    Patient has given verbal consent for Video visit? Yes  How would you like to obtain your AVS? Faxed to facility  Patient would like the video invitation sent by: Email  Will anyone else be joining your video visit? No    Video-Visit Details  Type of service:  Video Visit  Originating Location (pt. Location): Long term Care  Distant Location (provider location):  [facility]   Platform used for Video Visit: Starbak  No follow-ups on file.   Katy Arroyo LPN        HISTORY OF PRESENT ILLNESS:  Estela is a 89 year old female (10/17/1925)  resident of [facility] who is being seen today for a routine 30 day follow up.  Patient offers no other complaint.  Staff notes no other issues.    Staff report is is quite stable    Current medications, allergies, and interdisciplinary care plan are reviewed.      Patient Active Problem List    Diagnosis Date Noted     Dementia without behavioral disturbance, unspecified dementia type (H) 09/21/2017     Priority: Medium     Pure hypercholesterolemia 07/20/2017 "     Priority: Medium     Femur fracture, left (H) 10/13/2016     Priority: Medium     Facial skin lesion 10/28/2013     Priority: Medium     CHF (congestive heart failure) (H) 10/28/2013     Priority: Medium     Atherosclerosis of native coronary artery of native heart with angina pectoris (H) 01/01/2011     Priority: Medium     Kyphosis 01/01/2011     Priority: Medium     Primary osteoarthritis involving multiple joints 01/01/2011     Priority: Medium     Benign essential hypertension 08/14/2006     Priority: Medium     Hypothyroidism 12/11/2000     Priority: Medium          Social History     Socioeconomic History     Marital status:      Spouse name: Not on file     Number of children: Not on file     Years of education: Not on file     Highest education level: Not on file   Occupational History     Employer: [employer]     Comment: retired   Social Needs     Financial resource strain: Not on file     Food insecurity     Worry: Not on file     Inability: Not on file     Transportation needs     Medical: Not on file     Non-medical: Not on file   Tobacco Use     Smoking status: Former Smoker     Smokeless tobacco: Never Used     Tobacco comment: tried to quit yes, no passive exposure   Substance and Sexual Activity     Alcohol use: No     Drug use: No     Sexual activity: Not on file   Lifestyle     Physical activity     Days per week: Not on file     Minutes per session: Not on file     Stress: Not on file   Relationships     Social connections     Talks on phone: Not on file     Gets together: Not on file     Attends Anabaptist service: Not on file     Active member of club or organization: Not on file     Attends meetings of clubs or organizations: Not on file     Relationship status: Not on file     Intimate partner violence     Fear of current or ex partner: Not on file     Emotionally abused: Not on file     Physically abused: Not on file     Forced sexual activity: Not on file   Other Topics Concern       Service Not Asked     Blood Transfusions Not Asked     Caffeine Concern Yes     Comment: one cup daily     Occupational Exposure Not Asked     Hobby Hazards Not Asked     Sleep Concern Not Asked     Stress Concern Not Asked     Weight Concern Not Asked     Special Diet Not Asked     Back Care Not Asked     Exercise Not Asked     Bike Helmet Not Asked     Seat Belt Not Asked     Self-Exams Not Asked     Parent/sibling w/ CABG, MI or angioplasty before 65F 55M? No   Social History Narrative    Lives at [facility]        Current Outpatient Medications   Medication Sig     Acetaminophen (TYLENOL PO) Take 650 mg by mouth every 4 hours as needed for mild pain or fever      aspirin (ASA) 81 MG chewable tablet Take 81 mg by mouth every morning     calcium polycarbophil (FIBERCON) 625 MG tablet Take 1 tablet by mouth daily     diphenhydrAMINE (BENADRYL) 25 MG capsule Take 25 mg by mouth every 6 hours as needed for itching or allergies     ezetimibe (ZETIA) 10 MG tablet Take 10 mg by mouth daily     guaiFENesin (ROBITUSSIN) 100 MG/5ML liquid Take 100 mg by mouth every 6 hours as needed for cough      hydrocortisone acetate 1 % CREA Externally apply topically every 8 hours as needed (itching)      ipratropium (ATROVENT) 0.03 % nasal spray SPRAY 2 SPRAYS INTO BOTH NOSTRILS EVERY 12 HOURS     levothyroxine (SYNTHROID, LEVOTHROID) 75 MCG tablet TAKE 1 TABLET BY MOUTH DAILY     loperamide (IMODIUM) 2 MG capsule Take 2 mg by mouth 4 times daily as needed for diarrhea     metoprolol tartrate (LOPRESSOR) 25 MG tablet Take 25 mg by mouth 2 times daily     polyethylene glycol (MIRALAX/GLYCOLAX) powder TAKE BY MOUTH 17G IN 8OZ WATER/JUICE DAILY     polyethylene glycol 0.4%- propylene glycol 0.3% (SYSTANE ULTRA) 0.4-0.3 % SOLN ophthalmic solution Place 1 drop into both eyes 4 times daily as needed for dry eyes     SENEXON-S 8.6-50 MG per tablet TAKE 1 TABLET BY MOUTH TWICE A DAY FOR CONSTIPATION     No current  facility-administered medications for this visit.      Facility-Administered Medications Ordered in Other Visits   Medication     lidocaine 1% with EPINEPHrine 1:100,000 injection       Allergies   Allergen Reactions     Atorvastatin Calcium Other (See Comments)     Increased liver function test  Lipitor         I have reviewed the care plan and do agree with the plan.      ROS:  No chest pain, shortness of breath, fever, chills, headache, nausea, vomiting, dysuria, or changes in bowel habits.  Appetite is stable.  no pain noted.          OBJECTIVE:  /75   Pulse 54   Temp 97.3  F (36.3  C)   Resp 16   Wt 56 kg (123 lb 6.4 oz)   SpO2 93%   BMI 27.67 kg/m      GENERAL:  Chronically ill appearing, alert, and in no acute distress  RESP:  Lungs clear.  No rales, rhonchi, or wheezing  CV:  RRR.  S1 S2 with out murmur. No clicks or rubs.  SKIN:  Age-related changes.  No suspicious lesions or rashes.  PSYCH:  Mentation confused, affect pleasant.  EXTREM:  trace edema.            Lab/Diagnostic data:        ASSESSMENT/PLAN:  1. Dementia without behavioral disturbance, unspecified dementia type (H)    2. Hypothyroidism, unspecified type    3. Benign essential hypertension    4. Congestive heart failure, unspecified HF chronicity, unspecified heart failure type (H)        Above issues stable.  No changes in current medications or care plan.      Total time spent with patient visit was 25 min including patient visit, review of pertinent clinical information, and treatment plan.      Taylor Barber NP

## 2020-05-22 NOTE — PROGRESS NOTES
"Estela Barber is a 89 year old female who is being evaluated via a billable video visit.      The patient has been notified of following:     \"This video visit will be conducted via a call between you and your physician/provider. We have found that certain health care needs can be provided without the need for an in-person physical exam.  This service lets us provide the care you need with a video conversation.  If a prescription is necessary we can send it directly to your pharmacy.  If lab work is needed we can place an order for that and you can then stop by our lab to have the test done at a later time.    Video visits are billed at different rates depending on your insurance coverage.  Please reach out to your insurance provider with any questions.    If during the course of the call the physician/provider feels a video visit is not appropriate, you will not be charged for this service.\"    Patient has given verbal consent for Video visit? Yes    How would you like to obtain your AVS? declined    Patient would like the video invitation sent by: Other e-mail: charge nurse email    Will anyone else be joining your video visit? Yes: charge nurse. How would they like to receive their invitation? Other e-mail: change nurse email        SUBJECTIVE:    Video Start Time: 1145      HISTORY OF PRESENT ILLNESS:  Estela is a 89 year old female (10/17/1925) resident of [facility] who is being seen today for a routine 30 day follow up. She is overall doing well. Patient offers no other complaint.  Staff notes no other issues.    Current medications, allergies, and interdisciplinary care plan are reviewed.      Patient Active Problem List    Diagnosis Date Noted     Dementia without behavioral disturbance, unspecified dementia type (H) 09/21/2017     Priority: Medium     Pure hypercholesterolemia 07/20/2017     Priority: Medium     Femur fracture, left (H) 10/13/2016     Priority: Medium     " Facial skin lesion 10/28/2013     Priority: Medium     CHF (congestive heart failure) (H) 10/28/2013     Priority: Medium     Atherosclerosis of native coronary artery of native heart with angina pectoris (H) 01/01/2011     Priority: Medium     Kyphosis 01/01/2011     Priority: Medium     Primary osteoarthritis involving multiple joints 01/01/2011     Priority: Medium     Benign essential hypertension 08/14/2006     Priority: Medium     Hypothyroidism 12/11/2000     Priority: Medium          Social History     Socioeconomic History     Marital status:      Spouse name: Not on file     Number of children: Not on file     Years of education: Not on file     Highest education level: Not on file   Occupational History     Employer: [employer]ER     Comment: retired   Social Needs     Financial resource strain: Not on file     Food insecurity     Worry: Not on file     Inability: Not on file     Transportation needs     Medical: Not on file     Non-medical: Not on file   Tobacco Use     Smoking status: Former Smoker     Smokeless tobacco: Never Used     Tobacco comment: tried to quit yes, no passive exposure   Substance and Sexual Activity     Alcohol use: No     Drug use: No     Sexual activity: Not on file   Lifestyle     Physical activity     Days per week: Not on file     Minutes per session: Not on file     Stress: Not on file   Relationships     Social connections     Talks on phone: Not on file     Gets together: Not on file     Attends Uatsdin service: Not on file     Active member of club or organization: Not on file     Attends meetings of clubs or organizations: Not on file     Relationship status: Not on file     Intimate partner violence     Fear of current or ex partner: Not on file     Emotionally abused: Not on file     Physically abused: Not on file     Forced sexual activity: Not on file   Other Topics Concern      Service Not Asked     Blood Transfusions Not Asked     Caffeine Concern Yes      Comment: one cup daily     Occupational Exposure Not Asked     Hobby Hazards Not Asked     Sleep Concern Not Asked     Stress Concern Not Asked     Weight Concern Not Asked     Special Diet Not Asked     Back Care Not Asked     Exercise Not Asked     Bike Helmet Not Asked     Seat Belt Not Asked     Self-Exams Not Asked     Parent/sibling w/ CABG, MI or angioplasty before 65F 55M? No   Social History Narrative    [facility] at [facility]        Current Outpatient Medications   Medication Sig     Acetaminophen (TYLENOL PO) Take 650 mg by mouth every 4 hours as needed for mild pain or fever      aspirin (ASA) 81 MG chewable tablet Take 81 mg by mouth every morning     calcium polycarbophil (FIBERCON) 625 MG tablet Take 1 tablet by mouth daily     diphenhydrAMINE (BENADRYL) 25 MG capsule Take 25 mg by mouth every 6 hours as needed for itching or allergies     ezetimibe (ZETIA) 10 MG tablet Take 10 mg by mouth daily     guaiFENesin (ROBITUSSIN) 100 MG/5ML liquid Take 100 mg by mouth every 6 hours as needed for cough      hydrocortisone acetate 1 % CREA Externally apply topically every 8 hours as needed (itching)      ipratropium (ATROVENT) 0.03 % nasal spray SPRAY 2 SPRAYS INTO BOTH NOSTRILS EVERY 12 HOURS     levothyroxine (SYNTHROID, LEVOTHROID) 75 MCG tablet TAKE 1 TABLET BY MOUTH DAILY     loperamide (IMODIUM) 2 MG capsule Take 2 mg by mouth 4 times daily as needed for diarrhea     metoprolol tartrate (LOPRESSOR) 25 MG tablet Take 25 mg by mouth 2 times daily     polyethylene glycol (MIRALAX/GLYCOLAX) powder TAKE BY MOUTH 17G IN 8OZ WATER/JUICE DAILY     polyethylene glycol 0.4%- propylene glycol 0.3% (SYSTANE ULTRA) 0.4-0.3 % SOLN ophthalmic solution Place 1 drop into both eyes 4 times daily as needed for dry eyes     SENEXON-S 8.6-50 MG per tablet TAKE 1 TABLET BY MOUTH TWICE A DAY FOR CONSTIPATION     No current facility-administered medications for this visit.      Facility-Administered Medications Ordered  in Other Visits   Medication     lidocaine 1% with EPINEPHrine 1:100,000 injection       Allergies   Allergen Reactions     Atorvastatin Calcium Other (See Comments)     Increased liver function test  Lipitor         I have reviewed the care plan and do agree with the plan.      ROS:  No chest pain, shortness of breath, fever, chills, headache, nausea, vomiting, dysuria, or changes in bowel habits.  Appetite is good.  No pain noted.          OBJECTIVE:  There were no vitals taken for this visit.    GENERAL: alert and no distress  EYES: Eyes grossly normal to inspection.  No discharge or erythema, or obvious scleral/conjunctival abnormalities.  RESP: No audible wheeze, cough, or visible cyanosis.  No visible retractions or increased work of breathing.    PSYCH: affect normal/bright          Lab/Diagnostic data:    Labs due - orders sent      ASSESSMENT/PLAN:  1. Dementia without behavioral disturbance, unspecified dementia type (H)  No changes to current care plan    2. Pure hypercholesterolemia  Lab orders sent.  Due to age, could consider discontinuing Zetia    3. Benign essential hypertension  Lab orders sent    4. Hypothyroidism, unspecified type  As above       Above issues stable.  No changes in current medications or care plan.          Pao Chau MD         Video-Visit Details    Type of service:  Video Visit    Video End Time:1147    Originating Location (pt. Location): Long term Care    Distant Location (provider location):  [facility]     Platform used for Video Visit: Zoom    Follow-up monthly      Pao Chau MD

## 2020-06-19 NOTE — NURSING NOTE
"Chief Complaint   Patient presents with     [facility] Regulatory       Initial BP 90/55   Pulse 66   Temp 97.4  F (36.3  C)   Resp 16   Wt 55.6 kg (122 lb 9.6 oz)   SpO2 95%   BMI 27.49 kg/m   Estimated body mass index is 27.49 kg/m  as calculated from the following:    Height as of 2/11/20: 1.422 m (4' 8\").    Weight as of this encounter: 55.6 kg (122 lb 9.6 oz).  Medication Reconciliation: complete  Ashley A. Lechevalier, LPN  "

## 2020-06-19 NOTE — PROGRESS NOTES
Estela Barber is a 89 year old female who is being evaluated via a billable video visit.          HISTORY OF PRESENT ILLNESS:  Estela is a 89 year old female (10/17/1925)  resident of [facility] who is being seen today for a routine 30 day follow up.  Patient offers no other complaint.  Staff notes no other issues.    Current medications, allergies, and interdisciplinary care plan are reviewed.      Patient Active Problem List    Diagnosis Date Noted     Dementia without behavioral disturbance, unspecified dementia type (H) 09/21/2017     Priority: Medium     Pure hypercholesterolemia 07/20/2017     Priority: Medium     Femur fracture, left (H) 10/13/2016     Priority: Medium     Facial skin lesion 10/28/2013     Priority: Medium     CHF (congestive heart failure) (H) 10/28/2013     Priority: Medium     Atherosclerosis of native coronary artery of native heart with angina pectoris (H) 01/01/2011     Priority: Medium     Kyphosis 01/01/2011     Priority: Medium     Primary osteoarthritis involving multiple joints 01/01/2011     Priority: Medium     Benign essential hypertension 08/14/2006     Priority: Medium     Hypothyroidism 12/11/2000     Priority: Medium          Social History     Socioeconomic History     Marital status:      Spouse name: Not on file     Number of children: Not on file     Years of education: Not on file     Highest education level: Not on file   Occupational History     Employer: [employer]     Comment: retired   Social Needs     Financial resource strain: Not on file     Food insecurity     Worry: Not on file     Inability: Not on file     Transportation needs     Medical: Not on file     Non-medical: Not on file   Tobacco Use     Smoking status: Former Smoker     Smokeless tobacco: Never Used     Tobacco comment: tried to quit yes, no passive exposure   Substance and Sexual Activity     Alcohol use: No     Drug use: No     Sexual activity: Not on file   Lifestyle     Physical  activity     Days per week: Not on file     Minutes per session: Not on file     Stress: Not on file   Relationships     Social connections     Talks on phone: Not on file     Gets together: Not on file     Attends Confucianism service: Not on file     Active member of club or organization: Not on file     Attends meetings of clubs or organizations: Not on file     Relationship status: Not on file     Intimate partner violence     Fear of current or ex partner: Not on file     Emotionally abused: Not on file     Physically abused: Not on file     Forced sexual activity: Not on file   Other Topics Concern      Service Not Asked     Blood Transfusions Not Asked     Caffeine Concern Yes     Comment: one cup daily     Occupational Exposure Not Asked     Hobby Hazards Not Asked     Sleep Concern Not Asked     Stress Concern Not Asked     Weight Concern Not Asked     Special Diet Not Asked     Back Care Not Asked     Exercise Not Asked     Bike Helmet Not Asked     Seat Belt Not Asked     Self-Exams Not Asked     Parent/sibling w/ CABG, MI or angioplasty before 65F 55M? No   Social History Narrative    Lives at [facility]        Current Outpatient Medications   Medication Sig     Acetaminophen (TYLENOL PO) Take 650 mg by mouth every 4 hours as needed for mild pain or fever      aspirin (ASA) 81 MG chewable tablet Take 81 mg by mouth every morning     calcium polycarbophil (FIBERCON) 625 MG tablet Take 1 tablet by mouth daily     diphenhydrAMINE (BENADRYL) 25 MG capsule Take 25 mg by mouth every 6 hours as needed for itching or allergies     guaiFENesin (ROBITUSSIN) 100 MG/5ML liquid Take 100 mg by mouth every 6 hours as needed for cough      hydrocortisone acetate 1 % CREA Externally apply topically every 8 hours as needed (itching)      ipratropium (ATROVENT) 0.03 % nasal spray SPRAY 2 SPRAYS INTO BOTH NOSTRILS EVERY 12 HOURS     levothyroxine (SYNTHROID, LEVOTHROID) 75 MCG tablet TAKE 1 TABLET BY MOUTH DAILY      loperamide (IMODIUM) 2 MG capsule Take 2 mg by mouth 4 times daily as needed for diarrhea     metoprolol tartrate (LOPRESSOR) 25 MG tablet Take 25 mg by mouth 2 times daily     polyethylene glycol (MIRALAX/GLYCOLAX) powder TAKE BY MOUTH 17G IN 8OZ WATER/JUICE DAILY     polyethylene glycol 0.4%- propylene glycol 0.3% (SYSTANE ULTRA) 0.4-0.3 % SOLN ophthalmic solution Place 1 drop into both eyes 4 times daily as needed for dry eyes     SENEXON-S 8.6-50 MG per tablet TAKE 1 TABLET BY MOUTH TWICE A DAY FOR CONSTIPATION     No current facility-administered medications for this visit.      Facility-Administered Medications Ordered in Other Visits   Medication     lidocaine 1% with EPINEPHrine 1:100,000 injection       Allergies   Allergen Reactions     Atorvastatin Calcium Other (See Comments)     Increased liver function test  Lipitor         I have reviewed the care plan and do agree with the plan.      ROS:  No chest pain, shortness of breath, fever, chills, headache, nausea, vomiting, dysuria, or changes in bowel habits.  Appetite is stable.  no pain noted.          OBJECTIVE:  BP 90/55   Pulse 66   Temp 97.4  F (36.3  C)   Resp 16   Wt 55.6 kg (122 lb 9.6 oz)   SpO2 95%   BMI 27.49 kg/m      GENERAL: alert and no distress  EYES: Eyes grossly normal to inspection.  No discharge or erythema, or obvious scleral/conjunctival abnormalities.  RESP: No audible wheeze, cough, or visible cyanosis.  No visible retractions or increased work of breathing.    PSYCH: affect normal/bright          Lab/Diagnostic data:          ASSESSMENT/PLAN:  1. Dementia without behavioral disturbance, unspecified dementia type (H)      2. Hypothyroidism, unspecified type      3. Pure hypercholesterolemia      4. Benign essential hypertension      5. Congestive heart failure, unspecified HF chronicity, unspecified heart failure type (H)          Above issues stable.  No changes in current medications or care plan.      Total time spent  with patient visit was 15 min including patient visit, review of pertinent clinical information, and treatment plan.      Taylor Barber NP       Video-Visit Details    Type of service:  Video Visit    Video End Time:    Originating Location (pt. Location): Long term Care    Distant Location (provider location):  [facility]     Platform used for Video Visit: [employer]    No follow-ups on file.       Amy Fontana LPN

## 2020-06-22 NOTE — NURSING NOTE
"Chief Complaint   Patient presents with     [facility] Regulatory       Initial BP 90/55   Pulse 66   Temp 97.4  F (36.3  C)   Resp 16   Wt 55.6 kg (122 lb 9.6 oz)   SpO2 95%   BMI 27.49 kg/m   Estimated body mass index is 27.49 kg/m  as calculated from the following:    Height as of 2/11/20: 1.422 m (4' 8\").    Weight as of this encounter: 55.6 kg (122 lb 9.6 oz).  Medication Reconciliation: complete     Amy Fontana LPN    "

## 2020-07-21 NOTE — NURSING NOTE
"Chief Complaint   Patient presents with     [facility] Regulatory       Initial /58   Pulse 86   Temp 98.1  F (36.7  C)   Resp 16   Wt 55.8 kg (123 lb)   SpO2 93%   BMI 27.58 kg/m   Estimated body mass index is 27.58 kg/m  as calculated from the following:    Height as of 2/11/20: 1.422 m (4' 8\").    Weight as of this encounter: 55.8 kg (123 lb).  Medication Reconciliation: complete  Amy Fontana LPN  "

## 2020-07-21 NOTE — PROGRESS NOTES
Video-Visit Details    Type of service:  Video Visit    Video Start Time:1146    Video End Time:1151    Originating Location (pt. Location): Long term Care    Distant Location (provider location):  [facility]     Platform used for Video Visit: [employer]          HISTORY OF PRESENT ILLNESS:  Estela is a 89 year old female (10/17/1925)  resident of [facility] who is being seen today for a routine 30 day follow up. Over the weekend, staff noted patient was complaining of toe pain and would not bear weight on right foot.  Foot was reevaluated early this week and right great toenail was a little red, but patient is now bearing weight.  Nurse also noted that 2 toes of left foot also have mild redness.  Daughter was able to join call today, and states that when patient wears her crocs, she tends to curl her toes and will complain of pain.  Her symptoms have improved quite a bit. Patient offers no other complaint.  Staff notes no other issues.    Current medications, allergies, and interdisciplinary care plan are reviewed.      Patient Active Problem List    Diagnosis Date Noted     Dementia without behavioral disturbance, unspecified dementia type (H) 09/21/2017     Priority: Medium     Pure hypercholesterolemia 07/20/2017     Priority: Medium     Femur fracture, left (H) 10/13/2016     Priority: Medium     Facial skin lesion 10/28/2013     Priority: Medium     CHF (congestive heart failure) (H) 10/28/2013     Priority: Medium     Atherosclerosis of native coronary artery of native heart with angina pectoris (H) 01/01/2011     Priority: Medium     Kyphosis 01/01/2011     Priority: Medium     Primary osteoarthritis involving multiple joints 01/01/2011     Priority: Medium     Benign essential hypertension 08/14/2006     Priority: Medium     Hypothyroidism 12/11/2000     Priority: Medium          Social History     Socioeconomic History     Marital status:      Spouse  name: Not on file     Number of children: Not on file     Years of education: Not on file     Highest education level: Not on file   Occupational History     Employer: [employer]     Comment: retired   Social Needs     Financial resource strain: Not on file     Food insecurity     Worry: Not on file     Inability: Not on file     Transportation needs     Medical: Not on file     Non-medical: Not on file   Tobacco Use     Smoking status: Former Smoker     Smokeless tobacco: Never Used     Tobacco comment: tried to quit yes, no passive exposure   Substance and Sexual Activity     Alcohol use: No     Drug use: No     Sexual activity: Not on file   Lifestyle     Physical activity     Days per week: Not on file     Minutes per session: Not on file     Stress: Not on file   Relationships     Social connections     Talks on phone: Not on file     Gets together: Not on file     Attends Orthodox service: Not on file     Active member of club or organization: Not on file     Attends meetings of clubs or organizations: Not on file     Relationship status: Not on file     Intimate partner violence     Fear of current or ex partner: Not on file     Emotionally abused: Not on file     Physically abused: Not on file     Forced sexual activity: Not on file   Other Topics Concern      Service Not Asked     Blood Transfusions Not Asked     Caffeine Concern Yes     Comment: one cup daily     Occupational Exposure Not Asked     Hobby Hazards Not Asked     Sleep Concern Not Asked     Stress Concern Not Asked     Weight Concern Not Asked     Special Diet Not Asked     Back Care Not Asked     Exercise Not Asked     Bike Helmet Not Asked     Seat Belt Not Asked     Self-Exams Not Asked     Parent/sibling w/ CABG, MI or angioplasty before 65F 55M? No   Social History Narrative    [facility] at [facility]        Current Outpatient Medications   Medication Sig     Acetaminophen (TYLENOL PO) Take 650 mg by mouth every 4 hours as  needed for mild pain or fever      aspirin (ASA) 81 MG chewable tablet Take 81 mg by mouth every morning     calcium polycarbophil (FIBERCON) 625 MG tablet Take 1 tablet by mouth daily     diphenhydrAMINE (BENADRYL) 25 MG capsule Take 25 mg by mouth every 6 hours as needed for itching or allergies     guaiFENesin (ROBITUSSIN) 100 MG/5ML liquid Take 100 mg by mouth every 6 hours as needed for cough      hydrocortisone acetate 1 % CREA Externally apply topically every 8 hours as needed (itching)      ipratropium (ATROVENT) 0.03 % nasal spray SPRAY 2 SPRAYS INTO BOTH NOSTRILS EVERY 12 HOURS     levothyroxine (SYNTHROID, LEVOTHROID) 75 MCG tablet TAKE 1 TABLET BY MOUTH DAILY     loperamide (IMODIUM) 2 MG capsule Take 2 mg by mouth 4 times daily as needed for diarrhea     metoprolol tartrate (LOPRESSOR) 25 MG tablet Take 25 mg by mouth 2 times daily     polyethylene glycol (MIRALAX/GLYCOLAX) powder TAKE BY MOUTH 17G IN 8OZ WATER/JUICE DAILY     polyethylene glycol 0.4%- propylene glycol 0.3% (SYSTANE ULTRA) 0.4-0.3 % SOLN ophthalmic solution Place 1 drop into both eyes 4 times daily as needed for dry eyes     SENEXON-S 8.6-50 MG per tablet TAKE 1 TABLET BY MOUTH TWICE A DAY FOR CONSTIPATION     No current facility-administered medications for this visit.      Facility-Administered Medications Ordered in Other Visits   Medication     lidocaine 1% with EPINEPHrine 1:100,000 injection       Allergies   Allergen Reactions     Atorvastatin Calcium Other (See Comments)     Increased liver function test  Lipitor         I have reviewed the care plan and do agree with the plan.      ROS:  No chest pain, shortness of breath, fever, chills, headache, nausea, vomiting, dysuria, or changes in bowel habits.  Appetite is good.  Toe pain noted.          OBJECTIVE:  /58   Pulse 86   Temp 98.1  F (36.7  C)   Resp 16   Wt 55.8 kg (123 lb)   SpO2 93%   BMI 27.58 kg/m      GENERAL: alert and no distress  RESP: No audible wheeze,  cough, or visible cyanosis.  No visible retractions or increased work of breathing.    PSYCH: affect normal/bright            Lab/Diagnostic data:    UTD      ASSESSMENT/PLAN:  1. Dementia without behavioral disturbance, unspecified dementia type (H)  No changes to current care plan    2. Erythema of toe  Staff will monitor symptoms and shoes.  Follow-up if symptoms worsen.    3. Congestive heart failure, unspecified HF chronicity, unspecified heart failure type (H)  No changes        Above issues stable.  No changes in current medications or care plan.          Pao Chau MD

## 2020-08-21 NOTE — TELEPHONE ENCOUNTER
Received fax from [employer] in regards to Estela Barber   :  10/17/1925     The following information was received via fax:   10:05    Residents eyes very red even with Systane gtts QID,  Is there a better/stronger gtt to try?

## 2020-08-25 NOTE — TELEPHONE ENCOUNTER
Patient should probably have evaluation with Optometry/Ophthalmology for evaluation and treatment recommendations

## 2020-09-22 NOTE — PROGRESS NOTES
"Estela Barber is a 89 year old female who is being evaluated via a billable video visit.      The patient has been notified of following:     \"This video visit will be conducted via a call between you and your physician/provider. We have found that certain health care needs can be provided without the need for an in-person physical exam.  This service lets us provide the care you need with a video conversation.  If a prescription is necessary we can send it directly to your pharmacy.  If lab work is needed we can place an order for that and you can then stop by our lab to have the test done at a later time.    Video visits are billed at different rates depending on your insurance coverage.  Please reach out to your insurance provider with any questions.    If during the course of the call the physician/provider feels a video visit is not appropriate, you will not be charged for this service.\"    Patient has given verbal consent for Video visit? {YES-NO  Default Yes:4444::\"Yes\"}  How would you like to obtain your AVS? {AVS Preference:487297}  If you are dropped from the video visit, the video invite should be resent to: {video visit invitation:199170}  Will anyone else be joining your video visit? {:626483}  {If patient encounters technical issues they should call [phone] :711854}    Subjective     Estela Barber is a 89 year old female who presents today via video visit for the following health issues:    HPI    {SUPERLIST (Optional):819304}  {PEDS Chronic and Acute Problems (Optional):698238}     Video Start Time: {video visit start/end time for provider to select:740584}    {additonal problems for provider to add (Optional):881840}    Review of Systems   {ROS COMP (Optional):672525}      Objective           Vitals:  No vitals were obtained today due to virtual visit.    Physical Exam     {video visit exam brief selected:009100::\"GENERAL: Healthy, alert and no distress\",\"EYES: Eyes grossly normal to " "inspection.  No discharge or erythema, or obvious scleral/conjunctival abnormalities.\",\"RESP: No audible wheeze, cough, or visible cyanosis.  No visible retractions or increased work of breathing.  \",\"SKIN: Visible skin clear. No significant rash, abnormal pigmentation or lesions.\",\"NEURO: Cranial nerves grossly intact.  Mentation and speech appropriate for age.\",\"PSYCH: Mentation appears normal, affect normal/bright, judgement and insight intact, normal speech and appearance well-groomed.\"}      {Diagnostic Test Results (Optional):414724}        {PROVIDER CHARTING PREFERENCE:450714}      Video-Visit Details    Type of service:  Video Visit    Video End Time:{video visit start/end time for provider to select:152948}    Originating Location (pt. Location): {video visit patient location:186312::\"Home\"}    Distant Location (provider location):  [facility]     Platform used for Video Visit: {Virtual Visit Platforms:572351::\"[employer]\"}          "

## 2020-09-22 NOTE — NURSING NOTE
"Chief Complaint   Patient presents with     detention Regulatory       Initial /66   Pulse 56   Temp 97.7  F (36.5  C)   Resp 16   Wt 53.6 kg (118 lb 2 oz)   SpO2 93%   BMI 26.48 kg/m   Estimated body mass index is 26.48 kg/m  as calculated from the following:    Height as of 2/11/20: 1.422 m (4' 8\").    Weight as of this encounter: 53.6 kg (118 lb 2 oz).  Medication Reconciliation: complete  Jennifer Espinoza MA  "

## 2020-09-22 NOTE — PROGRESS NOTES
"Estela Barber is a 89 year old female who is being evaluated via a billable video visit.      The patient has been notified of following:     \"This video visit will be conducted via a call between you and your physician/provider. We have found that certain health care needs can be provided without the need for an in-person physical exam.  This service lets us provide the care you need with a video conversation.  If a prescription is necessary we can send it directly to your pharmacy.  If lab work is needed we can place an order for that and you can then stop by our lab to have the test done at a later time.    Video visits are billed at different rates depending on your insurance coverage.  Please reach out to your insurance provider with any questions.    If during the course of the call the physician/provider feels a video visit is not appropriate, you will not be charged for this service.\"    Patient has given verbal consent for Video visit? Yes    How would you like to obtain your AVS? Philipp    Patient would like the video invitation sent by: Other e-mail: Ashleigh    Will anyone else be joining your video visit? NH staff        SUBJECTIVE:    Video Start Time: 1236      HISTORY OF PRESENT ILLNESS:  Estela is a 89 year old female (10/17/1925) resident of [facility] long term care unit who is being seen today for a routine 30 day follow up. She does have a persistent mild cough, no recent change. Patient offers no other complaint.  Staff notes no other issues.    Current medications, allergies, and interdisciplinary care plan are reviewed.      Patient Active Problem List    Diagnosis Date Noted     Dementia without behavioral disturbance, unspecified dementia type (H) 09/21/2017     Priority: Medium     Pure hypercholesterolemia 07/20/2017     Priority: Medium     Femur fracture, left (H) 10/13/2016     Priority: Medium     Facial skin lesion 10/28/2013     Priority: Medium     CHF (congestive heart " failure) (H) 10/28/2013     Priority: Medium     Atherosclerosis of native coronary artery of native heart with angina pectoris (H) 01/01/2011     Priority: Medium     Kyphosis 01/01/2011     Priority: Medium     Primary osteoarthritis involving multiple joints 01/01/2011     Priority: Medium     Benign essential hypertension 08/14/2006     Priority: Medium     Hypothyroidism 12/11/2000     Priority: Medium          Social History     Socioeconomic History     Marital status:      Spouse name: Not on file     Number of children: Not on file     Years of education: Not on file     Highest education level: Not on file   Occupational History     Employer: [employer]     Comment: retired   Social Needs     Financial resource strain: Not on file     Food insecurity     Worry: Not on file     Inability: Not on file     Transportation needs     Medical: Not on file     Non-medical: Not on file   Tobacco Use     Smoking status: Former Smoker     Smokeless tobacco: Never Used     Tobacco comment: tried to quit yes, no passive exposure   Substance and Sexual Activity     Alcohol use: No     Drug use: No     Sexual activity: Not on file   Lifestyle     Physical activity     Days per week: Not on file     Minutes per session: Not on file     Stress: Not on file   Relationships     Social connections     Talks on phone: Not on file     Gets together: Not on file     Attends [employer] service: Not on file     Active member of club or organization: Not on file     Attends meetings of clubs or organizations: Not on file     Relationship status: Not on file     Intimate partner violence     Fear of current or ex partner: Not on file     Emotionally abused: Not on file     Physically abused: Not on file     Forced sexual activity: Not on file   Other Topics Concern      Service Not Asked     Blood Transfusions Not Asked     Caffeine Concern Yes     Comment: one cup daily     Occupational Exposure Not Asked     Hobby  Hazards Not Asked     Sleep Concern Not Asked     Stress Concern Not Asked     Weight Concern Not Asked     Special Diet Not Asked     Back Care Not Asked     Exercise Not Asked     Bike Helmet Not Asked     Seat Belt Not Asked     Self-Exams Not Asked     Parent/sibling w/ CABG, MI or angioplasty before 65F 55M? No   Social History Narrative    Lives at [facility]        Current Outpatient Medications   Medication Sig     Acetaminophen (TYLENOL PO) Take 650 mg by mouth every 4 hours as needed for mild pain or fever      aspirin (ASA) 81 MG chewable tablet Take 81 mg by mouth every morning     calcium polycarbophil (FIBERCON) 625 MG tablet Take 1 tablet by mouth daily     diphenhydrAMINE (BENADRYL) 25 MG capsule Take 25 mg by mouth every 6 hours as needed for itching or allergies     guaiFENesin (ROBITUSSIN) 100 MG/5ML liquid Take 100 mg by mouth every 6 hours as needed for cough      hydrocortisone acetate 1 % CREA Externally apply topically every 8 hours as needed (itching)      ipratropium (ATROVENT) 0.03 % nasal spray SPRAY 2 SPRAYS INTO BOTH NOSTRILS EVERY 12 HOURS     levothyroxine (SYNTHROID, LEVOTHROID) 75 MCG tablet TAKE 1 TABLET BY MOUTH DAILY     loperamide (IMODIUM) 2 MG capsule Take 2 mg by mouth 4 times daily as needed for diarrhea     metoprolol tartrate (LOPRESSOR) 25 MG tablet Take 25 mg by mouth 2 times daily     polyethylene glycol (MIRALAX/GLYCOLAX) powder TAKE BY MOUTH 17G IN 8OZ WATER/JUICE DAILY     polyethylene glycol 0.4%- propylene glycol 0.3% (SYSTANE ULTRA) 0.4-0.3 % SOLN ophthalmic solution Place 1 drop into both eyes 4 times daily as needed for dry eyes     SENEXON-S 8.6-50 MG per tablet TAKE 1 TABLET BY MOUTH TWICE A DAY FOR CONSTIPATION     No current facility-administered medications for this visit.      Facility-Administered Medications Ordered in Other Visits   Medication     lidocaine 1% with EPINEPHrine 1:100,000 injection       Allergies   Allergen Reactions      Atorvastatin Calcium Other (See Comments)     Increased liver function test  Lipitor         I have reviewed the care plan and do agree with the plan.      ROS:  No chest pain, shortness of breath, fever, chills, headache, nausea, vomiting, dysuria, or changes in bowel habits.  Appetite is fair.  No pain noted.          OBJECTIVE:  /66   Pulse 56   Temp 97.7  F (36.5  C)   Resp 16   Wt 53.6 kg (118 lb 2 oz)   SpO2 93%   BMI 26.48 kg/m      GENERAL: alert and no distress  RESP: No audible wheeze, cough, or visible cyanosis.  No visible retractions or increased work of breathing.    PSYCH: affect normal/bright          Lab/Diagnostic data:    Labs due in November - BMP, TSH      ASSESSMENT/PLAN:  1. Dementia without behavioral disturbance, unspecified dementia type (H)  No changes to current care plan    2. Congestive heart failure, unspecified HF chronicity, unspecified heart failure type (H)  As above    3. Benign essential hypertension  Labs due in November    4. Hypothyroidism, unspecified type  As above        Above issues stable.  No changes in current medications or care plan.          Pao Chau MD         Video-Visit Details    Type of service:  Video Visit    Video End Time:1238    Originating Location (pt. Location): Long term Care    Distant Location (provider location):  [facility]     Platform used for Video Visit: Zoom    Follow-up monthly      Pao Chau MD

## 2020-10-06 NOTE — TELEPHONE ENCOUNTER
9:52 AM    Reason for Call: Phone Call    Description: shanna at [facility] called and states patients BP is high 188/120, meds were taken, please call shanna for further information [phone]        Keyur Tee